# Patient Record
Sex: MALE | Race: WHITE | Employment: OTHER | URBAN - METROPOLITAN AREA
[De-identification: names, ages, dates, MRNs, and addresses within clinical notes are randomized per-mention and may not be internally consistent; named-entity substitution may affect disease eponyms.]

---

## 2019-05-23 ENCOUNTER — HOSPITAL ENCOUNTER (EMERGENCY)
Age: 84
Discharge: HOME | End: 2019-05-23
Payer: MEDICARE

## 2019-05-23 VITALS
TEMPERATURE: 98.4 F | DIASTOLIC BLOOD PRESSURE: 65 MMHG | SYSTOLIC BLOOD PRESSURE: 106 MMHG | WEIGHT: 180 LBS | HEART RATE: 92 BPM | OXYGEN SATURATION: 96 % | RESPIRATION RATE: 18 BRPM

## 2019-05-23 DIAGNOSIS — J01.10 ACUTE NON-RECURRENT FRONTAL SINUSITIS: Primary | ICD-10-CM

## 2019-05-23 DIAGNOSIS — J20.9 ACUTE BRONCHITIS, UNSPECIFIED ORGANISM: ICD-10-CM

## 2019-05-23 PROCEDURE — 99212 OFFICE O/P EST SF 10 MIN: CPT

## 2019-05-23 RX ORDER — DOXYCYCLINE 100 MG/1
100 CAPSULE ORAL 2 TIMES DAILY
Qty: 14 CAPSULE | Refills: 0 | Status: SHIPPED | OUTPATIENT
Start: 2019-05-23 | End: 2019-05-30

## 2019-05-23 RX ORDER — LISINOPRIL 10 MG/1
10 TABLET ORAL DAILY
COMMUNITY
End: 2021-03-23 | Stop reason: SDUPTHER

## 2019-05-23 RX ORDER — FLUTICASONE PROPIONATE 110 UG/1
2 AEROSOL, METERED RESPIRATORY (INHALATION) 2 TIMES DAILY
COMMUNITY

## 2019-05-23 RX ORDER — LEVOTHYROXINE SODIUM 0.03 MG/1
25 TABLET ORAL DAILY
COMMUNITY
End: 2021-01-28 | Stop reason: SDUPTHER

## 2019-05-23 RX ORDER — GUAIFENESIN AND DEXTROMETHORPHAN HYDROBROMIDE 1200; 60 MG/1; MG/1
1 TABLET, EXTENDED RELEASE ORAL EVERY 12 HOURS PRN
Qty: 12 TABLET | Refills: 0 | Status: ON HOLD | OUTPATIENT
Start: 2019-05-23 | End: 2020-06-30

## 2019-05-23 RX ORDER — TRIAMTERENE AND HYDROCHLOROTHIAZIDE 37.5; 25 MG/1; MG/1
TABLET ORAL EVERY OTHER DAY
Status: ON HOLD | COMMUNITY
End: 2020-07-02 | Stop reason: HOSPADM

## 2019-05-23 RX ORDER — FOLIC ACID/MULTIVIT,IRON,MINER .4-18-35
1 TABLET,CHEWABLE ORAL DAILY
COMMUNITY
End: 2022-05-06

## 2019-05-23 RX ORDER — CITALOPRAM 20 MG/1
20 TABLET ORAL DAILY
COMMUNITY
End: 2021-01-28 | Stop reason: SDUPTHER

## 2019-05-23 NOTE — ED PROVIDER NOTES
This is an 77-year-old male presents to urgent care complaining of sinus problems and URI complaints and a cough this past week. He states symptoms have just not really gone away they seem to be lingering on. He denies any abdominal pain nausea vomiting diarrhea or urinary symptoms. Review of Systems   Constitutional:        Pertinent positives and negatives are stated within HPI, all other systems reviewed and are negative. Physical Exam   Constitutional: He is oriented to person, place, and time. He appears well-developed and well-nourished. HENT:   Head: Normocephalic and atraumatic. Right Ear: Hearing, external ear and ear canal normal. No mastoid tenderness. Left Ear: Hearing, external ear and ear canal normal. No mastoid tenderness. Nose: Right sinus exhibits frontal sinus tenderness. Right sinus exhibits no maxillary sinus tenderness. Left sinus exhibits frontal sinus tenderness. Left sinus exhibits no maxillary sinus tenderness. Mouth/Throat: Uvula is midline, oropharynx is clear and moist and mucous membranes are normal. No trismus in the jaw. No uvula swelling. Both TMs are mildly bulging but no perforation or redness. Eyes: Pupils are equal, round, and reactive to light. Conjunctivae, EOM and lids are normal.   Neck: Normal range of motion. Neck supple. Cardiovascular: Normal rate, regular rhythm and normal heart sounds. No murmur heard. Pulmonary/Chest: Effort normal and breath sounds normal.   Occasional moist congested cough   Abdominal: Soft. Bowel sounds are normal. There is no tenderness. There is no rigidity, no rebound, no guarding and no CVA tenderness. Musculoskeletal: He exhibits no edema. Neurological: He is alert and oriented to person, place, and time. He has normal strength. No cranial nerve deficit or sensory deficit. Coordination and gait normal. GCS eye subscore is 4. GCS verbal subscore is 5. GCS motor subscore is 6. Skin: Skin is warm and dry. No abrasion and no rash noted. Nursing note and vitals reviewed. Procedures    [facility]         --------------------------------------------- PAST HISTORY ---------------------------------------------  Past Medical History:  has a past medical history of Arthritis, Asthma, Hyperlipidemia, Hypertension, Macular degeneration, and Thyroid disease. Past Surgical History:  has a past surgical history that includes eye surgery. Social History:  reports that he has never smoked. He has never used smokeless tobacco.    Family History: family history is not on file. The patients home medications have been reviewed. Allergies: Pcn [penicillins]    -------------------------------------------------- RESULTS -------------------------------------------------  No results found for this visit on 05/23/19. No orders to display       ------------------------- NURSING NOTES AND VITALS REVIEWED ---------------------------   The nursing notes within the ED encounter and vital signs as below have been reviewed. /65   Pulse 92   Temp 98.4 °F (36.9 °C) (Oral)   Resp 18   Wt 180 lb (81.6 kg)   SpO2 96%   Oxygen Saturation Interpretation: Normal      ------------------------------------------ PROGRESS NOTES ------------------------------------------   I have spoken with the patient and discussed todays results, in addition to providing specific details for the plan of care and counseling regarding the diagnosis and prognosis. Their questions are answered at this time and they are agreeable with the plan.      --------------------------------- ADDITIONAL PROVIDER NOTES ---------------------------------     This patient is stable for discharge. I have shared the specific conditions for return, as well as the importance of follow-up. * NOTE: This report was transcribed using voice recognition software.  Every effort was made to ensure accuracy; however, inadvertent computerized transcription errors may be present.    --------------------------------- IMPRESSION AND DISPOSITION ---------------------------------    IMPRESSION  1. Acute non-recurrent frontal sinusitis    2.  Acute bronchitis, unspecified organism        DISPOSITION  Disposition: Discharge to home  Patient condition is good         Isela Olguin PA-C  05/23/19 2179

## 2020-02-26 ENCOUNTER — TELEPHONE (OUTPATIENT)
Dept: SURGERY | Age: 85
End: 2020-02-26

## 2020-02-26 ENCOUNTER — OFFICE VISIT (OUTPATIENT)
Dept: SURGERY | Age: 85
End: 2020-02-26
Payer: MEDICARE

## 2020-02-26 VITALS
SYSTOLIC BLOOD PRESSURE: 136 MMHG | TEMPERATURE: 97.6 F | OXYGEN SATURATION: 93 % | DIASTOLIC BLOOD PRESSURE: 93 MMHG | WEIGHT: 180 LBS | HEART RATE: 96 BPM

## 2020-02-26 PROBLEM — K40.90 RIGHT INGUINAL HERNIA: Status: ACTIVE | Noted: 2020-02-26

## 2020-02-26 PROCEDURE — 99204 OFFICE O/P NEW MOD 45 MIN: CPT | Performed by: SURGERY

## 2020-02-26 RX ORDER — ROSUVASTATIN CALCIUM 5 MG/1
5 TABLET, COATED ORAL DAILY
COMMUNITY
End: 2021-01-28

## 2020-02-28 NOTE — TELEPHONE ENCOUNTER
Medical clearance received from Dr. Artem Vera for scheduled right inguinal hernia repair.    Electronically signed by Marko Lutz RN on 2/28/2020 at 11:30 AM

## 2020-03-03 RX ORDER — ASCORBIC ACID 500 MG
500 TABLET ORAL DAILY
COMMUNITY

## 2020-03-04 ENCOUNTER — HOSPITAL ENCOUNTER (OUTPATIENT)
Dept: PREADMISSION TESTING | Age: 85
Discharge: HOME | End: 2020-03-04
Payer: MEDICARE

## 2020-03-04 VITALS
SYSTOLIC BLOOD PRESSURE: 153 MMHG | HEART RATE: 89 BPM | OXYGEN SATURATION: 95 % | DIASTOLIC BLOOD PRESSURE: 86 MMHG | HEIGHT: 69 IN | WEIGHT: 180.56 LBS | RESPIRATION RATE: 18 BRPM | TEMPERATURE: 98 F | BODY MASS INDEX: 26.74 KG/M2

## 2020-03-04 LAB
ANION GAP SERPL CALCULATED.3IONS-SCNC: 9 MMOL/L (ref 7–16)
BUN BLDV-MCNC: 28 MG/DL (ref 8–23)
CALCIUM SERPL-MCNC: 9.7 MG/DL (ref 8.6–10.2)
CHLORIDE BLD-SCNC: 104 MMOL/L (ref 98–107)
CO2: 30 MMOL/L (ref 22–29)
CREAT SERPL-MCNC: 1.2 MG/DL (ref 0.7–1.2)
GFR AFRICAN AMERICAN: >60
GFR NON-AFRICAN AMERICAN: 58 ML/MIN/1.73
GLUCOSE BLD-MCNC: 112 MG/DL (ref 74–99)
HCT VFR BLD CALC: 41.7 % (ref 37–54)
HEMOGLOBIN: 13.5 G/DL (ref 12.5–16.5)
MCH RBC QN AUTO: 32.7 PG (ref 26–35)
MCHC RBC AUTO-ENTMCNC: 32.4 % (ref 32–34.5)
MCV RBC AUTO: 101 FL (ref 80–99.9)
PDW BLD-RTO: 13 FL (ref 11.5–15)
PLATELET # BLD: 206 E9/L (ref 130–450)
PMV BLD AUTO: 10.7 FL (ref 7–12)
POTASSIUM REFLEX MAGNESIUM: 4.4 MMOL/L (ref 3.5–5)
RBC # BLD: 4.13 E12/L (ref 3.8–5.8)
SODIUM BLD-SCNC: 143 MMOL/L (ref 132–146)
WBC # BLD: 7.5 E9/L (ref 4.5–11.5)

## 2020-03-04 PROCEDURE — 36415 COLL VENOUS BLD VENIPUNCTURE: CPT

## 2020-03-04 PROCEDURE — 80048 BASIC METABOLIC PNL TOTAL CA: CPT

## 2020-03-04 PROCEDURE — 85027 COMPLETE CBC AUTOMATED: CPT

## 2020-03-04 NOTE — PROGRESS NOTES
[address]                                                                                                                    PRE OP INSTRUCTIONS FOR  Maria Fernanda Romero        Date: 3/4/2020    Date of surgery: 3/9/2020   Arrival Time: Hospital will call you between 5pm and 7pm with your final arrival time for surgery    1. Do not eat or drink anything after midnight prior to surgery. This includes no water, chewing gum, mints or ice chips. 2. Take the following medications with a small sip of water on the morning of Surgery: levothyroxine, citalopram, use flovent     3. Diabetics may take evening dose of insulin but none after midnight. If you feel symptomatic or low blood sugar morning of surgery drink 1-2 ounces of apple juice only. 4. Aspirin, Ibuprofen, Advil, Naproxen, Vitamin E and other Anti-inflammatory products should be stopped  before surgery  as directed by your physician. Take Tylenol only unless instructed otherwise by your surgeon. 5. Check with your Doctor regarding stopping Plavix, Coumadin, Lovenox, Eliquis, Effient, or other blood thinners. 6. Do not smoke,use illicit drugs and do not drink any alcoholic beverages 24 hours prior to surgery. 7. You may brush your teeth the morning of surgery. DO NOT SWALLOW WATER    8. You MUST make arrangements for a responsible adult to take you home after your surgery. You will not be allowed to leave alone or drive yourself home. It is strongly suggested someone stay with you the first 24 hrs. Your surgery will be cancelled if you do not have a ride home. 9. PEDIATRIC PATIENTS ONLY:  A parent/legal guardian must accompany a child scheduled for surgery and plan to stay at the hospital until the child is discharged. Please do not bring other children with you.     10. Please wear simple, loose fitting clothing to the hospital.  Do not bring valuables (money, credit cards, checkbooks, etc.) Do not wear any makeup

## 2020-03-09 ENCOUNTER — HOSPITAL ENCOUNTER (OUTPATIENT)
Age: 85
Setting detail: OUTPATIENT SURGERY
Discharge: HOME | End: 2020-03-09
Attending: SURGERY | Admitting: SURGERY
Payer: MEDICARE

## 2020-03-09 ENCOUNTER — ANESTHESIA (OUTPATIENT)
Dept: OPERATING ROOM | Age: 85
End: 2020-03-09
Payer: MEDICARE

## 2020-03-09 ENCOUNTER — ANESTHESIA EVENT (OUTPATIENT)
Dept: OPERATING ROOM | Age: 85
End: 2020-03-09
Payer: MEDICARE

## 2020-03-09 VITALS
OXYGEN SATURATION: 94 % | HEART RATE: 91 BPM | BODY MASS INDEX: 26.22 KG/M2 | RESPIRATION RATE: 16 BRPM | SYSTOLIC BLOOD PRESSURE: 131 MMHG | TEMPERATURE: 98.6 F | DIASTOLIC BLOOD PRESSURE: 70 MMHG | HEIGHT: 69 IN | WEIGHT: 177 LBS

## 2020-03-09 VITALS
OXYGEN SATURATION: 100 % | SYSTOLIC BLOOD PRESSURE: 124 MMHG | DIASTOLIC BLOOD PRESSURE: 77 MMHG | RESPIRATION RATE: 1 BRPM

## 2020-03-09 PROCEDURE — 3700000001 HC ADD 15 MINUTES (ANESTHESIA): Performed by: SURGERY

## 2020-03-09 PROCEDURE — 2500000003 HC RX 250 WO HCPCS: Performed by: NURSE ANESTHETIST, CERTIFIED REGISTERED

## 2020-03-09 PROCEDURE — 2500000003 HC RX 250 WO HCPCS: Performed by: SURGERY

## 2020-03-09 PROCEDURE — 7100000010 HC PHASE II RECOVERY - FIRST 15 MIN: Performed by: SURGERY

## 2020-03-09 PROCEDURE — 6360000002 HC RX W HCPCS: Performed by: ANESTHESIOLOGY

## 2020-03-09 PROCEDURE — 7100000001 HC PACU RECOVERY - ADDTL 15 MIN: Performed by: SURGERY

## 2020-03-09 PROCEDURE — S2900 ROBOTIC SURGICAL SYSTEM: HCPCS | Performed by: SURGERY

## 2020-03-09 PROCEDURE — 2709999900 HC NON-CHARGEABLE SUPPLY: Performed by: SURGERY

## 2020-03-09 PROCEDURE — 49650 LAP ING HERNIA REPAIR INIT: CPT | Performed by: SURGERY

## 2020-03-09 PROCEDURE — 7100000011 HC PHASE II RECOVERY - ADDTL 15 MIN: Performed by: SURGERY

## 2020-03-09 PROCEDURE — 3600000009 HC SURGERY ROBOT BASE: Performed by: SURGERY

## 2020-03-09 PROCEDURE — 2580000003 HC RX 258: Performed by: SURGERY

## 2020-03-09 PROCEDURE — 3600000019 HC SURGERY ROBOT ADDTL 15MIN: Performed by: SURGERY

## 2020-03-09 PROCEDURE — 6370000000 HC RX 637 (ALT 250 FOR IP): Performed by: SURGERY

## 2020-03-09 PROCEDURE — 2780000010 HC IMPLANT OTHER: Performed by: SURGERY

## 2020-03-09 PROCEDURE — 6360000002 HC RX W HCPCS: Performed by: NURSE ANESTHETIST, CERTIFIED REGISTERED

## 2020-03-09 PROCEDURE — 3700000000 HC ANESTHESIA ATTENDED CARE: Performed by: SURGERY

## 2020-03-09 PROCEDURE — 6370000000 HC RX 637 (ALT 250 FOR IP): Performed by: ANESTHESIOLOGY

## 2020-03-09 PROCEDURE — C1781 MESH (IMPLANTABLE): HCPCS | Performed by: SURGERY

## 2020-03-09 PROCEDURE — 7100000000 HC PACU RECOVERY - FIRST 15 MIN: Performed by: SURGERY

## 2020-03-09 DEVICE — MESH HERN W16XL12CM LAP MFIL POLY TEREPHTHALATE MFIL: Type: IMPLANTABLE DEVICE | Site: INGUINAL | Status: FUNCTIONAL

## 2020-03-09 RX ORDER — ROCURONIUM BROMIDE 10 MG/ML
INJECTION, SOLUTION INTRAVENOUS PRN
Status: DISCONTINUED | OUTPATIENT
Start: 2020-03-09 | End: 2020-03-09 | Stop reason: SDUPTHER

## 2020-03-09 RX ORDER — EPHEDRINE SULFATE/0.9% NACL/PF 50 MG/5 ML
SYRINGE (ML) INTRAVENOUS PRN
Status: DISCONTINUED | OUTPATIENT
Start: 2020-03-09 | End: 2020-03-09 | Stop reason: SDUPTHER

## 2020-03-09 RX ORDER — PROPOFOL 10 MG/ML
INJECTION, EMULSION INTRAVENOUS PRN
Status: DISCONTINUED | OUTPATIENT
Start: 2020-03-09 | End: 2020-03-09 | Stop reason: SDUPTHER

## 2020-03-09 RX ORDER — FENTANYL CITRATE 50 UG/ML
INJECTION, SOLUTION INTRAMUSCULAR; INTRAVENOUS PRN
Status: DISCONTINUED | OUTPATIENT
Start: 2020-03-09 | End: 2020-03-09 | Stop reason: SDUPTHER

## 2020-03-09 RX ORDER — ONDANSETRON 2 MG/ML
INJECTION INTRAMUSCULAR; INTRAVENOUS PRN
Status: DISCONTINUED | OUTPATIENT
Start: 2020-03-09 | End: 2020-03-09 | Stop reason: SDUPTHER

## 2020-03-09 RX ORDER — BUPIVACAINE HYDROCHLORIDE AND EPINEPHRINE 2.5; 5 MG/ML; UG/ML
INJECTION, SOLUTION EPIDURAL; INFILTRATION; INTRACAUDAL; PERINEURAL PRN
Status: DISCONTINUED | OUTPATIENT
Start: 2020-03-09 | End: 2020-03-09 | Stop reason: ALTCHOICE

## 2020-03-09 RX ORDER — TAMSULOSIN HYDROCHLORIDE 0.4 MG/1
0.4 CAPSULE ORAL DAILY
Qty: 5 CAPSULE | Refills: 0 | Status: SHIPPED | OUTPATIENT
Start: 2020-03-09 | End: 2022-07-06

## 2020-03-09 RX ORDER — GLYCOPYRROLATE 1 MG/5 ML
SYRINGE (ML) INTRAVENOUS PRN
Status: DISCONTINUED | OUTPATIENT
Start: 2020-03-09 | End: 2020-03-09 | Stop reason: SDUPTHER

## 2020-03-09 RX ORDER — MEPERIDINE HYDROCHLORIDE 25 MG/ML
12.5 INJECTION INTRAMUSCULAR; INTRAVENOUS; SUBCUTANEOUS EVERY 5 MIN PRN
Status: DISCONTINUED | OUTPATIENT
Start: 2020-03-09 | End: 2020-03-09 | Stop reason: HOSPADM

## 2020-03-09 RX ORDER — SODIUM CHLORIDE 0.9 % (FLUSH) 0.9 %
10 SYRINGE (ML) INJECTION PRN
Status: DISCONTINUED | OUTPATIENT
Start: 2020-03-09 | End: 2020-03-09 | Stop reason: HOSPADM

## 2020-03-09 RX ORDER — DOCUSATE SODIUM 100 MG/1
100 CAPSULE, LIQUID FILLED ORAL DAILY PRN
Qty: 30 CAPSULE | Refills: 0 | Status: SHIPPED | OUTPATIENT
Start: 2020-03-09 | End: 2021-01-28

## 2020-03-09 RX ORDER — TAMSULOSIN HYDROCHLORIDE 0.4 MG/1
0.4 CAPSULE ORAL ONCE
Status: COMPLETED | OUTPATIENT
Start: 2020-03-09 | End: 2020-03-09

## 2020-03-09 RX ORDER — CLINDAMYCIN PHOSPHATE 900 MG/50ML
900 INJECTION INTRAVENOUS
Status: COMPLETED | OUTPATIENT
Start: 2020-03-09 | End: 2020-03-09

## 2020-03-09 RX ORDER — SODIUM CHLORIDE 9 MG/ML
INJECTION, SOLUTION INTRAVENOUS CONTINUOUS
Status: DISCONTINUED | OUTPATIENT
Start: 2020-03-09 | End: 2020-03-09 | Stop reason: HOSPADM

## 2020-03-09 RX ORDER — NEOSTIGMINE METHYLSULFATE 1 MG/ML
INJECTION, SOLUTION INTRAVENOUS PRN
Status: DISCONTINUED | OUTPATIENT
Start: 2020-03-09 | End: 2020-03-09 | Stop reason: SDUPTHER

## 2020-03-09 RX ORDER — MEPERIDINE HYDROCHLORIDE 25 MG/ML
INJECTION INTRAMUSCULAR; INTRAVENOUS; SUBCUTANEOUS
Status: DISCONTINUED
Start: 2020-03-09 | End: 2020-03-09 | Stop reason: HOSPADM

## 2020-03-09 RX ORDER — TRAMADOL HYDROCHLORIDE 50 MG/1
50 TABLET ORAL ONCE
Status: COMPLETED | OUTPATIENT
Start: 2020-03-09 | End: 2020-03-09

## 2020-03-09 RX ORDER — LIDOCAINE HYDROCHLORIDE 20 MG/ML
INJECTION, SOLUTION INTRAVENOUS PRN
Status: DISCONTINUED | OUTPATIENT
Start: 2020-03-09 | End: 2020-03-09 | Stop reason: SDUPTHER

## 2020-03-09 RX ORDER — HYDROMORPHONE HYDROCHLORIDE 1 MG/ML
0.25 INJECTION, SOLUTION INTRAMUSCULAR; INTRAVENOUS; SUBCUTANEOUS EVERY 5 MIN PRN
Status: DISCONTINUED | OUTPATIENT
Start: 2020-03-09 | End: 2020-03-09 | Stop reason: HOSPADM

## 2020-03-09 RX ORDER — ONDANSETRON 2 MG/ML
4 INJECTION INTRAMUSCULAR; INTRAVENOUS
Status: DISCONTINUED | OUTPATIENT
Start: 2020-03-09 | End: 2020-03-09 | Stop reason: HOSPADM

## 2020-03-09 RX ORDER — HYDROMORPHONE HYDROCHLORIDE 1 MG/ML
0.5 INJECTION, SOLUTION INTRAMUSCULAR; INTRAVENOUS; SUBCUTANEOUS EVERY 5 MIN PRN
Status: DISCONTINUED | OUTPATIENT
Start: 2020-03-09 | End: 2020-03-09 | Stop reason: HOSPADM

## 2020-03-09 RX ORDER — TRAMADOL HYDROCHLORIDE 50 MG/1
50 TABLET ORAL EVERY 4 HOURS PRN
Qty: 18 TABLET | Refills: 0 | Status: SHIPPED | OUTPATIENT
Start: 2020-03-09 | End: 2020-03-12

## 2020-03-09 RX ORDER — SODIUM CHLORIDE 0.9 % (FLUSH) 0.9 %
10 SYRINGE (ML) INJECTION EVERY 12 HOURS SCHEDULED
Status: DISCONTINUED | OUTPATIENT
Start: 2020-03-09 | End: 2020-03-09 | Stop reason: HOSPADM

## 2020-03-09 RX ADMIN — FENTANYL CITRATE 100 MCG: 50 INJECTION, SOLUTION INTRAMUSCULAR; INTRAVENOUS at 12:42

## 2020-03-09 RX ADMIN — MEPERIDINE HYDROCHLORIDE 12.5 MG: 25 INJECTION INTRAMUSCULAR; INTRAVENOUS; SUBCUTANEOUS at 14:20

## 2020-03-09 RX ADMIN — MEPERIDINE HYDROCHLORIDE 12.5 MG: 25 INJECTION INTRAMUSCULAR; INTRAVENOUS; SUBCUTANEOUS at 14:48

## 2020-03-09 RX ADMIN — HYDROMORPHONE HYDROCHLORIDE 0.5 MG: 1 INJECTION, SOLUTION INTRAMUSCULAR; INTRAVENOUS; SUBCUTANEOUS at 13:51

## 2020-03-09 RX ADMIN — CLINDAMYCIN PHOSPHATE 900 MG: 900 INJECTION, SOLUTION INTRAVENOUS at 12:38

## 2020-03-09 RX ADMIN — SODIUM CHLORIDE: 9 INJECTION, SOLUTION INTRAVENOUS at 12:38

## 2020-03-09 RX ADMIN — LIDOCAINE HYDROCHLORIDE 50 MG: 20 INJECTION, SOLUTION INTRAVENOUS at 12:42

## 2020-03-09 RX ADMIN — SODIUM CHLORIDE: 9 INJECTION, SOLUTION INTRAVENOUS at 11:46

## 2020-03-09 RX ADMIN — HYDROMORPHONE HYDROCHLORIDE 0.5 MG: 1 INJECTION, SOLUTION INTRAMUSCULAR; INTRAVENOUS; SUBCUTANEOUS at 14:00

## 2020-03-09 RX ADMIN — SODIUM CHLORIDE: 9 INJECTION, SOLUTION INTRAVENOUS at 13:19

## 2020-03-09 RX ADMIN — Medication 10 MG: at 12:55

## 2020-03-09 RX ADMIN — Medication 3 MG: at 13:14

## 2020-03-09 RX ADMIN — TRAMADOL HYDROCHLORIDE 50 MG: 50 TABLET, FILM COATED ORAL at 16:11

## 2020-03-09 RX ADMIN — ROCURONIUM BROMIDE 35 MG: 10 SOLUTION INTRAVENOUS at 12:42

## 2020-03-09 RX ADMIN — Medication 0.6 MG: at 13:14

## 2020-03-09 RX ADMIN — PROPOFOL 200 MG: 10 INJECTION, EMULSION INTRAVENOUS at 12:42

## 2020-03-09 RX ADMIN — MEPERIDINE HYDROCHLORIDE 12.5 MG: 25 INJECTION INTRAMUSCULAR; INTRAVENOUS; SUBCUTANEOUS at 14:14

## 2020-03-09 RX ADMIN — ONDANSETRON HYDROCHLORIDE 4 MG: 2 INJECTION, SOLUTION INTRAMUSCULAR; INTRAVENOUS at 13:11

## 2020-03-09 RX ADMIN — TAMSULOSIN HYDROCHLORIDE 0.4 MG: 0.4 CAPSULE ORAL at 16:11

## 2020-03-09 ASSESSMENT — PAIN DESCRIPTION - PAIN TYPE
TYPE: SURGICAL PAIN
TYPE: ACUTE PAIN;SURGICAL PAIN

## 2020-03-09 ASSESSMENT — PAIN DESCRIPTION - FREQUENCY
FREQUENCY: CONTINUOUS
FREQUENCY: INTERMITTENT
FREQUENCY: CONTINUOUS
FREQUENCY: CONTINUOUS

## 2020-03-09 ASSESSMENT — PAIN SCALES - GENERAL
PAINLEVEL_OUTOF10: 5
PAINLEVEL_OUTOF10: 8
PAINLEVEL_OUTOF10: 2
PAINLEVEL_OUTOF10: 6
PAINLEVEL_OUTOF10: 8
PAINLEVEL_OUTOF10: 6

## 2020-03-09 ASSESSMENT — PULMONARY FUNCTION TESTS
PIF_VALUE: 30
PIF_VALUE: 4
PIF_VALUE: 15
PIF_VALUE: 18
PIF_VALUE: 30
PIF_VALUE: 4
PIF_VALUE: 4
PIF_VALUE: 18
PIF_VALUE: 4
PIF_VALUE: 30
PIF_VALUE: 18
PIF_VALUE: 0
PIF_VALUE: 0
PIF_VALUE: 31
PIF_VALUE: 30
PIF_VALUE: 15
PIF_VALUE: 0
PIF_VALUE: 18
PIF_VALUE: 4
PIF_VALUE: 31
PIF_VALUE: 17
PIF_VALUE: 3
PIF_VALUE: 30
PIF_VALUE: 17
PIF_VALUE: 30
PIF_VALUE: 0
PIF_VALUE: 30
PIF_VALUE: 30
PIF_VALUE: 18
PIF_VALUE: 0
PIF_VALUE: 15
PIF_VALUE: 21
PIF_VALUE: 30
PIF_VALUE: 18
PIF_VALUE: 0
PIF_VALUE: 25
PIF_VALUE: 3
PIF_VALUE: 30
PIF_VALUE: 30
PIF_VALUE: 18
PIF_VALUE: 30
PIF_VALUE: 27
PIF_VALUE: 3
PIF_VALUE: 30
PIF_VALUE: 17
PIF_VALUE: 30

## 2020-03-09 ASSESSMENT — PAIN DESCRIPTION - PROGRESSION
CLINICAL_PROGRESSION: NOT CHANGED
CLINICAL_PROGRESSION: GRADUALLY WORSENING
CLINICAL_PROGRESSION: GRADUALLY IMPROVING
CLINICAL_PROGRESSION: GRADUALLY IMPROVING
CLINICAL_PROGRESSION: NOT CHANGED

## 2020-03-09 ASSESSMENT — PAIN DESCRIPTION - ORIENTATION
ORIENTATION: RIGHT
ORIENTATION: LEFT;ANTERIOR;MID
ORIENTATION: LEFT;ANTERIOR;MID;UPPER
ORIENTATION: LEFT;ANTERIOR;MID

## 2020-03-09 ASSESSMENT — PAIN - FUNCTIONAL ASSESSMENT
PAIN_FUNCTIONAL_ASSESSMENT: PREVENTS OR INTERFERES SOME ACTIVE ACTIVITIES AND ADLS
PAIN_FUNCTIONAL_ASSESSMENT: ACTIVITIES ARE NOT PREVENTED
PAIN_FUNCTIONAL_ASSESSMENT: 0-10

## 2020-03-09 ASSESSMENT — PAIN DESCRIPTION - DESCRIPTORS
DESCRIPTORS: CONSTANT;DISCOMFORT;SORE
DESCRIPTORS: CONSTANT;DISCOMFORT;DULL
DESCRIPTORS: CONSTANT;DISCOMFORT;SORE
DESCRIPTORS: SORE

## 2020-03-09 ASSESSMENT — PAIN DESCRIPTION - LOCATION
LOCATION: ABDOMEN

## 2020-03-09 ASSESSMENT — PAIN DESCRIPTION - ONSET
ONSET: SUDDEN
ONSET: ON-GOING
ONSET: GRADUAL
ONSET: ON-GOING

## 2020-03-09 NOTE — ANESTHESIA POSTPROCEDURE EVALUATION
Department of Anesthesiology  Postprocedure Note    Patient: Adarsh Harmon  MRN: [id number]  YOB: 1935  Date of evaluation: 3/9/2020  Time:  4:57 PM     Procedure Summary     Date:  03/09/20 Room / Location:  [address] [place] [address] / [address] [place] [address]    Anesthesia Start:  4097 Anesthesia Stop:  1329    Procedure:  LAPAROSCOPIC HERNIA RIGHT  INGUINAL REPAIR WITH MESH ROBOTIC XI (Right ) Diagnosis:  (INGUINAL HERNIA)    Surgeon:  Ha Richardson MD Responsible Provider:  Yuni Calderon MD    Anesthesia Type:  general ASA Status:  2          Anesthesia Type: general    Hossein Phase I: Hossein Score: 9    Hossein Phase II: Hossein Score: 10    Last vitals: Reviewed and per EMR flowsheets.        Anesthesia Post Evaluation    Patient location during evaluation: PACU  Patient participation: complete - patient participated  Level of consciousness: awake and alert  Airway patency: patent  Nausea & Vomiting: no vomiting and no nausea  Complications: no  Cardiovascular status: hemodynamically stable  Respiratory status: acceptable  Hydration status: stable

## 2020-03-09 NOTE — OP NOTE
flaps. We started on the right side and dissected out the cord structures and completely dissected out the hernia sac reducing it from the inguinal canal. There was a large right hernia sac with small cord lipoma, which was completely reduced from the inguinal canal. There was omentum incarcerated in it as well which was reduced. We completely exposed the pubic tubercle and Scott's ligament. We completely reduced the hernia sac off of the cord structures. We surrounded the cord structures and skeletonized them. 3grams of Arash descicant was placed in the inguinal canal to minimize seroma formation. We then inserted a 16 cm x 12 cm piece of ProGrip mesh, placed in the inguinal canal and unrolled it. It self adhered to the inguinal canal overlapping Scott's ligament with at least 2cm overlap. It was completely unfolded and it secured itself in place. We then closed the preperitoneal space with running 6 inch V-Loc suture. There was no hernia on the left side that was appreciated laparoscopically nor did the patient complain of it. We then removed both of the needles from the V-Loc sutures, decompressed the abdomen and removed each one of our trocars. We performed inguinal block with 0.25% Marcaine 10 mL. We then reapproximated each one of the skin incisions using 4-0 Monocryl suture. SurgiGlue was placed over the top. The patient was awoken, extubated and transferred to the postoperative care unit in stable condition. All instrument counts, lap counts, and needle counts were correct at the completion of the procedure.     Ana Islas MD, MS  Minimally Invasive and Bariatric Surgery  [phone] (p)  3/9/2020  1:16 PM

## 2020-03-09 NOTE — H&P
General Surgery History and Physical  [facility]    Patient's Name/Date of Birth: Adarsh Harmon / 1935    Date: March 9, 2020     Surgeon: Ha Richardson M.D.    PCP: Taz Castro MD     Chief Complaint: right Inguinal bulge    HPI:   Adarsh Harmon is a 80 y.o. male who presents for evaluation of right inguinal hernia. Timing is constant, radiation to right groin, alleviated by none and started many years ago but worse last 3 months and severity is 7/10. States pain has been worsening, no symptoms of bowel obstruction, nausea, vomiting, fever, chills, abdominal pain. States it protrudes with activity, it is reducible. Thinks he had repair done in his 25s but cannot recall. Patient Active Problem List   Diagnosis    Right inguinal hernia       Past Medical History:   Diagnosis Date    Arthritis     Asthma     Hyperlipidemia     Hypertension     Macular degeneration     PONV (postoperative nausea and vomiting)     Thyroid disease        Past Surgical History:   Procedure Laterality Date    BACK SURGERY      1989    COLONOSCOPY      ENDOSCOPY, COLON, DIAGNOSTIC      EYE SURGERY      viviane cataracts    HERNIA REPAIR      x2  1955, 1973    SINUS SURGERY         Allergies   Allergen Reactions    Pcn [Penicillins] Swelling    Prednisone Swelling     Swelling of ankles       The patient has a family history that is negative for severe cardiovascular or respiratory issues, negative for reaction to anesthesia. Time spent reviewing past medical, surgical, social and family history, vitals, nursing assessment and images. No changes from above documented history.     Social History     Socioeconomic History    Marital status:      Spouse name: Not on file    Number of children: Not on file    Years of education: Not on file    Highest education level: Not on file   Occupational History    Not on file   Social Needs    Financial resource strain: Not on file   Emerson-Chele insecurity     Worry: Not on file     Inability: Not on file    Transportation needs     Medical: Not on file     Non-medical: Not on file   Tobacco Use    Smoking status: Never Smoker    Smokeless tobacco: Never Used   Substance and Sexual Activity    Alcohol use: Yes     Comment: on occasion    Drug use: Never    Sexual activity: Not on file   Lifestyle    Physical activity     Days per week: Not on file     Minutes per session: Not on file    Stress: Not on file   Relationships    Social connections     Talks on phone: Not on file     Gets together: Not on file     Attends Druze service: Not on file     Active member of club or organization: Not on file     Attends meetings of clubs or organizations: Not on file     Relationship status: Not on file    Intimate partner violence     Fear of current or ex partner: Not on file     Emotionally abused: Not on file     Physically abused: Not on file     Forced sexual activity: Not on file   Other Topics Concern    Not on file   Social History Narrative    Not on file         A complete 10 system review was performed and are otherwise negative unless mentioned in the above HPI. Specific negatives are listed below but may not include all those reviewed.     General ROS: negative obtundation, AMS  ENT ROS: negative rhinorrhea, epistaxis  Allergy and Immunology ROS: negative itchy/watery eyes or nasal congestion  Hematological and Lymphatic ROS: negative spontaneous bleeding or bruising  Endocrine ROS: negative  lethargy, mood swings, palpitations or polydipsia/polyuria  Respiratory ROS: negative sputum changes, stridor, tachypnea or wheezing  Cardiovascular ROS: negative for - loss of consciousness, murmur or orthopnea  Gastrointestinal ROS: negative for - hematochezia or hematemesis  Genito-Urinary ROS: negative for -  genital discharge or hematuria  Musculoskeletal ROS: negative for - focal weakness, gangrene  Psych/Neuro ROS: negative for - visual or

## 2020-03-09 NOTE — ANESTHESIA PRE PROCEDURE
Department of Anesthesiology  Preprocedure Note       Name:  Cale Welsh   Age:  80 y.o.  :  1935                                          MRN:  [id number]         Date:  3/9/2020      Surgeon: Urbano Mena):  Elsie Franklin MD    Procedure: LAPAROSCOPIC HERNIA RIGHT  INGUINAL REPAIR WITH MESH POSS BILATERAL  ROBOTIC XI (Right )    Medications prior to admission:   Prior to Admission medications    Medication Sig Start Date End Date Taking?  Authorizing Provider   fluticasone (FLOVENT HFA) 110 MCG/ACT inhaler Inhale 1 puff into the lungs 2 times daily   Yes Historical Provider, MD   levothyroxine (SYNTHROID) 25 MCG tablet Take 25 mcg by mouth Daily   Yes Historical Provider, MD   lisinopril (PRINIVIL;ZESTRIL) 5 MG tablet Take 5 mg by mouth daily   Yes Historical Provider, MD   citalopram (CELEXA) 40 MG tablet Take 20 mg by mouth daily    Yes Historical Provider, MD   vitamin C (ASCORBIC ACID) 500 MG tablet Take 500 mg by mouth daily    Historical Provider, MD   Multiple Vitamins-Minerals (PRESERVISION AREDS 2+MULTI VIT PO) Take by mouth    Historical Provider, MD   rosuvastatin (CRESTOR) 5 MG tablet Take 5 mg by mouth daily Indications: 1 tab QOD     Historical Provider, MD   triamterene-hydrochlorothiazide (MAXZIDE-25) 37.5-25 MG per tablet Take by mouth every other day Indications: 1/2 tab qod     Historical Provider, MD   multivitamin-iron-minerals-folic acid (CENTRUM) chewable tablet Take 1 tablet by mouth daily    Historical Provider, MD   Dextromethorphan-guaiFENesin (MUCINEX DM MAXIMUM STRENGTH)  MG TB12 Take 1 tablet by mouth every 12 hours as needed (cough)  Patient not taking: Reported on 2020   Mellisa Cho PA-C       Current medications:    Current Facility-Administered Medications   Medication Dose Route Frequency Provider Last Rate Last Dose    0.9 % sodium chloride infusion   Intravenous Continuous Elsie Franklin MD        sodium chloride flush 0.9 % injection 10 mL administered. Anesthetic plan and risks discussed with patient. Plan discussed with CRNA.                   Lynn Forrester MD   3/9/2020

## 2020-04-06 ENCOUNTER — VIRTUAL VISIT (OUTPATIENT)
Dept: SURGERY | Age: 85
End: 2020-04-06

## 2020-05-21 ENCOUNTER — HOSPITAL ENCOUNTER (OUTPATIENT)
Age: 85
Discharge: HOME | End: 2020-05-21
Payer: MEDICARE

## 2020-05-21 LAB
ALBUMIN SERPL-MCNC: 4.3 G/DL (ref 3.5–5.2)
ALP BLD-CCNC: 73 U/L (ref 40–129)
ALT SERPL-CCNC: 16 U/L (ref 0–40)
ANION GAP SERPL CALCULATED.3IONS-SCNC: 12 MMOL/L (ref 7–16)
AST SERPL-CCNC: 22 U/L (ref 0–39)
BASOPHILS ABSOLUTE: 0.03 E9/L (ref 0–0.2)
BASOPHILS RELATIVE PERCENT: 0.5 % (ref 0–2)
BILIRUB SERPL-MCNC: 0.5 MG/DL (ref 0–1.2)
BUN BLDV-MCNC: 23 MG/DL (ref 8–23)
CALCIUM SERPL-MCNC: 9.4 MG/DL (ref 8.6–10.2)
CHLORIDE BLD-SCNC: 103 MMOL/L (ref 98–107)
CHOLESTEROL, FASTING: 220 MG/DL (ref 0–199)
CO2: 26 MMOL/L (ref 22–29)
CREAT SERPL-MCNC: 1.1 MG/DL (ref 0.7–1.2)
EOSINOPHILS ABSOLUTE: 0.17 E9/L (ref 0.05–0.5)
EOSINOPHILS RELATIVE PERCENT: 2.8 % (ref 0–6)
GFR AFRICAN AMERICAN: >60
GFR NON-AFRICAN AMERICAN: >60 ML/MIN/1.73
GLUCOSE FASTING: 106 MG/DL (ref 74–99)
HBA1C MFR BLD: 6.1 % (ref 4–5.6)
HCT VFR BLD CALC: 41.1 % (ref 37–54)
HDLC SERPL-MCNC: 51 MG/DL
HEMOGLOBIN: 13.4 G/DL (ref 12.5–16.5)
IMMATURE GRANULOCYTES #: 0.02 E9/L
IMMATURE GRANULOCYTES %: 0.3 % (ref 0–5)
LDL CHOLESTEROL CALCULATED: 147 MG/DL (ref 0–99)
LYMPHOCYTES ABSOLUTE: 1.57 E9/L (ref 1.5–4)
LYMPHOCYTES RELATIVE PERCENT: 26.1 % (ref 20–42)
MCH RBC QN AUTO: 32.3 PG (ref 26–35)
MCHC RBC AUTO-ENTMCNC: 32.6 % (ref 32–34.5)
MCV RBC AUTO: 99 FL (ref 80–99.9)
MONOCYTES ABSOLUTE: 0.68 E9/L (ref 0.1–0.95)
MONOCYTES RELATIVE PERCENT: 11.3 % (ref 2–12)
NEUTROPHILS ABSOLUTE: 3.54 E9/L (ref 1.8–7.3)
NEUTROPHILS RELATIVE PERCENT: 59 % (ref 43–80)
PDW BLD-RTO: 13.5 FL (ref 11.5–15)
PLATELET # BLD: 199 E9/L (ref 130–450)
PMV BLD AUTO: 11 FL (ref 7–12)
POTASSIUM SERPL-SCNC: 4.4 MMOL/L (ref 3.5–5)
RBC # BLD: 4.15 E12/L (ref 3.8–5.8)
SODIUM BLD-SCNC: 141 MMOL/L (ref 132–146)
T4 FREE: 1.32 NG/DL (ref 0.93–1.7)
TOTAL PROTEIN: 7 G/DL (ref 6.4–8.3)
TRIGLYCERIDE, FASTING: 108 MG/DL (ref 0–149)
TSH SERPL DL<=0.05 MIU/L-ACNC: 5.71 UIU/ML (ref 0.27–4.2)
VLDLC SERPL CALC-MCNC: 22 MG/DL
WBC # BLD: 6 E9/L (ref 4.5–11.5)

## 2020-05-21 PROCEDURE — 36415 COLL VENOUS BLD VENIPUNCTURE: CPT

## 2020-05-21 PROCEDURE — 80053 COMPREHEN METABOLIC PANEL: CPT

## 2020-05-21 PROCEDURE — 83036 HEMOGLOBIN GLYCOSYLATED A1C: CPT

## 2020-05-21 PROCEDURE — 85025 COMPLETE CBC W/AUTO DIFF WBC: CPT

## 2020-05-21 PROCEDURE — 80061 LIPID PANEL: CPT

## 2020-05-21 PROCEDURE — 84443 ASSAY THYROID STIM HORMONE: CPT

## 2020-05-21 PROCEDURE — 84439 ASSAY OF FREE THYROXINE: CPT

## 2020-06-30 ENCOUNTER — APPOINTMENT (OUTPATIENT)
Dept: CT IMAGING | Age: 85
End: 2020-06-30
Payer: MEDICARE

## 2020-06-30 ENCOUNTER — HOSPITAL ENCOUNTER (OUTPATIENT)
Age: 85
Setting detail: OBSERVATION
Discharge: HOME | End: 2020-07-02
Attending: EMERGENCY MEDICINE | Admitting: INTERNAL MEDICINE
Payer: MEDICARE

## 2020-06-30 ENCOUNTER — APPOINTMENT (OUTPATIENT)
Dept: ULTRASOUND IMAGING | Age: 85
End: 2020-06-30
Payer: MEDICARE

## 2020-06-30 PROBLEM — R42 DIZZINESS: Status: ACTIVE | Noted: 2020-06-30

## 2020-06-30 LAB
ALBUMIN SERPL-MCNC: 4 G/DL (ref 3.5–5.2)
ALP BLD-CCNC: 65 U/L (ref 40–129)
ALT SERPL-CCNC: 22 U/L (ref 0–40)
ANION GAP SERPL CALCULATED.3IONS-SCNC: 12 MMOL/L (ref 7–16)
AST SERPL-CCNC: 25 U/L (ref 0–39)
BASOPHILS ABSOLUTE: 0.02 E9/L (ref 0–0.2)
BASOPHILS RELATIVE PERCENT: 0.3 % (ref 0–2)
BILIRUB SERPL-MCNC: 0.5 MG/DL (ref 0–1.2)
BILIRUBIN URINE: NEGATIVE
BLOOD, URINE: NEGATIVE
BUN BLDV-MCNC: 26 MG/DL (ref 8–23)
CALCIUM SERPL-MCNC: 9.6 MG/DL (ref 8.6–10.2)
CHLORIDE BLD-SCNC: 100 MMOL/L (ref 98–107)
CK MB: 4.8 NG/ML (ref 0–7.7)
CLARITY: CLEAR
CO2: 28 MMOL/L (ref 22–29)
COLOR: YELLOW
CREAT SERPL-MCNC: 1.1 MG/DL (ref 0.7–1.2)
EOSINOPHILS ABSOLUTE: 0.04 E9/L (ref 0.05–0.5)
EOSINOPHILS RELATIVE PERCENT: 0.6 % (ref 0–6)
GFR AFRICAN AMERICAN: >60
GFR NON-AFRICAN AMERICAN: >60 ML/MIN/1.73
GLUCOSE BLD-MCNC: 123 MG/DL (ref 74–99)
GLUCOSE URINE: NEGATIVE MG/DL
HCT VFR BLD CALC: 43.4 % (ref 37–54)
HEMOGLOBIN: 14 G/DL (ref 12.5–16.5)
IMMATURE GRANULOCYTES #: 0.05 E9/L
IMMATURE GRANULOCYTES %: 0.7 % (ref 0–5)
KETONES, URINE: NEGATIVE MG/DL
LEUKOCYTE ESTERASE, URINE: NEGATIVE
LYMPHOCYTES ABSOLUTE: 1.31 E9/L (ref 1.5–4)
LYMPHOCYTES RELATIVE PERCENT: 19.4 % (ref 20–42)
MCH RBC QN AUTO: 32.9 PG (ref 26–35)
MCHC RBC AUTO-ENTMCNC: 32.3 % (ref 32–34.5)
MCV RBC AUTO: 101.9 FL (ref 80–99.9)
MONOCYTES ABSOLUTE: 0.55 E9/L (ref 0.1–0.95)
MONOCYTES RELATIVE PERCENT: 8.1 % (ref 2–12)
NEUTROPHILS ABSOLUTE: 4.8 E9/L (ref 1.8–7.3)
NEUTROPHILS RELATIVE PERCENT: 70.9 % (ref 43–80)
NITRITE, URINE: NEGATIVE
PDW BLD-RTO: 13.8 FL (ref 11.5–15)
PH UA: 6 (ref 5–9)
PLATELET # BLD: 207 E9/L (ref 130–450)
PMV BLD AUTO: 9.8 FL (ref 7–12)
POTASSIUM SERPL-SCNC: 4.4 MMOL/L (ref 3.5–5)
PRO-BNP: 273 PG/ML (ref 0–450)
PROTEIN UA: NEGATIVE MG/DL
RBC # BLD: 4.26 E12/L (ref 3.8–5.8)
SODIUM BLD-SCNC: 140 MMOL/L (ref 132–146)
SPECIFIC GRAVITY UA: 1.02 (ref 1–1.03)
T4 FREE: 1.28 NG/DL (ref 0.93–1.7)
TOTAL CK: 65 U/L (ref 20–200)
TOTAL PROTEIN: 6.8 G/DL (ref 6.4–8.3)
TROPONIN: 0.02 NG/ML (ref 0–0.03)
TROPONIN: 0.02 NG/ML (ref 0–0.03)
TSH SERPL DL<=0.05 MIU/L-ACNC: 3.98 UIU/ML (ref 0.27–4.2)
UROBILINOGEN, URINE: 0.2 E.U./DL
WBC # BLD: 6.8 E9/L (ref 4.5–11.5)

## 2020-06-30 PROCEDURE — 82550 ASSAY OF CK (CPK): CPT

## 2020-06-30 PROCEDURE — G0378 HOSPITAL OBSERVATION PER HR: HCPCS

## 2020-06-30 PROCEDURE — 70450 CT HEAD/BRAIN W/O DYE: CPT

## 2020-06-30 PROCEDURE — 84443 ASSAY THYROID STIM HORMONE: CPT

## 2020-06-30 PROCEDURE — 93880 EXTRACRANIAL BILAT STUDY: CPT

## 2020-06-30 PROCEDURE — 85025 COMPLETE CBC W/AUTO DIFF WBC: CPT

## 2020-06-30 PROCEDURE — 84484 ASSAY OF TROPONIN QUANT: CPT

## 2020-06-30 PROCEDURE — 80053 COMPREHEN METABOLIC PANEL: CPT

## 2020-06-30 PROCEDURE — 83880 ASSAY OF NATRIURETIC PEPTIDE: CPT

## 2020-06-30 PROCEDURE — 36415 COLL VENOUS BLD VENIPUNCTURE: CPT

## 2020-06-30 PROCEDURE — 93005 ELECTROCARDIOGRAM TRACING: CPT | Performed by: EMERGENCY MEDICINE

## 2020-06-30 PROCEDURE — 82553 CREATINE MB FRACTION: CPT

## 2020-06-30 PROCEDURE — 87186 SC STD MICRODIL/AGAR DIL: CPT

## 2020-06-30 PROCEDURE — 84439 ASSAY OF FREE THYROXINE: CPT

## 2020-06-30 PROCEDURE — 81003 URINALYSIS AUTO W/O SCOPE: CPT

## 2020-06-30 PROCEDURE — 99285 EMERGENCY DEPT VISIT HI MDM: CPT

## 2020-06-30 PROCEDURE — 87088 URINE BACTERIA CULTURE: CPT

## 2020-06-30 PROCEDURE — 2580000003 HC RX 258: Performed by: INTERNAL MEDICINE

## 2020-06-30 RX ORDER — LEVOTHYROXINE SODIUM 0.03 MG/1
25 TABLET ORAL DAILY
Status: DISCONTINUED | OUTPATIENT
Start: 2020-07-01 | End: 2020-07-02 | Stop reason: HOSPADM

## 2020-06-30 RX ORDER — PANTOPRAZOLE SODIUM 40 MG/1
40 TABLET, DELAYED RELEASE ORAL DAILY
Status: DISCONTINUED | OUTPATIENT
Start: 2020-07-01 | End: 2020-07-02 | Stop reason: HOSPADM

## 2020-06-30 RX ORDER — ACETAMINOPHEN 325 MG/1
650 TABLET ORAL EVERY 4 HOURS PRN
Status: DISCONTINUED | OUTPATIENT
Start: 2020-06-30 | End: 2020-07-02 | Stop reason: HOSPADM

## 2020-06-30 RX ORDER — DOCUSATE SODIUM 100 MG/1
100 CAPSULE, LIQUID FILLED ORAL DAILY PRN
Status: DISCONTINUED | OUTPATIENT
Start: 2020-06-30 | End: 2020-07-02 | Stop reason: HOSPADM

## 2020-06-30 RX ORDER — ROSUVASTATIN CALCIUM 5 MG/1
5 TABLET, COATED ORAL DAILY
Status: DISCONTINUED | OUTPATIENT
Start: 2020-07-01 | End: 2020-07-02 | Stop reason: HOSPADM

## 2020-06-30 RX ORDER — BUDESONIDE 0.5 MG/2ML
0.5 INHALANT ORAL 2 TIMES DAILY
Status: DISCONTINUED | OUTPATIENT
Start: 2020-06-30 | End: 2020-07-02 | Stop reason: HOSPADM

## 2020-06-30 RX ORDER — M-VIT,TX,IRON,MINS/CALC/FOLIC 27MG-0.4MG
1 TABLET ORAL DAILY
Status: DISCONTINUED | OUTPATIENT
Start: 2020-07-01 | End: 2020-07-02 | Stop reason: HOSPADM

## 2020-06-30 RX ORDER — TRIAMTERENE AND HYDROCHLOROTHIAZIDE 37.5; 25 MG/1; MG/1
1 TABLET ORAL EVERY OTHER DAY
Status: DISCONTINUED | OUTPATIENT
Start: 2020-07-02 | End: 2020-07-02 | Stop reason: HOSPADM

## 2020-06-30 RX ORDER — CITALOPRAM 20 MG/1
20 TABLET ORAL DAILY
Status: DISCONTINUED | OUTPATIENT
Start: 2020-07-01 | End: 2020-07-02 | Stop reason: HOSPADM

## 2020-06-30 RX ORDER — SODIUM CHLORIDE 0.9 % (FLUSH) 0.9 %
10 SYRINGE (ML) INJECTION EVERY 12 HOURS SCHEDULED
Status: DISCONTINUED | OUTPATIENT
Start: 2020-06-30 | End: 2020-07-02 | Stop reason: HOSPADM

## 2020-06-30 RX ORDER — SODIUM CHLORIDE 0.9 % (FLUSH) 0.9 %
10 SYRINGE (ML) INJECTION PRN
Status: DISCONTINUED | OUTPATIENT
Start: 2020-06-30 | End: 2020-07-02 | Stop reason: HOSPADM

## 2020-06-30 RX ORDER — FLUTICASONE PROPIONATE 110 UG/1
2 AEROSOL, METERED RESPIRATORY (INHALATION) 2 TIMES DAILY
Status: DISCONTINUED | OUTPATIENT
Start: 2020-06-30 | End: 2020-06-30 | Stop reason: CLARIF

## 2020-06-30 RX ORDER — LISINOPRIL 5 MG/1
5 TABLET ORAL DAILY
Status: DISCONTINUED | OUTPATIENT
Start: 2020-07-01 | End: 2020-07-02 | Stop reason: HOSPADM

## 2020-06-30 RX ORDER — MV-MIN/FA/VIT K/LUTEIN/ZEAXANT 200MCG-5MG
1 CAPSULE ORAL 2 TIMES DAILY
Status: DISCONTINUED | OUTPATIENT
Start: 2020-06-30 | End: 2020-06-30 | Stop reason: CLARIF

## 2020-06-30 RX ORDER — PANTOPRAZOLE SODIUM 40 MG/1
40 TABLET, DELAYED RELEASE ORAL DAILY
COMMUNITY
End: 2020-11-12 | Stop reason: SDUPTHER

## 2020-06-30 RX ADMIN — SODIUM CHLORIDE, PRESERVATIVE FREE 10 ML: 5 INJECTION INTRAVENOUS at 21:47

## 2020-06-30 ASSESSMENT — PAIN SCALES - GENERAL: PAINLEVEL_OUTOF10: 0

## 2020-07-01 ENCOUNTER — APPOINTMENT (OUTPATIENT)
Dept: MRI IMAGING | Age: 85
End: 2020-07-01
Payer: MEDICARE

## 2020-07-01 LAB
ALBUMIN SERPL-MCNC: 3.3 G/DL (ref 3.5–5.2)
ALP BLD-CCNC: 53 U/L (ref 40–129)
ALT SERPL-CCNC: 18 U/L (ref 0–40)
ANION GAP SERPL CALCULATED.3IONS-SCNC: 10 MMOL/L (ref 7–16)
AST SERPL-CCNC: 19 U/L (ref 0–39)
BILIRUB SERPL-MCNC: 0.5 MG/DL (ref 0–1.2)
BUN BLDV-MCNC: 24 MG/DL (ref 8–23)
CALCIUM SERPL-MCNC: 8.8 MG/DL (ref 8.6–10.2)
CHLORIDE BLD-SCNC: 104 MMOL/L (ref 98–107)
CHOLESTEROL, TOTAL: 187 MG/DL (ref 0–199)
CK MB: 4.1 NG/ML (ref 0–7.7)
CK MB: 4.6 NG/ML (ref 0–7.7)
CO2: 25 MMOL/L (ref 22–29)
CREAT SERPL-MCNC: 1 MG/DL (ref 0.7–1.2)
GFR AFRICAN AMERICAN: >60
GFR NON-AFRICAN AMERICAN: >60 ML/MIN/1.73
GLUCOSE BLD-MCNC: 107 MG/DL (ref 74–99)
HDLC SERPL-MCNC: 47 MG/DL
LDL CHOLESTEROL CALCULATED: 123 MG/DL (ref 0–99)
POTASSIUM SERPL-SCNC: 4.2 MMOL/L (ref 3.5–5)
SODIUM BLD-SCNC: 139 MMOL/L (ref 132–146)
TOTAL CK: 65 U/L (ref 20–200)
TOTAL CK: 71 U/L (ref 20–200)
TOTAL PROTEIN: 5.5 G/DL (ref 6.4–8.3)
TRIGL SERPL-MCNC: 87 MG/DL (ref 0–149)
TROPONIN: 0.01 NG/ML (ref 0–0.03)
TROPONIN: 0.02 NG/ML (ref 0–0.03)
VLDLC SERPL CALC-MCNC: 17 MG/DL

## 2020-07-01 PROCEDURE — 6370000000 HC RX 637 (ALT 250 FOR IP): Performed by: INTERNAL MEDICINE

## 2020-07-01 PROCEDURE — 82550 ASSAY OF CK (CPK): CPT

## 2020-07-01 PROCEDURE — 82553 CREATINE MB FRACTION: CPT

## 2020-07-01 PROCEDURE — 97161 PT EVAL LOW COMPLEX 20 MIN: CPT | Performed by: PHYSICAL THERAPIST

## 2020-07-01 PROCEDURE — 6360000002 HC RX W HCPCS: Performed by: INTERNAL MEDICINE

## 2020-07-01 PROCEDURE — 70553 MRI BRAIN STEM W/O & W/DYE: CPT

## 2020-07-01 PROCEDURE — 80061 LIPID PANEL: CPT

## 2020-07-01 PROCEDURE — G0378 HOSPITAL OBSERVATION PER HR: HCPCS

## 2020-07-01 PROCEDURE — 36415 COLL VENOUS BLD VENIPUNCTURE: CPT

## 2020-07-01 PROCEDURE — 6360000004 HC RX CONTRAST MEDICATION: Performed by: RADIOLOGY

## 2020-07-01 PROCEDURE — 2580000003 HC RX 258: Performed by: INTERNAL MEDICINE

## 2020-07-01 PROCEDURE — 84484 ASSAY OF TROPONIN QUANT: CPT

## 2020-07-01 PROCEDURE — 80053 COMPREHEN METABOLIC PANEL: CPT

## 2020-07-01 PROCEDURE — A9579 GAD-BASE MR CONTRAST NOS,1ML: HCPCS | Performed by: RADIOLOGY

## 2020-07-01 PROCEDURE — 94640 AIRWAY INHALATION TREATMENT: CPT

## 2020-07-01 RX ADMIN — BUDESONIDE 500 MCG: 0.5 INHALANT RESPIRATORY (INHALATION) at 05:12

## 2020-07-01 RX ADMIN — PANTOPRAZOLE SODIUM 40 MG: 40 TABLET, DELAYED RELEASE ORAL at 05:25

## 2020-07-01 RX ADMIN — CITALOPRAM HYDROBROMIDE 20 MG: 20 TABLET ORAL at 08:49

## 2020-07-01 RX ADMIN — ROSUVASTATIN CALCIUM 5 MG: 5 TABLET, FILM COATED ORAL at 08:49

## 2020-07-01 RX ADMIN — MULTIPLE VITAMINS W/ MINERALS TAB 1 TABLET: TAB at 08:49

## 2020-07-01 RX ADMIN — SODIUM CHLORIDE, PRESERVATIVE FREE 10 ML: 5 INJECTION INTRAVENOUS at 08:50

## 2020-07-01 RX ADMIN — LEVOTHYROXINE SODIUM 25 MCG: 25 TABLET ORAL at 05:25

## 2020-07-01 RX ADMIN — SODIUM CHLORIDE, PRESERVATIVE FREE 10 ML: 5 INJECTION INTRAVENOUS at 21:30

## 2020-07-01 RX ADMIN — GADOTERIDOL 16 ML: 279.3 INJECTION, SOLUTION INTRAVENOUS at 12:10

## 2020-07-01 RX ADMIN — LISINOPRIL 5 MG: 5 TABLET ORAL at 08:49

## 2020-07-01 ASSESSMENT — PAIN SCALES - GENERAL
PAINLEVEL_OUTOF10: 0
PAINLEVEL_OUTOF10: 0

## 2020-07-01 NOTE — CONSULTS
[address]                                  CONSULTATION    PATIENT NAME: Basil Galvan                      :        1935  MED REC NO:   [id number]                            ROOM:       4550  ACCOUNT NO:   [de-identified]                           ADMIT DATE: 2020  PROVIDER:     Jeny Gao MD    CONSULT DATE:  2020    HISTORY OF PRESENT ILLNESS:  The patient is an 25-year-old gentleman. According to him, I saw him about 10 years ago in my office for cardiac  evaluation that was unremarkable. I did not see him since then. The patient presented to the hospital with complaints of dizziness. He  was felt room was spinning around. No complaints of chest pain. The patient had EKG in ER. The ER physician was concerned about some ST  depression in the lateral leads. The patient was admitted. Cardiac consult was requested. The patient was lying semiupright in bed when I came to see him. He did  not appear in acute distress. He was chest pain free. PAST MEDICAL HISTORY:  As above plus history of hypothyroidism. Hypertension. Depression. Prostate enlargement. Acid reflux. MEDICATIONS PRIOR TO ADMISSION:  1. Crestor. 2.  Flovent. 3.  Synthroid. 4.  Maxzide. 5.  Lisinopril. 6.  Celexa. 7.  Multivitamin. 8.  Protonix. 9.  Flomax. REVIEW OF SYSTEM:  CONSTITUTIONAL:  No fever or chills. HEENT:  No  nosebleed. No hearing problem. No double vision. No stridor. No  hoarseness of the voice. CARDIAC:  No chest pain. No palpitation. PULMONARY:  No shortness of breath. No cough. GASTROENTEROLOGY:  No  abdominal pain. No vomiting. No diarrhea. GENITOURINARY:  Prostate  enlargement. No dysuria or frequency. NEURO:  No clear history of  seizure or stroke. No syncope. Dizziness. HEMATOLOGY:  No bleeding  disorder. No adenopathy.   ENDOCRINOLOGY:  No polyuria or polydipsia. SKIN:  No skin disorder. MUSCULOSKELETAL:  Unremarkable. PSYCH:  The  patient is on Celexa for history of depression. PHYSICAL EXAMINATION:  GENERAL:  The patient was sitting up, in no acute distress. VITAL SIGNS:  Blood pressure 131/80, pulse 90. The patient is afebrile. NECK:  No JVD. HEART:  Regular S1 and S2. No S3.  1/6 systolic murmur heard best at  the left sternal border. LUNGS:  No rales, no wheezing. ABDOMEN:  Soft and tender  EXTREMITIES:  Almost no edema. Palpable pulses in feet. NEURO:  Alert and awake with no focal deficits. 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 No acute ST-segment changes. LABS:  WBC 6.8, hemoglobin 14, platelet count 487. Sodium 140,  potassium 4.4, BUN 26, creatinine 1.1. ProBNP is normal 273. Head CT scan was unremarkable. Troponins are all negative. IMPRESSION:  The patient was admitted with feelings of dizziness. He gets dizziness when he moves his head and it sounds more from  vertigo. He states that he had history of vertigo in the past.    From the cardiac standpoint, the patient had bifascicular block on the  EKG with right bundle-branch block and left anterior fascicular block. I did not see however any ST-segment depression in the lateral leads as  mentioned in his ER note. The patient with right bundle-branch block  and get secondary ST-T wave changes. The patient has no complaints of chest pain and his troponin is negative  and with that, I do not see need for further coronary evaluation.         Rc Perkins MD    D: 07/01/2020 11:26:06       T: 07/01/2020 11:38:36     [email or web address]  Job#: [id number]     Doc#: [id number]    CC:

## 2020-07-01 NOTE — PROGRESS NOTES
Patient seen and examined history and physical dictated dictation #[id number] impression #1  Dizziness  EKG changes  Hypertension  Hyperlipidemia  Hypothyroidism  Asthma  Depression controlled      Blood pressure  Orthostatic vital sign  Carotid ultrasound 1 4 February  Monitor r/o arrythmia  MRI brain  Cardiology and neurology consulted  Physical therapy

## 2020-07-01 NOTE — PROGRESS NOTES
Physical Therapy Initial Evaluation    Room #:  8737/3602-91  Patient Name: Kendra Lizarraga  YOB: 1935  MRN: [id number]    Referring Provider: Santos Amado MD    Date of Service: 7/1/2020    Evaluating Physical Therapist:  Lane Dowd, PT #[id number]      Diagnosis:   Dizziness [R42]        Patient Active Problem List   Diagnosis    Right inguinal hernia    Dizziness        Tentative placement recommendation: Outpatient Rehab if needed for vestibular  Equipment recommendation:  To be determined      Prior Level of Function: Patient ambulated independently    Rehab Potential: good  for baseline    Past medical history:   Past Medical History:   Diagnosis Date    Arthritis     Asthma     Hyperlipidemia     Hypertension     Macular degeneration     PONV (postoperative nausea and vomiting)     Thyroid disease      Past Surgical History:   Procedure Laterality Date    BACK SURGERY      1989    COLONOSCOPY      ENDOSCOPY, COLON, DIAGNOSTIC      EYE SURGERY      viviane cataracts    HERNIA REPAIR      x2  1955, [place] Right 3/9/2020    LAPAROSCOPIC HERNIA RIGHT  INGUINAL REPAIR WITH MESH ROBOTIC XI performed by Hesham Ashton MD at [facility]         Precautions: falls and alarm ,      SUBJECTIVE:    Social history: Patient lives alone  in a ranch home  with 4 steps  to enter bilateral Rail  Walk in shower grab bars also has tub shower    Equipment owned: [employer], [address] and [address] chair,  Unused had been spouses     [address] [facility] [facility]   How much difficulty turning over in bed?: A Little  How much difficulty sitting down on / standing up from a chair with arms?: A Little  How much difficulty moving from lying on back to sitting on side of bed?: A Little  How much help from another person moving to and from a bed to a chair?: A Little  How much help from another person needed to walk in hospital room?: A Little  How much

## 2020-07-02 VITALS
SYSTOLIC BLOOD PRESSURE: 144 MMHG | RESPIRATION RATE: 18 BRPM | DIASTOLIC BLOOD PRESSURE: 78 MMHG | OXYGEN SATURATION: 94 % | BODY MASS INDEX: 26.13 KG/M2 | HEART RATE: 88 BPM | WEIGHT: 176.4 LBS | TEMPERATURE: 98 F | HEIGHT: 69 IN

## 2020-07-02 LAB
EKG ATRIAL RATE: 100 BPM
EKG P AXIS: 36 DEGREES
EKG P-R INTERVAL: 186 MS
EKG Q-T INTERVAL: 362 MS
EKG QRS DURATION: 134 MS
EKG QTC CALCULATION (BAZETT): 466 MS
EKG R AXIS: -36 DEGREES
EKG T AXIS: -22 DEGREES
EKG VENTRICULAR RATE: 100 BPM

## 2020-07-02 PROCEDURE — 93010 ELECTROCARDIOGRAM REPORT: CPT | Performed by: INTERNAL MEDICINE

## 2020-07-02 PROCEDURE — 6370000000 HC RX 637 (ALT 250 FOR IP): Performed by: INTERNAL MEDICINE

## 2020-07-02 PROCEDURE — G0378 HOSPITAL OBSERVATION PER HR: HCPCS

## 2020-07-02 PROCEDURE — 6360000002 HC RX W HCPCS: Performed by: INTERNAL MEDICINE

## 2020-07-02 PROCEDURE — 94640 AIRWAY INHALATION TREATMENT: CPT

## 2020-07-02 RX ADMIN — PANTOPRAZOLE SODIUM 40 MG: 40 TABLET, DELAYED RELEASE ORAL at 05:10

## 2020-07-02 RX ADMIN — LISINOPRIL 5 MG: 5 TABLET ORAL at 09:56

## 2020-07-02 RX ADMIN — CITALOPRAM HYDROBROMIDE 20 MG: 20 TABLET ORAL at 09:56

## 2020-07-02 RX ADMIN — MULTIPLE VITAMINS W/ MINERALS TAB 1 TABLET: TAB at 09:56

## 2020-07-02 RX ADMIN — BUDESONIDE 500 MCG: 0.5 INHALANT RESPIRATORY (INHALATION) at 07:48

## 2020-07-02 RX ADMIN — LEVOTHYROXINE SODIUM 25 MCG: 25 TABLET ORAL at 05:10

## 2020-07-02 RX ADMIN — ROSUVASTATIN CALCIUM 5 MG: 5 TABLET, FILM COATED ORAL at 09:56

## 2020-07-02 ASSESSMENT — PAIN SCALES - GENERAL: PAINLEVEL_OUTOF10: 0

## 2020-07-02 NOTE — PROGRESS NOTES
Occupational Therapy  OCCUPATIONAL THERAPY    Date:2020  Patient Name: Zi Becker  MRN: [id number]  : 1935  Room: [address] [place] [address]      Occupational Therapy order received, chart reviewed and screen performed. Pt has no skilled occupational therapy needs at this time, please re-order occupational therapy if status changes.            Bull Crespo, OTR/L #[id number]

## 2020-07-02 NOTE — PROGRESS NOTES
Occupational Therapy  OT SESSION ATTEMPT     Date:2020  Patient Name: Shari Elizalde  MRN: [id number]  : 1935  Room: [address] [place] [address]     Attempted OT session this date:    [] unavailable due to other medical staff currently with pt   [] on hold per nursing staff   [] on hold per nursing staff secondary to lab / radiology results    [] declined treatment  this date due to ____. Benefits of participation in therapy reviewed with pt.    [] off unit   [x] Other: First attempt, Pt Zooming with family member. Second attempt, spiritual care with pt. Will reattempt OT evaluation and/or treatment at a later time.     Que Michelle, OTR/L #[id number]

## 2020-07-02 NOTE — H&P
[address]                              HISTORY AND PHYSICAL    PATIENT NAME: Ten Nielsen                      :        1935  MED REC NO:   [id number]                            ROOM:       4950  ACCOUNT NO:   [de-identified]                           ADMIT DATE: 2020  PROVIDER:     Sahra Islas MD    CHIEF COMPLAINT:  Dizziness, lightheadedness, EKG changes in the ER. HISTORY OF PRESENT ILLNESS:  This is an 26-year-old male with known  history of hypertension, depression, anxiety, and asthma, who has been  feeling lightheaded and dizzy for the past one week to 10 days. Other  day, he was mowing the lawn, half way through he felt that he may have  fallen down. Everything got black and white. He still continued working  and finished the job. Then, it happened again yesterday, so he came to  the ER. EKG shows right bundle-branch block and left anterior  fascicular block. There were some nonspecific ST-T changes. He was  admitted to rule out cardiac etiology or arrhythmia as the above reason. He denies any headache. PAST MEDICAL HISTORY:  Hypertension, asthma, depression, hiatus hernia. Stress test in 2012 was negative. Nasal polyp. Brain MRI 2012  negative. COPD, lung nodule in the past, seen by pulmonologist in  [facility]. PPH, history of herpes zoster. Benign positional vertigo. He has seen ENT doctor. Peptic ulcer, ultrasound of the right upper  quadrant and abdominal aorta in 2018 negative. Lumbar radiculopathy,  osteoarthritis of the knees. Has also seen pulmonologist and his nurse. He had a repeat ultrasound in 2017. He was given prescription for  Flovent inhaler. Hyperlipidemia, hypoglycemia. Has also seen Dr. Angela Wilde in the past.  There was no history of DVT or PE. PAST SURGICAL HISTORY:  Lumbar disc discectomy in , inguinal hernia  repair. Cardiac catheterization in 2012, negative ______;  colonoscopy, endoscopy, 2018 by Dr. Santy Wright. SOCIAL HISTORY:  No tobacco, alcohol, or drug. , lives alone. FAMILY HISTORY:  Father  at age 79 of lung cancer, heavy smoker. Mother at the age 80 and had bladder cancer. Never smoked. Brother  with diabetes. Sister healthy. ALLERGIES:   PENICILLIN, rash; LIPITOR, myalgia; and PRAVASTATIN, muscle  cramps. MEDICATIONS:  Maxzide 25 half tablet, Celexa 20, aspirin 81, ProAir,  Flovent inhaler, Synthroid 25, lisinopril 5, Protonix 40, Crestor 5. REVIEW OF SYSTEMS:  CARDIAC:  Cardiac, denies any chest pain or palpitation. No family  history of CHF. Has seen Dr. Radha Hung in the past.  He had right  bundle-branch block before. He had a cardiac catheterization in 2012  with Dr. Radha Hung, which was negative. RESPIRATORY:  Denies any cough or expectoration. No history of DVT or  PE.  NEURO:  Complains of dizziness. No headache. No blurred vision. No  weakness in upper or lower extremity. No change in vision. No problem  hearing. He says in the past he had some therapy done for the vertigo,  which has helped him. GI:  Denies any nausea, vomiting, or diarrhea. No abdominal pain. No  change in bowel habits. He has a good appetite. :  No dysuria or frequency. No hematuria. No urine difficulties. His cholesterol has been high, coming down. Other review of systems are  negative. PHYSICAL EXAMINATION:  GENERAL:  Appears comfortable. Daughter sitting by the bedside. VITAL SIGNS:  Temperature 98.2, pulse 91, respirations 20, blood  pressure 154/77, and O2 sat 96% on room air. HEENT:  Sclerae anicteric. Pupils equal bilaterally. Extraocular  muscles intact. NECK:  No JVD or adenopathy. No bruit. CHEST:  No localized tenderness or axillary adenopathy. LUNGS:  Clear. No rales or rhonchi. No crackles. CORONARY:  Regular. S1, S2 normal.  No S3.   No gallop or rub.  ABDOMEN:  Soft, nontender. No masses. EXTREMITIES:  No edema. No calf tenderness. Homans test negative. NEURO:  Alert, awake, and oriented. No focal deficit. No cerebellar  signs. LABORATORY DATA:  EKG shows sinus rhythm, right bundle-branch block,  left anterior fascicular block. No acute changes. Sodium 139,  potassium 4.2, BUN 24, creatinine 1, glucose 107. Cardiac enzymes three  sets are negative. Liver enzymes are normal.  WBC is 6.8, hemoglobin  14, and platelet count of 455. Urine test was negative. CT head, no  acute pathology. IMPRESSION:  1. Dizziness, possibly vertigo. 2.  Abnormal EKG. 3.  Asthma. 4.  Hypertension. 5.  Hyperlipidemia. 6.  Hypothyroidism. 7.  Depression. 8.  History of hemorrhoid. 9.  History of lung nodule, stable, seen a doctor in [place],  pulmonologist.    PLAN:  Admit to monitor floor. Watch for any arrhythmias on the  monitor. Cardiology, Dr. Anthony Bran, has been consulted. The patient is  known to him. Carotid ultrasound. MRI of the brain. He could not  tolerate high dose of statin. Lipitor gave him severe muscle cramps. His last cholesterol was 220 with LDL of 147 in the office last month,  which was coming down by at least 40 points. We will continue that. Continue baby aspirin and subcutaneous Lovenox for DVT prophylaxis. Same blood pressure medication. Check blood pressure lying and standing  every shift. Await Cardiology input.     Dylon Cobos MD    D: 07/01/2020 17:13:33       T: 07/01/2020 18:52:25     [email or web address]  Job#: [id number]     Doc#: [id number]

## 2020-07-02 NOTE — CONSULTS
History Of Present Illness: CHIEF COMPLAINT:  Dizziness, lightheadedness, EKG changes in the ER.     HISTORY OF PRESENT ILLNESS:  This is an 70-year-old male with known  history of hypertension, depression, anxiety, and asthma, who has been  feeling lightheaded and dizzy for the past one week to 10 days. Other  day, he was mowing the lawn, half way through he felt that he may have  fallen down. Everything got black and white. He still continued working  and finished the job. Then, it happened again yesterday, so he came to  the ER. EKG shows right bundle-branch block and left anterior  fascicular block. There were some nonspecific ST-T changes. He was  admitted to rule out cardiac etiology or arrhythmia as the above reason. He denies any headache. As above per Dr. Barber Merritt    The patient is a 80 y.o. male with significant past medical history of see below who presents with above. The patient has the following symptoms:    Change in level of consciousness: alert    New Weakness: no    Numbness or Tingling: no    Difficulty Swallowing: no    Current Medications:   Scheduled Meds:   sodium chloride flush  10 mL Intravenous 2 times per day    citalopram  20 mg Oral Daily    levothyroxine  25 mcg Oral Daily    lisinopril  5 mg Oral Daily    pantoprazole  40 mg Oral Daily    rosuvastatin  5 mg Oral Daily    triamterene-hydroCHLOROthiazide  1 tablet Oral Every Other Day    budesonide  0.5 mg Nebulization BID    therapeutic multivitamin-minerals  1 tablet Oral Daily     Continuous Infusions:  PRN Meds:sodium chloride flush, acetaminophen, docusate sodium    Allergies:  Pcn [penicillins] and Prednisone    Social History:   TOBACCO:   reports that he has never smoked. He has never used smokeless tobacco.  ETOH:   reports current alcohol use.     Past Medical History:        Diagnosis Date    Arthritis     Asthma     Hyperlipidemia     Hypertension     Macular degeneration     PONV (postoperative nausea and vomiting)     Thyroid disease        Past Surgical History:        Procedure Laterality Date    BACK SURGERY      1989    COLONOSCOPY      ENDOSCOPY, COLON, DIAGNOSTIC      EYE SURGERY      viviane cataracts    HERNIA REPAIR      x2  1955, 1973    HERNIA REPAIR Right 3/9/2020    LAPAROSCOPIC HERNIA RIGHT  INGUINAL REPAIR WITH MESH ROBOTIC XI performed by Modesta Peralta MD at [place] [address]           Outside reports reviewed: ER records, historical medical records, lab reports and radiology reports. Patient's medications, allergies, past medical, surgical, social and family histories were reviewed and updated as appropriate. Review of Systems  A comprehensive review of systems was negative except for:       Objective:     Neuro exam 138/78, pulse 80, he is afebrile  General: normal orientation and alertness. Cranial nerve testing was normal.  Funduscopic eye exam revealed not testable. Motor exam: 5-/5. Deep tendon reflexes were 1+ bilaterally. Plantar responses were flexor bilaterally. Cerebellar exam noted finger to nose without dysmetria. Gait is moderately wide-based but he walks without assist.  Sensation was normal to joint position sense, light touch and a pin prick . Coty Staggers Assessment:   Dizziness likely on the basis of anxiety and possible panic attacks. Patient has longstanding history of anxiety and panic attacks. MRI of brain normal.        Plan:   Physical therapy for gait balance and conditioning. Consider substituting Celexa with Zoloft. Cardiac evaluation. Happy to see him in office in follow-up at discretion of primary care.   Thank you for consultation

## 2020-07-02 NOTE — ACP (ADVANCE CARE PLANNING)
Met with pt. For discussion about Advanced Care Planning wishes. Pt. Is unsure if he has a Living Will, but confirms that children listed as emergency contacts are correct. Pt. Will discuss with his adult children.

## 2020-07-03 LAB
ORGANISM: ABNORMAL
URINE CULTURE, ROUTINE: ABNORMAL

## 2020-07-04 NOTE — DISCHARGE SUMMARY
see the ENT doctor because  he has a history of vertigo also. The patient was stable at the time of  discharge. LABORATORY DATA:  Sodium 139, potassium 4.2, BUN 24, creatinine 1. Maxzide was discontinued. Cholesterol 187, . WBC 6.8,  hemoglobin 14, platelet count of 210. MEDICATIONS:  Ascorbic acid, multivitamin, Crestor, Flovent inhaler,  Synthroid 25, Zestril 5, Celexa 40, Protonix 40, _____ Flomax 0.4. Discontinue Maxzide. Follow up in the office in one week.           Bobo Paula MD    D: 07/03/2020 17:40:17       T: 07/04/2020 0:16:44     [email or web address]  Job#: [id number]     Doc#: [id number]    CC:

## 2020-07-16 NOTE — ED PROVIDER NOTES
80-year-old male presenting with dizziness. Is been ongoing for about a month with intermittent lightheadedness and shortness of breath. Has seen his family doctor, no syncope, no overt chest pain or lightheadedness currently. He has not been falling over passing out now. Family History   Problem Relation Age of Onset    Cancer Mother     Cancer Father     High Blood Pressure Sister     High Blood Pressure Brother      Past Surgical History:   Procedure Laterality Date    BACK SURGERY      1989    COLONOSCOPY      ENDOSCOPY, COLON, DIAGNOSTIC      EYE SURGERY      viviane cataracts    HERNIA REPAIR      x2  1955, 1973    HERNIA REPAIR Right 3/9/2020    LAPAROSCOPIC HERNIA RIGHT  INGUINAL REPAIR WITH MESH ROBOTIC XI performed by Hesham Ashton MD at [place] [address]         Review of Systems   Constitutional: Negative for chills and fever. Neurological: Positive for dizziness and light-headedness. Negative for syncope. All other systems reviewed and are negative. Physical Exam  Constitutional:       General: He is not in acute distress. Appearance: He is well-developed. HENT:      Head: Normocephalic and atraumatic. Eyes:      Pupils: Pupils are equal, round, and reactive to light. Neck:      Musculoskeletal: Normal range of motion and neck supple. Thyroid: No thyromegaly. Cardiovascular:      Rate and Rhythm: Normal rate and regular rhythm. Pulmonary:      Effort: Pulmonary effort is normal. No respiratory distress. Breath sounds: Normal breath sounds. No wheezing. Abdominal:      General: There is no distension. Palpations: Abdomen is soft. There is no mass. Tenderness: There is no abdominal tenderness. There is no guarding or rebound. Musculoskeletal: Normal range of motion. General: No tenderness. Skin:     General: Skin is warm and dry. Findings: No erythema.    Neurological:      Mental Status: He is alert and feels fine at this time. States that occasionally he will have dizzy spells when he lays his head back. The patient does have an abnormal EKG, but I have no comparison. He does see  for cardiology. I will place a phone call to his PCP to discuss the findings and future follow-up. [MS]   [phone] with . Discussed case. The EKG changes are new per PCP. The ST depressions in 1 and aVL are new. He recommends admission. Orders were placed. [MS]      ED Course User Index  [MS] Demetrius Reno, DO       --------------------------------------------- PAST HISTORY ---------------------------------------------  Past Medical History:  has a past medical history of Arthritis, Asthma, Hyperlipidemia, Hypertension, Macular degeneration, PONV (postoperative nausea and vomiting), and Thyroid disease. Past Surgical History:  has a past surgical history that includes eye surgery; sinus surgery; back surgery; hernia repair; Colonoscopy; Endoscopy, colon, diagnostic; and hernia repair (Right, 3/9/2020). Social History:  reports that he has never smoked. He has never used smokeless tobacco. He reports current alcohol use. He reports that he does not use drugs. Family History: family history includes Cancer in his father and mother; High Blood Pressure in his brother and sister. The patients home medications have been reviewed.     Allergies: Pcn [penicillins] and Prednisone    -------------------------------------------------- RESULTS -------------------------------------------------    LABS:  Results for orders placed or performed during the hospital encounter of 06/30/20   Culture, Urine    Specimen: Urine, clean catch   Result Value Ref Range    Organism Escherichia coli (A)     Urine Culture, Routine <25,000 CFU/ml        Susceptibility    Escherichia coli - BACTERIAL SUSCEPTIBILITY PANEL BY KENNEDY     ampicillin <=^2 Sensitive mcg/mL     ceFAZolin <=^4 Sensitive mcg/mL     cefepime <=^0.12 Sensitive mcg/mL     cefTRIAXone <=^0.25 Sensitive mcg/mL     Confirmatory Extended Spectrum Beta-Lactamase Neg  mcg/mL     ertapenem <=^0.12 Sensitive mcg/mL     gentamicin <=^1 Sensitive mcg/mL     levofloxacin <=^0.12 Sensitive mcg/mL     nitrofurantoin <=^16 Sensitive mcg/mL     piperacillin-tazobactam <=^4 Sensitive mcg/mL     trimethoprim-sulfamethoxazole <=^20 Sensitive mcg/mL   CBC auto differential   Result Value Ref Range    WBC 6.8 4.5 - 11.5 E9/L    RBC 4.26 3.80 - 5.80 E12/L    Hemoglobin 14.0 12.5 - 16.5 g/dL    Hematocrit 43.4 37.0 - 54.0 %    .9 (H) 80.0 - 99.9 fL    MCH 32.9 26.0 - 35.0 pg    MCHC 32.3 32.0 - 34.5 %    RDW 13.8 11.5 - 15.0 fL    Platelets 304 043 - 805 E9/L    MPV 9.8 7.0 - 12.0 fL    Neutrophils % 70.9 43.0 - 80.0 %    Immature Granulocytes % 0.7 0.0 - 5.0 %    Lymphocytes % 19.4 (L) 20.0 - 42.0 %    Monocytes % 8.1 2.0 - 12.0 %    Eosinophils % 0.6 0.0 - 6.0 %    Basophils % 0.3 0.0 - 2.0 %    Neutrophils Absolute 4.80 1.80 - 7.30 E9/L    Immature Granulocytes # 0.05 E9/L    Lymphocytes Absolute 1.31 (L) 1.50 - 4.00 E9/L    Monocytes Absolute 0.55 0.10 - 0.95 E9/L    Eosinophils Absolute 0.04 (L) 0.05 - 0.50 E9/L    Basophils Absolute 0.02 0.00 - 0.20 E9/L   Comprehensive Metabolic Panel   Result Value Ref Range    Sodium 140 132 - 146 mmol/L    Potassium 4.4 3.5 - 5.0 mmol/L    Chloride 100 98 - 107 mmol/L    CO2 28 22 - 29 mmol/L    Anion Gap 12 7 - 16 mmol/L    Glucose 123 (H) 74 - 99 mg/dL    BUN 26 (H) 8 - 23 mg/dL    CREATININE 1.1 0.7 - 1.2 mg/dL    GFR Non-African American >60 >=60 mL/min/1.73    GFR African American >60     Calcium 9.6 8.6 - 10.2 mg/dL    Total Protein 6.8 6.4 - 8.3 g/dL    Alb 4.0 3.5 - 5.2 g/dL    Total Bilirubin 0.5 0.0 - 1.2 mg/dL    Alkaline Phosphatase 65 40 - 129 U/L    ALT 22 0 - 40 U/L    AST 25 0 - 39 U/L   Troponin   Result Value Ref Range    Troponin 0.02 0.00 - 0.03 ng/mL   Brain Natriuretic Peptide   Result Value Ref Range Pro- 0 - 450 pg/mL   Urinalysis   Result Value Ref Range    Color, UA Yellow Straw/Yellow    Clarity, UA Clear Clear    Glucose, Ur Negative Negative mg/dL    Bilirubin Urine Negative Negative    Ketones, Urine Negative Negative mg/dL    Specific Gravity, UA 1.025 1.005 - 1.030    Blood, Urine Negative Negative    pH, UA 6.0 5.0 - 9.0    Protein, UA Negative Negative mg/dL    Urobilinogen, Urine 0.2 <2.0 E.U./dL    Nitrite, Urine Negative Negative    Leukocyte Esterase, Urine Negative Negative   TSH without Reflex   Result Value Ref Range    TSH 3.980 0.270 - 4.200 uIU/mL   T4, Free   Result Value Ref Range    T4 Free 1.28 0.93 - 1.70 ng/dL   CK   Result Value Ref Range    Total CK 65 20 - 200 U/L   CK   Result Value Ref Range    Total CK 65 20 - 200 U/L   CK MB   Result Value Ref Range    CK-MB 4.8 0.0 - 7.7 ng/mL   CK MB   Result Value Ref Range    CK-MB 4.1 0.0 - 7.7 ng/mL   Troponin   Result Value Ref Range    Troponin 0.02 0.00 - 0.03 ng/mL   Troponin   Result Value Ref Range    Troponin 0.02 0.00 - 0.03 ng/mL   Comprehensive metabolic panel   Result Value Ref Range    Sodium 139 132 - 146 mmol/L    Potassium 4.2 3.5 - 5.0 mmol/L    Chloride 104 98 - 107 mmol/L    CO2 25 22 - 29 mmol/L    Anion Gap 10 7 - 16 mmol/L    Glucose 107 (H) 74 - 99 mg/dL    BUN 24 (H) 8 - 23 mg/dL    CREATININE 1.0 0.7 - 1.2 mg/dL    GFR Non-African American >60 >=60 mL/min/1.73    GFR African American >60     Calcium 8.8 8.6 - 10.2 mg/dL    Total Protein 5.5 (L) 6.4 - 8.3 g/dL    Alb 3.3 (L) 3.5 - 5.2 g/dL    Total Bilirubin 0.5 0.0 - 1.2 mg/dL    Alkaline Phosphatase 53 40 - 129 U/L    ALT 18 0 - 40 U/L    AST 19 0 - 39 U/L   Lipid panel   Result Value Ref Range    Cholesterol, Total 187 0 - 199 mg/dL    Triglycerides 87 0 - 149 mg/dL    HDL 47 >40 mg/dL    LDL Calculated 123 (H) 0 - 99 mg/dL    VLDL Cholesterol Calculated 17 mg/dL   CK   Result Value Ref Range    Total CK 71 20 - 200 U/L   CK MB   Result Value Ref Range CK-MB 4.6 0.0 - 7.7 ng/mL   Troponin   Result Value Ref Range    Troponin 0.01 0.00 - 0.03 ng/mL   EKG 12 Lead   Result Value Ref Range    Ventricular Rate 100 BPM    Atrial Rate 100 BPM    P-R Interval 186 ms    QRS Duration 134 ms    Q-T Interval 362 ms    QTc Calculation (Bazett) 466 ms    P Axis 36 degrees    R Axis -36 degrees    T Axis -22 degrees       RADIOLOGY:  MRI BRAIN W WO CONTRAST   Final Result   1. No evidence of acute infarct. 2. Cerebral parenchymal volume loss with moderate-severe chronic   microvascular white matter ischemic disease noted both supra and   infratentorially. 3. Chronic left thalamic infarct. 4. No areas of abnormal enhancement to suggest an underlying mass. US CAROTID ARTERY BILATERAL   Final Result   1. No hemodynamically significant stenoses in the internal carotid arteries. 2. Patent vertebral arteries with antegrade flow. CT Head WO Contrast   Final Result   No acute intracranial abnormality. Specifically, there is no acute   intracranial hemorrhage      Atrophy and periventricular leukomalacia               ------------------------- NURSING NOTES AND VITALS REVIEWED ---------------------------  Date / Time Roomed:  6/30/2020  2:03 PM  ED Bed Assignment:  [id number]    The nursing notes within the ED encounter and vital signs as below have been reviewed. No data found. Oxygen Saturation Interpretation: Normal    ------------------------------------------ PROGRESS NOTES ------------------------------------------  Re-evaluation(s):    Patients symptoms show no change  Repeat physical examination is not changed    Counseling:  I have spoken with the patient and discussed todays results, in addition to providing specific details for the plan of care and counseling regarding the diagnosis and prognosis.   Their questions are answered at this time and they are agreeable with the plan of admission.    --------------------------------- ADDITIONAL PROVIDER NOTES ---------------------------------  Consultations:  I have signed this patient out to the oncoming physician, Dr. Lia Mcghee. I have discussed the patient's initial exam, treatment and plan of care with the on coming physician. I have notified the patient / family of the change in treating physician and answered their questions to this point. This patient has remained hemodynamically stable during their ED course. Diagnosis:  1. Dizziness    2. Acute electrocardiogram changes        Disposition:  Patient's disposition: Admit to telemetry  Patient's condition is stable.              Veronica Hobson DO  07/16/20 [id number]

## 2020-07-20 ENCOUNTER — HOSPITAL ENCOUNTER (EMERGENCY)
Age: 85
Discharge: LEFT BEFORE DISCHARGE | End: 2020-07-20
Payer: MEDICARE

## 2020-07-20 VITALS
WEIGHT: 175 LBS | DIASTOLIC BLOOD PRESSURE: 82 MMHG | RESPIRATION RATE: 16 BRPM | TEMPERATURE: 99.3 F | OXYGEN SATURATION: 96 % | SYSTOLIC BLOOD PRESSURE: 135 MMHG | BODY MASS INDEX: 25.92 KG/M2 | HEART RATE: 103 BPM | HEIGHT: 69 IN

## 2020-08-12 VITALS
TEMPERATURE: 96.4 F | DIASTOLIC BLOOD PRESSURE: 70 MMHG | HEART RATE: 68 BPM | WEIGHT: 174 LBS | BODY MASS INDEX: 25.7 KG/M2 | RESPIRATION RATE: 12 BRPM | SYSTOLIC BLOOD PRESSURE: 120 MMHG

## 2020-08-12 RX ORDER — ASPIRIN 81 MG/1
81 TABLET ORAL DAILY
COMMUNITY
End: 2021-01-28

## 2020-11-10 ENCOUNTER — TELEPHONE (OUTPATIENT)
Dept: FAMILY MEDICINE CLINIC | Age: 85
End: 2020-11-10

## 2020-11-11 ENCOUNTER — TELEPHONE (OUTPATIENT)
Dept: ADMINISTRATIVE | Age: 85
End: 2020-11-11

## 2020-11-11 NOTE — TELEPHONE ENCOUNTER
Patient is waiting on a call back about changing his appt tomorrow. Please contact and advise. Thank you.

## 2020-11-12 NOTE — TELEPHONE ENCOUNTER
Patient calling says his current appt is at 3:45 today. He was called asking to change it, which he has done, but still unable to make contact to the office. I called Hunter Rivera and put him through to Mookie to RS his appt.

## 2020-11-13 RX ORDER — PANTOPRAZOLE SODIUM 40 MG/1
40 TABLET, DELAYED RELEASE ORAL DAILY
Qty: 90 TABLET | Refills: 0 | Status: SHIPPED | OUTPATIENT
Start: 2020-11-13 | End: 2021-01-28 | Stop reason: SDUPTHER

## 2020-11-30 ENCOUNTER — NURSE ONLY (OUTPATIENT)
Dept: FAMILY MEDICINE CLINIC | Age: 85
End: 2020-11-30
Payer: MEDICARE

## 2020-11-30 PROCEDURE — 90694 VACC AIIV4 NO PRSRV 0.5ML IM: CPT | Performed by: INTERNAL MEDICINE

## 2020-11-30 PROCEDURE — G0008 ADMIN INFLUENZA VIRUS VAC: HCPCS | Performed by: INTERNAL MEDICINE

## 2021-01-28 ENCOUNTER — OFFICE VISIT (OUTPATIENT)
Dept: FAMILY MEDICINE CLINIC | Age: 86
End: 2021-01-28
Payer: MEDICARE

## 2021-01-28 VITALS
HEIGHT: 68 IN | OXYGEN SATURATION: 93 % | TEMPERATURE: 97.7 F | SYSTOLIC BLOOD PRESSURE: 124 MMHG | DIASTOLIC BLOOD PRESSURE: 80 MMHG | WEIGHT: 180 LBS | BODY MASS INDEX: 27.28 KG/M2 | HEART RATE: 50 BPM

## 2021-01-28 DIAGNOSIS — E78.5 HYPERLIPIDEMIA, UNSPECIFIED HYPERLIPIDEMIA TYPE: ICD-10-CM

## 2021-01-28 DIAGNOSIS — E03.9 HYPOTHYROIDISM, UNSPECIFIED TYPE: ICD-10-CM

## 2021-01-28 DIAGNOSIS — R41.89 COGNITIVE IMPAIRMENT: ICD-10-CM

## 2021-01-28 DIAGNOSIS — R53.83 OTHER FATIGUE: ICD-10-CM

## 2021-01-28 DIAGNOSIS — F41.1 GENERALIZED ANXIETY DISORDER: Primary | ICD-10-CM

## 2021-01-28 DIAGNOSIS — R73.9 HYPERGLYCEMIA: ICD-10-CM

## 2021-01-28 DIAGNOSIS — Z12.5 PROSTATE CANCER SCREENING: ICD-10-CM

## 2021-01-28 DIAGNOSIS — I10 ESSENTIAL HYPERTENSION: ICD-10-CM

## 2021-01-28 PROCEDURE — 99213 OFFICE O/P EST LOW 20 MIN: CPT | Performed by: INTERNAL MEDICINE

## 2021-01-28 RX ORDER — ALPRAZOLAM 0.5 MG/1
0.5 TABLET ORAL DAILY
Qty: 30 TABLET | Refills: 0 | Status: SHIPPED | OUTPATIENT
Start: 2021-01-28 | End: 2021-02-27

## 2021-01-28 RX ORDER — PANTOPRAZOLE SODIUM 40 MG/1
40 TABLET, DELAYED RELEASE ORAL DAILY
Qty: 90 TABLET | Refills: 1 | Status: SHIPPED | OUTPATIENT
Start: 2021-01-28 | End: 2022-01-07 | Stop reason: SDUPTHER

## 2021-01-28 RX ORDER — LEVOTHYROXINE SODIUM 0.03 MG/1
25 TABLET ORAL DAILY
Qty: 90 TABLET | Refills: 1 | Status: SHIPPED | OUTPATIENT
Start: 2021-01-28 | End: 2021-03-02 | Stop reason: SDUPTHER

## 2021-01-28 RX ORDER — CITALOPRAM 20 MG/1
20 TABLET ORAL DAILY
Qty: 90 TABLET | Refills: 1 | Status: SHIPPED | OUTPATIENT
Start: 2021-01-28 | End: 2021-03-02 | Stop reason: SDUPTHER

## 2021-01-28 RX ORDER — TRIAMTERENE AND HYDROCHLOROTHIAZIDE 37.5; 25 MG/1; MG/1
0.5 TABLET ORAL DAILY
COMMUNITY
End: 2021-12-01 | Stop reason: SDUPTHER

## 2021-01-28 ASSESSMENT — PATIENT HEALTH QUESTIONNAIRE - PHQ9
SUM OF ALL RESPONSES TO PHQ QUESTIONS 1-9: 0
SUM OF ALL RESPONSES TO PHQ9 QUESTIONS 1 & 2: 0
SUM OF ALL RESPONSES TO PHQ QUESTIONS 1-9: 0

## 2021-01-28 NOTE — PROGRESS NOTES
Subjective:     Chief Complaint   Patient presents with    Hypertension    Other   Complains of pain in the knees the joints  Pain in hands  Follow-up on hypertension  Needs refill of medication  Has a rash on the back itching a lot seeing a dermatologist had a biopsy taking prednisone 20 mg every other day  Still not helping much  Feels tired occasionally  Sometimes has problem with the names  Denies any chest pain or palpitation  Denies anxiety gets nervous lives alone    Her dizziness has resolved    Denies any blood in the stool had a colonoscopy September 2018 by Dr. Leonela Main    Past Medical History:   Diagnosis Date    Arthritis     Asthma     COPD (chronic obstructive pulmonary disease) ([facility] [address]     Depression     Hiatal hernia     Hx of solitary pulmonary nodule     Hyperglycemia     Hyperlipidemia     Hypertension     Macular degeneration     Neuropathy     Osteoarthritis     Peptic ulcer     PONV (postoperative nausea and vomiting)     Thyroid disease         Social History     Socioeconomic History    Marital status:      Spouse name: Not on file    Number of children: Not on file    Years of education: Not on file    Highest education level: Not on file   Occupational History    Not on file   Social Needs    Financial resource strain: Not on file    Food insecurity     Worry: Not on file     Inability: Not on file   [employer] needs     Medical: Not on file     Non-medical: Not on file   Tobacco Use    Smoking status: Never Smoker    Smokeless tobacco: Never Used   Substance and Sexual Activity    Alcohol use: Yes     Comment: on occasion    Drug use: Never    Sexual activity: Not on file   Lifestyle    Physical activity     Days per week: Not on file     Minutes per session: Not on file    Stress: Not on file   Relationships    Social connections     Talks on phone: Not on file     Gets together: Not on file     Attends Hinduism service: Not on file     Active member of club or organization: Not on file     Attends meetings of clubs or organizations: Not on file     Relationship status: Not on file    Intimate partner violence     Fear of current or ex partner: Not on file     Emotionally abused: Not on file     Physically abused: Not on file     Forced sexual activity: Not on file   Other Topics Concern    Not on file   Social History Narrative    Not on file        Past Surgical History:   Procedure Laterality Date    BACK SURGERY      1989    COLONOSCOPY      ENDOSCOPY, COLON, DIAGNOSTIC      EYE SURGERY      viviane cataracts   [address] [facility]      x2  1955, [facility] Right 3/9/2020    [address] XI performed by Marcela Berry MD at [facility] [place] [facility]          Family History   Problem Relation Age of Onset    Cancer Mother         bladder     Lung Cancer Father     High Blood Pressure Sister     High Blood Pressure Brother     Diabetes Brother         Allergies   Allergen Reactions    Lipitor [Atorvastatin Calcium]      Myalgia       Pcn [Penicillins] Swelling    Prednisone Swelling     Swelling of ankles        ROS  No acute distress  Cardiac: Denies any chest pain or palpitation  Respiratory: Denies any cough or shortness of breath  GI: No abdominal pain. Denies any nausea vomiting or diarrhea  No blood in the stool  : Denies any dysuria frequency or hematuria  Neuro: No headache or dizziness  Endocrine: No diabetes  Skin: normal  No recent weight gain or weight loss  Denies any change in vision    Objective:    /80   Pulse 50   Temp 97.7 °F (36.5 °C)   Ht 5' 8\" (1.727 m)   Wt 180 lb (81.6 kg)   SpO2 93%   BMI 27.37 kg/m²     Constitutional: Alert awake and oriented  Eyes: Pupils equal bilaterally. Extraocular muscles intact  Neck: no JVD adenopathy no bruit  Heart:  RRR, no murmurs, gallops, or rubs.   Lungs:    no wheeze, rales or rhonchi  Abd: bowel sounds present, nontender, nondistended, no masses  Extrem:  No clubbing, cyanosis, or edema  Neuro: AAOx3,No Focal deficit  Psychological: Has general anxiety takes Xanax very rarely      Current Outpatient Medications   Medication Sig Dispense Refill    citalopram (CELEXA) 20 MG tablet Take 1 tablet by mouth daily 90 tablet 1    pantoprazole (PROTONIX) 40 MG tablet Take 1 tablet by mouth daily 90 tablet 1    levothyroxine (SYNTHROID) 25 MCG tablet Take 1 tablet by mouth Daily 90 tablet 1    ALPRAZolam (XANAX) 0.5 MG tablet Take 1 tablet by mouth daily for 30 days. 30 tablet 0    triamterene-hydroCHLOROthiazide (MAXZIDE-25) 37.5-25 MG per tablet Take 0.5 tablets by mouth daily      vitamin C (ASCORBIC ACID) 500 MG tablet Take 500 mg by mouth daily      Multiple Vitamins-Minerals (PRESERVISION AREDS 2+MULTI VIT PO) Take 1 tablet by mouth 2 times daily       fluticasone (FLOVENT HFA) 110 MCG/ACT inhaler Inhale 2 puffs into the lungs 2 times daily       lisinopril (PRINIVIL;ZESTRIL) 10 MG tablet Take 10 mg by mouth daily       multivitamin-iron-minerals-folic acid (CENTRUM) chewable tablet Take 1 tablet by mouth daily      tamsulosin (FLOMAX) 0.4 MG capsule Take 1 capsule by mouth daily for 5 days 5 capsule 0     No current facility-administered medications for this visit.          Last 3 BMP  Lab Results   Component Value Date/Time     07/01/2020 04:22 AM     06/30/2020 03:10 PM     05/21/2020 11:29 AM    K 4.2 07/01/2020 04:22 AM    K 4.4 06/30/2020 03:10 PM    K 4.4 05/21/2020 11:29 AM    K 4.4 03/04/2020 02:05 PM     07/01/2020 04:22 AM     06/30/2020 03:10 PM     05/21/2020 11:29 AM    CO2 25 07/01/2020 04:22 AM    CO2 28 06/30/2020 03:10 PM    CO2 26 05/21/2020 11:29 AM    BUN 24 (H) 07/01/2020 04:22 AM    BUN 26 (H) 06/30/2020 03:10 PM    BUN 23 05/21/2020 11:29 AM    CREATININE 1.0 07/01/2020 04:22 AM    CREATININE 1.1 06/30/2020 03:10 PM    CREATININE 1.1 05/21/2020 11:29 AM GLUCOSE 107 (H) 07/01/2020 04:22 AM    GLUCOSE 123 (H) 06/30/2020 03:10 PM    GLUCOSE 112 (H) 03/04/2020 02:05 PM    CALCIUM 8.8 07/01/2020 04:22 AM    CALCIUM 9.6 06/30/2020 03:10 PM    CALCIUM 9.4 05/21/2020 11:29 AM       Last 3 CMP:    Lab Results   Component Value Date/Time     07/01/2020 04:22 AM     06/30/2020 03:10 PM     05/21/2020 11:29 AM    K 4.2 07/01/2020 04:22 AM    K 4.4 06/30/2020 03:10 PM    K 4.4 05/21/2020 11:29 AM    K 4.4 03/04/2020 02:05 PM     07/01/2020 04:22 AM     06/30/2020 03:10 PM     05/21/2020 11:29 AM    CO2 25 07/01/2020 04:22 AM    CO2 28 06/30/2020 03:10 PM    CO2 26 05/21/2020 11:29 AM    BUN 24 (H) 07/01/2020 04:22 AM    BUN 26 (H) 06/30/2020 03:10 PM    BUN 23 05/21/2020 11:29 AM    CREATININE 1.0 07/01/2020 04:22 AM    CREATININE 1.1 06/30/2020 03:10 PM    CREATININE 1.1 05/21/2020 11:29 AM    GLUCOSE 107 (H) 07/01/2020 04:22 AM    GLUCOSE 123 (H) 06/30/2020 03:10 PM    GLUCOSE 112 (H) 03/04/2020 02:05 PM    CALCIUM 8.8 07/01/2020 04:22 AM    CALCIUM 9.6 06/30/2020 03:10 PM    CALCIUM 9.4 05/21/2020 11:29 AM    PROT 5.5 (L) 07/01/2020 04:22 AM    PROT 6.8 06/30/2020 03:10 PM    PROT 7.0 05/21/2020 11:29 AM    LABALBU 3.3 (L) 07/01/2020 04:22 AM    LABALBU 4.0 06/30/2020 03:10 PM    LABALBU 4.3 05/21/2020 11:29 AM    BILITOT 0.5 07/01/2020 04:22 AM    BILITOT 0.5 06/30/2020 03:10 PM    BILITOT 0.5 05/21/2020 11:29 AM    ALKPHOS 53 07/01/2020 04:22 AM    ALKPHOS 65 06/30/2020 03:10 PM    ALKPHOS 73 05/21/2020 11:29 AM    AST 19 07/01/2020 04:22 AM    AST 25 06/30/2020 03:10 PM    AST 22 05/21/2020 11:29 AM    ALT 18 07/01/2020 04:22 AM    ALT 22 06/30/2020 03:10 PM    ALT 16 05/21/2020 11:29 AM        CBC:   Lab Results   Component Value Date/Time    WBC 6.8 06/30/2020 03:10 PM    RBC 4.26 06/30/2020 03:10 PM    HGB 14.0 06/30/2020 03:10 PM    HCT 43.4 06/30/2020 03:10 PM    .9 (H) 06/30/2020 03:10 PM    MCH 32.9 06/30/2020 03:10 PM MCHC 32.3 06/30/2020 03:10 PM    RDW 13.8 06/30/2020 03:10 PM     06/30/2020 03:10 PM    MPV 9.8 06/30/2020 03:10 PM       A1C:  Lab Results   Component Value Date/Time    LABA1C 6.1 (H) 05/21/2020 11:29 AM       Lipid panel:  Lab Results   Component Value Date    CHOL 187 07/01/2020    CHOL 251 07/20/2016    CHOL 235 06/15/2015    TRIG 87 07/01/2020    TRIG 97 07/20/2016    TRIG 164 06/15/2015    HDL 47 07/01/2020    HDL 51 05/21/2020    HDL 59 07/20/2016        Lab Results   Component Value Date/Time    PROT 5.5 (L) 07/01/2020 04:22 AM    PROT 6.8 06/30/2020 03:10 PM    PROT 7.0 05/21/2020 11:29 AM       No results found for: MG      Assessment. Thelma Linares was seen today for hypertension and other. Diagnoses and all orders for this visit:    Generalized anxiety disorder  -     ALPRAZolam (XANAX) 0.5 MG tablet; Take 1 tablet by mouth daily for 30 days. Essential hypertension    Hyperlipidemia, unspecified hyperlipidemia type  -     Comprehensive Metabolic Panel; Future  -     Lipid Panel; Future    Hypothyroidism, unspecified type  -     T4; Future  -     TSH without Reflex; Future    Hyperglycemia  -     Hemoglobin A1C; Future    Prostate cancer screening  -     Psa screening; Future    Other fatigue  -     CBC Auto Differential; Future    Cognitive impairment  -     VITAMIN B12 & FOLATE; Future    Other orders  -     citalopram (CELEXA) 20 MG tablet; Take 1 tablet by mouth daily  -     pantoprazole (PROTONIX) 40 MG tablet; Take 1 tablet by mouth daily  -     levothyroxine (SYNTHROID) 25 MCG tablet;  Take 1 tablet by mouth Daily       Patient Active Problem List   Diagnosis    Right inguinal hernia    Dizziness       Plan: Take Advil as needed or Tylenol arthritis as needed    Check complete blood work CBC, CMP, lipid and call for report    Medication refills given    Follow with rheumatologist for the rash in the back appears to be eczema he is on high-dose of prednisone  Risks were discussed with the patient    History of depression doing very well denies any suicidal ideation    Has history of lung nodule follow with the pulmonologist in [place]    Return in about 4 months (around 5/28/2021).        Zainab Whitney MD  5:31 PM  1/28/2021     DE

## 2021-02-24 ENCOUNTER — HOSPITAL ENCOUNTER (EMERGENCY)
Age: 86
Discharge: HOME | End: 2021-02-24
Payer: MEDICARE

## 2021-02-24 VITALS
HEIGHT: 69 IN | RESPIRATION RATE: 18 BRPM | DIASTOLIC BLOOD PRESSURE: 80 MMHG | HEART RATE: 90 BPM | BODY MASS INDEX: 26.66 KG/M2 | WEIGHT: 180 LBS | OXYGEN SATURATION: 98 % | SYSTOLIC BLOOD PRESSURE: 160 MMHG | TEMPERATURE: 98.6 F

## 2021-02-24 DIAGNOSIS — K64.9 HEMORRHOIDS, UNSPECIFIED HEMORRHOID TYPE: Primary | ICD-10-CM

## 2021-02-24 PROCEDURE — 99212 OFFICE O/P EST SF 10 MIN: CPT

## 2021-02-24 RX ORDER — HYDROCORTISONE ACETATE 25 MG/1
25 SUPPOSITORY RECTAL DAILY
Qty: 14 SUPPOSITORY | Refills: 0 | Status: SHIPPED | OUTPATIENT
Start: 2021-02-24 | End: 2021-03-10

## 2021-02-24 ASSESSMENT — PAIN - FUNCTIONAL ASSESSMENT: PAIN_FUNCTIONAL_ASSESSMENT: 0-10

## 2021-02-24 ASSESSMENT — PAIN SCALES - GENERAL: PAINLEVEL_OUTOF10: 5

## 2021-02-24 ASSESSMENT — PAIN DESCRIPTION - PAIN TYPE
TYPE: ACUTE PAIN
TYPE: ACUTE PAIN

## 2021-02-24 ASSESSMENT — PAIN DESCRIPTION - PROGRESSION: CLINICAL_PROGRESSION: NOT CHANGED

## 2021-02-24 ASSESSMENT — PAIN DESCRIPTION - FREQUENCY: FREQUENCY: CONTINUOUS

## 2021-02-24 ASSESSMENT — PAIN DESCRIPTION - LOCATION: LOCATION: RECTUM

## 2021-02-24 ASSESSMENT — PAIN DESCRIPTION - DESCRIPTORS: DESCRIPTORS: OTHER (COMMENT)

## 2021-02-24 NOTE — ED PROVIDER NOTES
Department of Emergency Medicine  [address] [place] [address]  Provider Note  Admit Date/Time: 2021  5:13 PM  Room:   NAME: Johnathan Avila  : 1935  MRN: [id number]     Chief Complaint:  Hemorrhoids (Pt states \"They are bleeding & I have some liquid coming out of My rectum today so I've just felt miserable\" )    History of Present Illness        Johnathan Avila is a 80 y.o. male who has a past medical history of:   Past Medical History:   Diagnosis Date    Arthritis     Asthma     COPD (chronic obstructive pulmonary disease) ([address]     Depression     Hiatal hernia     Hx of solitary pulmonary nodule     Hyperglycemia     Hyperlipidemia     Hypertension     Macular degeneration     Neuropathy     Osteoarthritis     Peptic ulcer     PONV (postoperative nausea and vomiting)     Thyroid disease     presents to the urgent care center by private car for a  painful hemorrhoid that  is bleeding when he wipes. He has had trouble with this before. He used to use Anusol suppositories and they worked well however he said his insurance did not cover them and so he was not able to get them he is has some type of cream that his doctor ordered that he says the cream causes burning and so he cannot use that. He denies any abdominal pain or other complaints states he does not have a fever. Not complaining of constipation. ROS    Pertinent positives and negatives are stated within HPI, all other systems reviewed and are negative. Past Surgical History:   Procedure Laterality Date    BACK SURGERY          COLONOSCOPY      ENDOSCOPY, COLON, DIAGNOSTIC      EYE SURGERY      viviane cataracts    HERNIA REPAIR      x2  , 1973    HERNIA REPAIR Right 3/9/2020    LAPAROSCOPIC HERNIA RIGHT  INGUINAL REPAIR WITH MESH ROBOTIC XI performed by Jackelyn Etienne MD at [address] History:  reports that he has never smoked.  He has never used smokeless tobacco. He reports current alcohol use. He reports that he does not use drugs. Family History: family history includes Cancer in his mother; Diabetes in his brother; High Blood Pressure in his brother and sister; Siddhartha Christina in his father. Allergies: Lipitor [atorvastatin calcium], Pcn [penicillins], and Prednisone    Physical Exam   Oxygen Saturation Interpretation: Normal.   ED Triage Vitals [02/24/21 1720]   BP Temp Temp Source Pulse Resp SpO2 Height Weight   (!) 160/80 98.6 °F (37 °C) Temporal 90 18 98 % 5' 9\" (1.753 m) 180 lb (81.6 kg)       Physical Exam  · Constitutional/General: Alert and oriented x3, well appearing, non toxic in NAD  · HEENT:  NC/NT. Clear conjunctiva,  Airway patent. · Neck: Supple, full ROM  · Respiratory:  Not in respiratory distress  · CV:  Regular rate. Regular rhythm. · GI:  Abdomen Soft, Non tender, he does have one large hemorrhoid in  the rectal opening that was visible at the 7 oclock position. .  There is no active bleeding noted no discharge noted. · Musculoskeletal: Moves all extremities x 4. Warm and well perfused, no clubbing, cyanosis, or edema. Capillary refill <3 seconds  · Integument: skin warm and dry. No rashes. · Lymphatic: no lymphadenopathy noted  · Neurologic: GCS 15, no focal deficits  · Psychiatric: Normal Affect    Lab / Imaging Results   (All laboratory and radiology results have been personally reviewed by myself)  Labs:  No results found for this visit on 02/24/21. Imaging: All Radiology results interpreted by Radiologist unless otherwise noted. No orders to display       ED Course / Medical Decision Making   Medications - No data to display       MDM:   Patient here with hemorrhoids this is a longstanding problem for him. He said he has been told to soak in tub of warm  water but he has trouble getting up out of the bathtub he has been ordered a cream but he said the cream causes burning so he cannot use that . He has seen surgery.   He said Anusol suppositories worked well but they were not covered by insurance but he wants to try those again since they were the only thing that seemed to work . I  did order him some Anusol suppositories and advised him to follow-up with his surgeon for further evaluation of this    If he has any bleeding without wiping or just bleeding  that starts he needs to go directly to the ED. Assessment      1. Hemorrhoids, unspecified hemorrhoid type      Plan   Discharge to home and advised to contact Gwen Early MD  [address] [facility]  [phone]      As needed    Kaleb Low MD  [facility] [address] [place] [id number] [phone]    Schedule an appointment as soon as possible for a visit      Patient condition is good    New Medications     New Prescriptions    HYDROCORTISONE (ANUSOL-HC) 25 MG SUPPOSITORY    Place 1 suppository rectally daily for 14 days     Electronically signed by KAT Dia CNP   DD: 2/24/21  **This report was transcribed using voice recognition software. Every effort was made to ensure accuracy; however, inadvertent computerized transcription errors may be present.   END OF ED PROVIDER NOTE     KAT Dia CNP  02/24/21 [id number]

## 2021-02-24 NOTE — PROGRESS NOTES
Called patient twice he did not  the phone left a message he called in perfect serve blood in the stool advised to go to ER

## 2021-02-26 ENCOUNTER — TELEPHONE (OUTPATIENT)
Dept: SURGERY | Age: 86
End: 2021-02-26

## 2021-02-26 NOTE — TELEPHONE ENCOUNTER
Incoming referral received following recent ER visit for Hemorrhoids. Attempted to reach patient to schedule with Dr. Blaze Adams, VM left with call back information.   Electronically signed by Garrett Mendes RN on 2/26/2021 at 2:50 PM

## 2021-03-02 NOTE — TELEPHONE ENCOUNTER
Pt called in saying he threw away his Rx's for citalopram 20 mg and levothyroxine 25 mcg that were written on 1.28.21.

## 2021-03-03 RX ORDER — LEVOTHYROXINE SODIUM 0.03 MG/1
25 TABLET ORAL DAILY
Qty: 90 TABLET | Refills: 1 | Status: SHIPPED | OUTPATIENT
Start: 2021-03-03 | End: 2021-09-02 | Stop reason: SDUPTHER

## 2021-03-03 RX ORDER — CITALOPRAM 20 MG/1
20 TABLET ORAL DAILY
Qty: 90 TABLET | Refills: 1 | Status: SHIPPED | OUTPATIENT
Start: 2021-03-03 | End: 2021-03-11 | Stop reason: SDUPTHER

## 2021-03-10 NOTE — TELEPHONE ENCOUNTER
Pt called saying he still doesn't have his Rx for citalopram.  Pt asking that it be sent to [address] [facility] [address])

## 2021-03-11 RX ORDER — CITALOPRAM 20 MG/1
20 TABLET ORAL DAILY
Qty: 90 TABLET | Refills: 1 | Status: SHIPPED
Start: 2021-03-11 | End: 2021-09-08

## 2021-03-15 ENCOUNTER — TELEPHONE (OUTPATIENT)
Dept: ADMINISTRATIVE | Age: 86
End: 2021-03-15

## 2021-03-23 ENCOUNTER — OFFICE VISIT (OUTPATIENT)
Dept: FAMILY MEDICINE CLINIC | Age: 86
End: 2021-03-23
Payer: MEDICARE

## 2021-03-23 VITALS
WEIGHT: 175 LBS | BODY MASS INDEX: 25.84 KG/M2 | TEMPERATURE: 97.7 F | DIASTOLIC BLOOD PRESSURE: 80 MMHG | OXYGEN SATURATION: 93 % | SYSTOLIC BLOOD PRESSURE: 134 MMHG | HEART RATE: 83 BPM

## 2021-03-23 DIAGNOSIS — K21.9 GASTROESOPHAGEAL REFLUX DISEASE WITHOUT ESOPHAGITIS: ICD-10-CM

## 2021-03-23 DIAGNOSIS — F32.A DEPRESSION, UNSPECIFIED DEPRESSION TYPE: ICD-10-CM

## 2021-03-23 DIAGNOSIS — K64.9 HEMORRHOIDS, UNSPECIFIED HEMORRHOID TYPE: ICD-10-CM

## 2021-03-23 DIAGNOSIS — I10 ESSENTIAL HYPERTENSION: Primary | ICD-10-CM

## 2021-03-23 DIAGNOSIS — L50.9 URTICARIA: ICD-10-CM

## 2021-03-23 DIAGNOSIS — M70.22 OLECRANON BURSITIS OF LEFT ELBOW: ICD-10-CM

## 2021-03-23 PROCEDURE — 99213 OFFICE O/P EST LOW 20 MIN: CPT | Performed by: INTERNAL MEDICINE

## 2021-03-23 RX ORDER — LISINOPRIL 10 MG/1
10 TABLET ORAL DAILY
Qty: 90 TABLET | Refills: 1 | Status: SHIPPED
Start: 2021-03-23 | End: 2021-09-23 | Stop reason: SDUPTHER

## 2021-03-23 RX ORDER — DOXYCYCLINE HYCLATE 100 MG/1
CAPSULE ORAL
COMMUNITY
Start: 2021-01-22 | End: 2021-08-19

## 2021-03-23 RX ORDER — HYDROCORTISONE ACETATE 25 MG/1
25 SUPPOSITORY RECTAL EVERY 12 HOURS
Qty: 20 SUPPOSITORY | Refills: 1 | Status: SHIPPED | OUTPATIENT
Start: 2021-03-23 | End: 2022-05-06

## 2021-03-23 RX ORDER — KETOCONAZOLE 20 MG/G
CREAM TOPICAL
COMMUNITY
Start: 2021-03-21 | End: 2022-01-07

## 2021-03-23 RX ORDER — INFLUENZA A VIRUS A/MICHIGAN/45/2015 X-275 (H1N1) ANTIGEN (FORMALDEHYDE INACTIVATED), INFLUENZA A VIRUS A/SINGAPORE/INFIMH-16-0019/2016 IVR-186 (H3N2) ANTIGEN (FORMALDEHYDE INACTIVATED), AND INFLUENZA B VIRUS B/MARYLAND/15/2016 BX-69A (A B/COLORADO/6/2017-LIKE VIRUS) ANTIGEN (FORMALDEHYDE INACTIVATED) 60; 60; 60 UG/.5ML; UG/.5ML; UG/.5ML
INJECTION, SUSPENSION INTRAMUSCULAR
COMMUNITY
End: 2022-03-11

## 2021-03-23 NOTE — PROGRESS NOTES
Subjective:     Chief Complaint   Patient presents with    Rash     ITCHING    Joint Swelling    Hemorrhoids   Complains of itching all over the body  He has been followed by Dr. Elmer Dickinson he is on high-dose prednisone 10 mg twice a day  And local cortisone ointment  Plan ketoconazole 2% cream  Still has itching all over excoriation of the skin    He had some hemorrhoid flareup he went to urgent care given Anusol HC suppository doing better  Had a colonoscopy in 2018 with Dr. Susie Whelan depressed was on Celexa before    Complains of swelling left elbow    Past Medical History:   Diagnosis Date    Arthritis     Asthma     COPD [facility]chronic obstructive pulmonary disease) ([facility] [address]     Depression     Hiatal hernia     Hx of solitary pulmonary nodule     Hyperglycemia     Hyperlipidemia     Hypertension     Macular degeneration     Neuropathy     Osteoarthritis     Peptic ulcer     PONV (postoperative nausea and vomiting)     Thyroid disease         Social History     Socioeconomic History    Marital status:      Spouse name: Not on file    Number of children: Not on file    Years of education: Not on file    Highest education level: Not on file   Occupational History    Not on file   Social Needs    Financial resource strain: Not on file    Food insecurity     Worry: Not on file     Inability: Not on file   [employer] needs     Medical: Not on file     Non-medical: Not on file   Tobacco Use    Smoking status: Never Smoker    Smokeless tobacco: Never Used   Substance and Sexual Activity    Alcohol use: Yes     Comment: on occasion    Drug use: Never    Sexual activity: Not Currently   Lifestyle    Physical activity     Days per week: Not on file     Minutes per session: Not on file    Stress: Not on file   Relationships    Social connections     Talks on phone: Not on file     Gets together: Not on file     Attends Congregation service: Not on file     Active member of club or organization: Not on file     Attends meetings of clubs or organizations: Not on file     Relationship status: Not on file    Intimate partner violence     Fear of current or ex partner: Not on file     Emotionally abused: Not on file     Physically abused: Not on file     Forced sexual activity: Not on file   Other Topics Concern    Not on file   Social History Narrative    Not on file        Past Surgical History:   Procedure Laterality Date    BACK SURGERY      1989    COLONOSCOPY      ENDOSCOPY, COLON, DIAGNOSTIC      EYE SURGERY      viviane cataracts   [phone]      [facility]  [facility]    [address] [facility] performed by Ramon Wills MD at [facility]          Family History   Problem Relation Age of Onset    Cancer Mother         bladder     Lung Cancer Father     High Blood Pressure Sister     High Blood Pressure Brother     Diabetes Brother         Allergies   Allergen Reactions    Lipitor [Atorvastatin Calcium]      Myalgia       Pcn [Penicillins] Swelling    Prednisone Swelling     Swelling of ankles        ROS  No acute distress  Cardiac: Denies any chest pain or palpitation  No angina no dyspnea  Respiratory: Denies any cough or shortness of breath    GI: No abdominal pain. Denies any nausea vomiting or diarrhea  Denies any blood in the stool  Colonoscopy 2018 with Dr. Hu Pennington  : Denies any dysuria frequency or hematuria  Neuro: No headache or dizziness  Endocrine: No diabetes  Skin: normal  No recent weight gain or weight loss  Denies any change in vision    Objective:    /80   Pulse 83   Temp 97.7 °F (36.5 °C)   Wt 175 lb (79.4 kg)   SpO2 93%   BMI 25.84 kg/m²     Constitutional: Alert awake and oriented  Eyes: Pupils equal bilaterally. Extraocular muscles intact  Neck: no JVD adenopathy no bruit  Heart:  RRR, no murmurs, gallops, or rubs.   Lungs:    no wheeze, rales or rhonchi  Abd: bowel sounds present, nontender, nondistended, no masses  Extrem:  No clubbing, cyanosis, or edema  Neuro: AAOx3,No Focal deficit  Psychological: no depression or anxiety   Skin generalized excoriation  Itching all over  Left elbow bursitis  Non  tender    Current Outpatient Medications   Medication Sig Dispense Refill    Influenza Vac Split High-Dose (FLUZONE HIGH-DOSE) 0.5 ML FAHAD injection Fluzone High-Dose 2019-20 (PF) 180 mcg/0.5 mL intramuscular syringe      ketoconazole (NIZORAL) 2 % cream       doxycycline hyclate (VIBRAMYCIN) 100 MG capsule       lisinopril (PRINIVIL;ZESTRIL) 10 MG tablet Take 1 tablet by mouth daily 90 tablet 1    hydrocortisone (ANUSOL-HC) 25 MG suppository Place 1 suppository rectally every 12 hours 20 suppository 1    citalopram (CELEXA) 20 MG tablet Take 1 tablet by mouth daily 90 tablet 1    levothyroxine (SYNTHROID) 25 MCG tablet Take 1 tablet by mouth Daily 90 tablet 1    pantoprazole (PROTONIX) 40 MG tablet Take 1 tablet by mouth daily 90 tablet 1    triamterene-hydroCHLOROthiazide (MAXZIDE-25) 37.5-25 MG per tablet Take 0.5 tablets by mouth daily      vitamin C (ASCORBIC ACID) 500 MG tablet Take 500 mg by mouth daily      Multiple Vitamins-Minerals (PRESERVISION AREDS 2+MULTI VIT PO) Take 1 tablet by mouth 2 times daily       fluticasone (FLOVENT HFA) 110 MCG/ACT inhaler Inhale 2 puffs into the lungs 2 times daily       multivitamin-iron-minerals-folic acid (CENTRUM) chewable tablet Take 1 tablet by mouth daily      tamsulosin (FLOMAX) 0.4 MG capsule Take 1 capsule by mouth daily for 5 days 5 capsule 0     No current facility-administered medications for this visit.          Last 3 BMP  Lab Results   Component Value Date/Time     07/01/2020 04:22 AM     06/30/2020 03:10 PM     05/21/2020 11:29 AM    K 4.2 07/01/2020 04:22 AM    K 4.4 06/30/2020 03:10 PM    K 4.4 05/21/2020 11:29 AM    K 4.4 03/04/2020 02:05 PM     07/01/2020 04:22 AM     06/30/2020 03:10 PM     05/21/2020 11:29 AM    CO2 25 07/01/2020 04:22 AM    CO2 28 06/30/2020 03:10 PM    CO2 26 05/21/2020 11:29 AM    BUN 24 (H) 07/01/2020 04:22 AM    BUN 26 (H) 06/30/2020 03:10 PM    BUN 23 05/21/2020 11:29 AM    CREATININE 1.0 07/01/2020 04:22 AM    CREATININE 1.1 06/30/2020 03:10 PM    CREATININE 1.1 05/21/2020 11:29 AM    GLUCOSE 107 (H) 07/01/2020 04:22 AM    GLUCOSE 123 (H) 06/30/2020 03:10 PM    GLUCOSE 112 (H) 03/04/2020 02:05 PM    CALCIUM 8.8 07/01/2020 04:22 AM    CALCIUM 9.6 06/30/2020 03:10 PM    CALCIUM 9.4 05/21/2020 11:29 AM       Last 3 CMP:    Lab Results   Component Value Date/Time     07/01/2020 04:22 AM     06/30/2020 03:10 PM     05/21/2020 11:29 AM    K 4.2 07/01/2020 04:22 AM    K 4.4 06/30/2020 03:10 PM    K 4.4 05/21/2020 11:29 AM    K 4.4 03/04/2020 02:05 PM     07/01/2020 04:22 AM     06/30/2020 03:10 PM     05/21/2020 11:29 AM    CO2 25 07/01/2020 04:22 AM    CO2 28 06/30/2020 03:10 PM    CO2 26 05/21/2020 11:29 AM    BUN 24 (H) 07/01/2020 04:22 AM    BUN 26 (H) 06/30/2020 03:10 PM    BUN 23 05/21/2020 11:29 AM    CREATININE 1.0 07/01/2020 04:22 AM    CREATININE 1.1 06/30/2020 03:10 PM    CREATININE 1.1 05/21/2020 11:29 AM    GLUCOSE 107 (H) 07/01/2020 04:22 AM    GLUCOSE 123 (H) 06/30/2020 03:10 PM    GLUCOSE 112 (H) 03/04/2020 02:05 PM    CALCIUM 8.8 07/01/2020 04:22 AM    CALCIUM 9.6 06/30/2020 03:10 PM    CALCIUM 9.4 05/21/2020 11:29 AM    PROT 5.5 (L) 07/01/2020 04:22 AM    PROT 6.8 06/30/2020 03:10 PM    PROT 7.0 05/21/2020 11:29 AM    LABALBU 3.3 (L) 07/01/2020 04:22 AM    LABALBU 4.0 06/30/2020 03:10 PM    LABALBU 4.3 05/21/2020 11:29 AM    BILITOT 0.5 07/01/2020 04:22 AM    BILITOT 0.5 06/30/2020 03:10 PM    BILITOT 0.5 05/21/2020 11:29 AM    ALKPHOS 53 07/01/2020 04:22 AM    ALKPHOS 65 06/30/2020 03:10 PM    ALKPHOS 73 05/21/2020 11:29 AM    AST 19 07/01/2020 04:22 AM    AST 25 06/30/2020 03:10 PM    AST 22 05/21/2020 11:29 AM    ALT 18 07/01/2020 04:22 AM    ALT 22 06/30/2020 03:10 PM    ALT 16 05/21/2020 11:29 AM        CBC:   Lab Results   Component Value Date/Time    WBC 6.8 06/30/2020 03:10 PM    RBC 4.26 06/30/2020 03:10 PM    HGB 14.0 06/30/2020 03:10 PM    HCT 43.4 06/30/2020 03:10 PM    .9 (H) 06/30/2020 03:10 PM    MCH 32.9 06/30/2020 03:10 PM    MCHC 32.3 06/30/2020 03:10 PM    RDW 13.8 06/30/2020 03:10 PM     06/30/2020 03:10 PM    MPV 9.8 06/30/2020 03:10 PM       A1C:  Lab Results   Component Value Date/Time    LABA1C 6.1 (H) 05/21/2020 11:29 AM       Lipid panel:  Lab Results   Component Value Date    CHOL 187 07/01/2020    CHOL 251 07/20/2016    CHOL 235 06/15/2015    TRIG 87 07/01/2020    TRIG 97 07/20/2016    TRIG 164 06/15/2015    HDL 47 07/01/2020    HDL 51 05/21/2020    HDL 59 07/20/2016        Lab Results   Component Value Date/Time    PROT 5.5 (L) 07/01/2020 04:22 AM    PROT 6.8 06/30/2020 03:10 PM    PROT 7.0 05/21/2020 11:29 AM       No results found for: MG      Assessment. Nancy Farah was seen today for rash, joint swelling and hemorrhoids. Diagnoses and all orders for this visit:    Essential hypertension    Urticaria  -     External Referral To Allergy    Depression, unspecified depression type    Hemorrhoids, unspecified hemorrhoid type    Gastroesophageal reflux disease without esophagitis    Olecranon bursitis of left elbow    Other orders  -     lisinopril (PRINIVIL;ZESTRIL) 10 MG tablet;  Take 1 tablet by mouth daily  -     hydrocortisone (ANUSOL-HC) 25 MG suppository; Place 1 suppository rectally every 12 hours       Patient Active Problem List   Diagnosis    Right inguinal hernia    Dizziness       Plan:   Hypertension controlled continue lisinopril 10 mg and Maxide 25    Resume Celexa 10 mg daily    Rash all over the body with itching etiology not clear has tried prednisone 10 mg twice daily, triamcinolone 0.1% cream ketoconazole 2% cream not helping  Refer to allergist dr Jake Pinedo    He has olecranon bursitis left elbow he can wait till he can see an orthopedic he will wait on that    [facility]. Suppository for hemorrhoids    Patient was counseled    Check CBC CMP lipid A1c    Return in about 2 months (around 5/23/2021).        Tanner Marquez MD  5:46 PM  3/23/2021     [place]

## 2021-03-24 DIAGNOSIS — Z12.5 PROSTATE CANCER SCREENING: ICD-10-CM

## 2021-03-24 DIAGNOSIS — R41.89 COGNITIVE IMPAIRMENT: ICD-10-CM

## 2021-03-24 DIAGNOSIS — R53.83 OTHER FATIGUE: ICD-10-CM

## 2021-03-24 DIAGNOSIS — R73.9 HYPERGLYCEMIA: ICD-10-CM

## 2021-03-24 DIAGNOSIS — E78.5 HYPERLIPIDEMIA, UNSPECIFIED HYPERLIPIDEMIA TYPE: ICD-10-CM

## 2021-03-24 DIAGNOSIS — E03.9 HYPOTHYROIDISM, UNSPECIFIED TYPE: ICD-10-CM

## 2021-03-24 LAB
ALBUMIN SERPL-MCNC: 4.4 G/DL (ref 3.5–5.2)
ALP BLD-CCNC: 70 U/L (ref 40–129)
ALT SERPL-CCNC: 13 U/L (ref 0–40)
ANION GAP SERPL CALCULATED.3IONS-SCNC: 16 MMOL/L (ref 7–16)
AST SERPL-CCNC: 22 U/L (ref 0–39)
BASOPHILS ABSOLUTE: 0.04 E9/L (ref 0–0.2)
BASOPHILS RELATIVE PERCENT: 0.6 % (ref 0–2)
BILIRUB SERPL-MCNC: 0.6 MG/DL (ref 0–1.2)
BUN BLDV-MCNC: 24 MG/DL (ref 8–23)
CALCIUM SERPL-MCNC: 10.1 MG/DL (ref 8.6–10.2)
CHLORIDE BLD-SCNC: 102 MMOL/L (ref 98–107)
CHOLESTEROL, TOTAL: 236 MG/DL (ref 0–199)
CO2: 26 MMOL/L (ref 22–29)
CREAT SERPL-MCNC: 1.2 MG/DL (ref 0.7–1.2)
EOSINOPHILS ABSOLUTE: 0.05 E9/L (ref 0.05–0.5)
EOSINOPHILS RELATIVE PERCENT: 0.7 % (ref 0–6)
FOLATE: 15.7 NG/ML (ref 4.8–24.2)
GFR AFRICAN AMERICAN: >60
GFR NON-AFRICAN AMERICAN: 57 ML/MIN/1.73
GLUCOSE BLD-MCNC: 91 MG/DL (ref 74–99)
HBA1C MFR BLD: 5.9 % (ref 4–5.6)
HCT VFR BLD CALC: 43.7 % (ref 37–54)
HDLC SERPL-MCNC: 61 MG/DL
HEMOGLOBIN: 13.6 G/DL (ref 12.5–16.5)
IMMATURE GRANULOCYTES #: 0.03 E9/L
IMMATURE GRANULOCYTES %: 0.4 % (ref 0–5)
LDL CHOLESTEROL CALCULATED: 148 MG/DL (ref 0–99)
LYMPHOCYTES ABSOLUTE: 2.3 E9/L (ref 1.5–4)
LYMPHOCYTES RELATIVE PERCENT: 33.2 % (ref 20–42)
MCH RBC QN AUTO: 32.4 PG (ref 26–35)
MCHC RBC AUTO-ENTMCNC: 31.1 % (ref 32–34.5)
MCV RBC AUTO: 104 FL (ref 80–99.9)
MONOCYTES ABSOLUTE: 0.76 E9/L (ref 0.1–0.95)
MONOCYTES RELATIVE PERCENT: 11 % (ref 2–12)
NEUTROPHILS ABSOLUTE: 3.74 E9/L (ref 1.8–7.3)
NEUTROPHILS RELATIVE PERCENT: 54.1 % (ref 43–80)
PDW BLD-RTO: 13.1 FL (ref 11.5–15)
PLATELET # BLD: 262 E9/L (ref 130–450)
PMV BLD AUTO: 11.2 FL (ref 7–12)
POTASSIUM SERPL-SCNC: 4.3 MMOL/L (ref 3.5–5)
PROSTATE SPECIFIC ANTIGEN: 0.52 NG/ML (ref 0–4)
RBC # BLD: 4.2 E12/L (ref 3.8–5.8)
SODIUM BLD-SCNC: 144 MMOL/L (ref 132–146)
T4 TOTAL: 7.9 MCG/DL (ref 4.5–11.7)
TOTAL PROTEIN: 7 G/DL (ref 6.4–8.3)
TRIGL SERPL-MCNC: 133 MG/DL (ref 0–149)
TSH SERPL DL<=0.05 MIU/L-ACNC: 6.77 UIU/ML (ref 0.27–4.2)
VITAMIN B-12: 373 PG/ML (ref 211–946)
VLDLC SERPL CALC-MCNC: 27 MG/DL
WBC # BLD: 6.9 E9/L (ref 4.5–11.5)

## 2021-04-06 ENCOUNTER — TELEPHONE (OUTPATIENT)
Dept: ADMINISTRATIVE | Age: 86
End: 2021-04-06

## 2021-04-06 NOTE — TELEPHONE ENCOUNTER
Spoke to patient about results. Patient says his muscles have been tight and painful. Can this be from the steroid?

## 2021-04-13 LAB
BASOPHILS ABSOLUTE: 0.02 E9/L (ref 0–0.2)
BASOPHILS RELATIVE PERCENT: 0.2 % (ref 0–2)
EOSINOPHILS ABSOLUTE: 0.07 E9/L (ref 0.05–0.5)
EOSINOPHILS RELATIVE PERCENT: 0.8 % (ref 0–6)
HCT VFR BLD CALC: 42.1 % (ref 37–54)
HEMOGLOBIN: 13.7 G/DL (ref 12.5–16.5)
IMMATURE GRANULOCYTES #: 0.06 E9/L
IMMATURE GRANULOCYTES %: 0.7 % (ref 0–5)
LYMPHOCYTES ABSOLUTE: 2.05 E9/L (ref 1.5–4)
LYMPHOCYTES RELATIVE PERCENT: 24.5 % (ref 20–42)
MCH RBC QN AUTO: 33.5 PG (ref 26–35)
MCHC RBC AUTO-ENTMCNC: 32.5 % (ref 32–34.5)
MCV RBC AUTO: 102.9 FL (ref 80–99.9)
MONOCYTES ABSOLUTE: 0.74 E9/L (ref 0.1–0.95)
MONOCYTES RELATIVE PERCENT: 8.8 % (ref 2–12)
NEUTROPHILS ABSOLUTE: 5.44 E9/L (ref 1.8–7.3)
NEUTROPHILS RELATIVE PERCENT: 65 % (ref 43–80)
PDW BLD-RTO: 13.2 FL (ref 11.5–15)
PLATELET # BLD: 210 E9/L (ref 130–450)
PMV BLD AUTO: 10.8 FL (ref 7–12)
RBC # BLD: 4.09 E12/L (ref 3.8–5.8)
WBC # BLD: 8.4 E9/L (ref 4.5–11.5)

## 2021-04-17 LAB — IGE: 925 KU/L

## 2021-04-26 ENCOUNTER — TELEPHONE (OUTPATIENT)
Dept: FAMILY MEDICINE CLINIC | Age: 86
End: 2021-04-26

## 2021-04-26 ENCOUNTER — OFFICE VISIT (OUTPATIENT)
Dept: FAMILY MEDICINE CLINIC | Age: 86
End: 2021-04-26
Payer: MEDICARE

## 2021-04-26 VITALS
OXYGEN SATURATION: 92 % | SYSTOLIC BLOOD PRESSURE: 140 MMHG | DIASTOLIC BLOOD PRESSURE: 70 MMHG | WEIGHT: 177 LBS | BODY MASS INDEX: 26.14 KG/M2 | HEART RATE: 92 BPM

## 2021-04-26 DIAGNOSIS — E03.9 HYPOTHYROIDISM, UNSPECIFIED TYPE: ICD-10-CM

## 2021-04-26 DIAGNOSIS — L50.1 IDIOPATHIC URTICARIA: ICD-10-CM

## 2021-04-26 DIAGNOSIS — M25.552 HIP PAIN, ACUTE, LEFT: ICD-10-CM

## 2021-04-26 DIAGNOSIS — M54.42 ACUTE LEFT-SIDED LOW BACK PAIN WITH LEFT-SIDED SCIATICA: Primary | ICD-10-CM

## 2021-04-26 DIAGNOSIS — I10 ESSENTIAL HYPERTENSION: ICD-10-CM

## 2021-04-26 DIAGNOSIS — E78.5 HYPERLIPIDEMIA, UNSPECIFIED HYPERLIPIDEMIA TYPE: ICD-10-CM

## 2021-04-26 PROCEDURE — 99213 OFFICE O/P EST LOW 20 MIN: CPT | Performed by: INTERNAL MEDICINE

## 2021-04-26 RX ORDER — TIZANIDINE 4 MG/1
4 TABLET ORAL NIGHTLY PRN
Qty: 30 TABLET | Refills: 0 | Status: SHIPPED
Start: 2021-04-26 | End: 2022-01-07 | Stop reason: SDUPTHER

## 2021-04-26 RX ORDER — MONTELUKAST SODIUM 10 MG/1
TABLET ORAL
COMMUNITY
Start: 2021-04-07 | End: 2022-03-11

## 2021-04-26 RX ORDER — METHYLPREDNISOLONE 4 MG/1
TABLET ORAL
Qty: 30 TABLET | Refills: 0 | Status: SHIPPED | OUTPATIENT
Start: 2021-04-26 | End: 2021-05-02

## 2021-04-26 NOTE — PROGRESS NOTES
Subjective:     Chief Complaint   Patient presents with    Leg Pain     left weakness    Shoulder Pain     left    Back Pain     lower    Other     itch   Complains of pain in the left lower back, radiating to the left thigh and left leg, for the past 3 weeks, denies any fall or injury,    Pain is worse while sitting, walking,  Using a cane to walk, pain is 4 out of 10,  Taking Tylenol arthritis with some relief,    Denies any abdominal pain, denies any dysuria frequency,  No urinary or bowel symptoms,    He tried cholesterol medication he got muscle cramps so he stopped it    Past Medical History:   Diagnosis Date    Arthritis     Asthma     [facility] disease) ([facility] [address]     Depression     Hiatal hernia     Hx of solitary pulmonary nodule     Hyperglycemia     Hyperlipidemia     Hypertension     Macular degeneration     Neuropathy     Osteoarthritis     Peptic ulcer     PONV (postoperative nausea and vomiting)     Thyroid disease         Social History     Socioeconomic History    Marital status:      Spouse name: Not on file    Number of children: Not on file    Years of education: Not on file    Highest education level: Not on file   Occupational History    Not on file   Social Needs    Financial resource strain: Not on file    Food insecurity     Worry: Not on file     Inability: Not on file   [employer] needs     Medical: Not on file     Non-medical: Not on file   Tobacco Use    Smoking status: Never Smoker    Smokeless tobacco: Never Used   Substance and Sexual Activity    Alcohol use: Yes     Comment: on occasion    Drug use: Never    Sexual activity: Not Currently   Lifestyle    Physical activity     Days per week: Not on file     Minutes per session: Not on file    Stress: Not on file   Relationships    Social connections     Talks on phone: Not on file     Gets together: Not on file     Attends Rastafarian service: Not on file     Active or edema  Neuro: AAOx3,No Focal deficit  Psychological: no depression or anxiety   Back examination no bony tenderness, straight leg raising positive,  Diminished sensation left thigh,  No weakness upper or lower extremities,    Current Outpatient Medications   Medication Sig Dispense Refill    methylPREDNISolone (MEDROL DOSEPACK) 4 MG tablet Take by mouth. 30 tablet 0    tiZANidine (ZANAFLEX) 4 MG tablet Take 1 tablet by mouth nightly as needed (back pain) 30 tablet 0    montelukast (SINGULAIR) 10 MG tablet TAKE 1 TABLET BY MOUTH ONCE DAILY      Influenza Vac Split High-Dose (FLUZONE HIGH-DOSE) 0.5 ML FAHAD injection Fluzone High-Dose 2019-20 (PF) 180 mcg/0.5 mL intramuscular syringe      ketoconazole (NIZORAL) 2 % cream       doxycycline hyclate (VIBRAMYCIN) 100 MG capsule       lisinopril (PRINIVIL;ZESTRIL) 10 MG tablet Take 1 tablet by mouth daily 90 tablet 1    hydrocortisone (ANUSOL-HC) 25 MG suppository Place 1 suppository rectally every 12 hours 20 suppository 1    citalopram (CELEXA) 20 MG tablet Take 1 tablet by mouth daily 90 tablet 1    levothyroxine (SYNTHROID) 25 MCG tablet Take 1 tablet by mouth Daily 90 tablet 1    pantoprazole (PROTONIX) 40 MG tablet Take 1 tablet by mouth daily 90 tablet 1    triamterene-hydroCHLOROthiazide (MAXZIDE-25) 37.5-25 MG per tablet Take 0.5 tablets by mouth daily      tamsulosin (FLOMAX) 0.4 MG capsule Take 1 capsule by mouth daily for 5 days 5 capsule 0    vitamin C (ASCORBIC ACID) 500 MG tablet Take 500 mg by mouth daily      Multiple Vitamins-Minerals (PRESERVISION AREDS 2+MULTI VIT PO) Take 1 tablet by mouth 2 times daily       fluticasone (FLOVENT HFA) 110 MCG/ACT inhaler Inhale 2 puffs into the lungs 2 times daily       multivitamin-iron-minerals-folic acid (CENTRUM) chewable tablet Take 1 tablet by mouth daily       No current facility-administered medications for this visit.          Last 3 BMP  Lab Results   Component Value Date/Time     07/01/2020 04:22 AM    BILITOT 0.5 06/30/2020 03:10 PM    ALKPHOS 70 03/24/2021 10:55 AM    ALKPHOS 53 07/01/2020 04:22 AM    ALKPHOS 65 06/30/2020 03:10 PM    AST 22 03/24/2021 10:55 AM    AST 19 07/01/2020 04:22 AM    AST 25 06/30/2020 03:10 PM    ALT 13 03/24/2021 10:55 AM    ALT 18 07/01/2020 04:22 AM    ALT 22 06/30/2020 03:10 PM        CBC:   Lab Results   Component Value Date/Time    WBC 8.4 04/13/2021 12:00 PM    RBC 4.09 04/13/2021 12:00 PM    HGB 13.7 04/13/2021 12:00 PM    HCT 42.1 04/13/2021 12:00 PM    .9 (H) 04/13/2021 12:00 PM    MCH 33.5 04/13/2021 12:00 PM    MCHC 32.5 04/13/2021 12:00 PM    RDW 13.2 04/13/2021 12:00 PM     04/13/2021 12:00 PM    MPV 10.8 04/13/2021 12:00 PM       A1C:  Lab Results   Component Value Date/Time    LABA1C 5.9 (H) 03/24/2021 10:55 AM       Lipid panel:  Lab Results   Component Value Date    CHOL 236 03/24/2021    CHOL 187 07/01/2020    CHOL 251 07/20/2016    TRIG 133 03/24/2021    TRIG 87 07/01/2020    TRIG 97 07/20/2016    HDL 61 03/24/2021    HDL 47 07/01/2020    HDL 51 05/21/2020        Lab Results   Component Value Date/Time    PROT 7.0 03/24/2021 10:55 AM    PROT 5.5 (L) 07/01/2020 04:22 AM    PROT 6.8 06/30/2020 03:10 PM       No results found for: MG      Adama Puente was seen today for leg pain, shoulder pain, back pain and other. Diagnoses and all orders for this visit:    Acute left-sided low back pain with left-sided sciatica  -     XR LUMBAR SPINE (2-3 VIEWS); Future  -     XR THORACIC SPINE (2 VIEWS); Future    Hip pain, acute, left  -     XR HIP LEFT (2-3 VIEWS); Future    Idiopathic urticaria    Hyperlipidemia, unspecified hyperlipidemia type    Essential hypertension    Hypothyroidism, unspecified type    Other orders  -     methylPREDNISolone (MEDROL DOSEPACK) 4 MG tablet; Take by mouth. -     tiZANidine (ZANAFLEX) 4 MG tablet;  Take 1 tablet by mouth nightly as needed (back pain)       Patient Active Problem List   Diagnosis    Right inguinal hernia    Dizziness    Essential hypertension    Gastroesophageal reflux disease without esophagitis    Hypothyroidism       Plan: Lower back pain with radiculitis 3 weeks without any injury  Medrol Dosepak  Zanaflex 4 mg at night  X-ray lumbosacral spine, thoracic spine, left hip,      Hyperlipidemia could not tolerate statin severe muscle pain so he stopped it low-cholesterol diet      Hypothyroidism continue same Synthroid 25      Idiopathic urticaria being followed by allergist now on Singulair and he gave some antidepressant  Patient already taking Celexa so he did not fill it    Patient declined physical therapy at this time    No follow-ups on file.        Janet Castillo MD  11:27 AM  4/26/2021     [place]

## 2021-04-28 DIAGNOSIS — M54.17 LUMBOSACRAL RADICULOPATHY: ICD-10-CM

## 2021-04-28 DIAGNOSIS — M54.42 ACUTE LEFT-SIDED LOW BACK PAIN WITH LEFT-SIDED SCIATICA: ICD-10-CM

## 2021-04-28 DIAGNOSIS — S22.081A CLOSED STABLE BURST FRACTURE OF ELEVENTH THORACIC VERTEBRA, INITIAL ENCOUNTER (HCC): Primary | ICD-10-CM

## 2021-04-28 NOTE — PROGRESS NOTES
Spoke to patient Dr. Angela Meza  Still having back pain MRI spine requested  Refer to neurosurgeon Dr. Vesna Richter

## 2021-05-03 ENCOUNTER — TELEPHONE (OUTPATIENT)
Dept: FAMILY MEDICINE CLINIC | Age: 86
End: 2021-05-03

## 2021-05-03 DIAGNOSIS — M54.50 ACUTE BILATERAL LOW BACK PAIN WITHOUT SCIATICA: ICD-10-CM

## 2021-05-03 DIAGNOSIS — S22.081A CLOSED STABLE BURST FRACTURE OF ELEVENTH THORACIC VERTEBRA, INITIAL ENCOUNTER (HCC): Primary | ICD-10-CM

## 2021-05-03 NOTE — TELEPHONE ENCOUNTER
Patient called and left message that would like something for MRI[facility], would like to have Hedy call him back to explain further.      [phone]

## 2021-05-19 ENCOUNTER — TELEPHONE (OUTPATIENT)
Dept: FAMILY MEDICINE CLINIC | Age: 86
End: 2021-05-19

## 2021-05-19 DIAGNOSIS — M54.50 ACUTE BILATERAL LOW BACK PAIN WITHOUT SCIATICA: ICD-10-CM

## 2021-05-19 DIAGNOSIS — S22.081A CLOSED STABLE BURST FRACTURE OF ELEVENTH THORACIC VERTEBRA, INITIAL ENCOUNTER (HCC): ICD-10-CM

## 2021-05-19 NOTE — TELEPHONE ENCOUNTER
Patient had MRI of back. He has appointment here Monday and in [address] [place] [address] Road next Wednesday. Should he still come here on Monday?

## 2021-05-27 ENCOUNTER — APPOINTMENT (OUTPATIENT)
Dept: CT IMAGING | Age: 86
End: 2021-05-27
Payer: MEDICARE

## 2021-05-27 ENCOUNTER — HOSPITAL ENCOUNTER (EMERGENCY)
Age: 86
Discharge: HOME | End: 2021-05-27
Attending: EMERGENCY MEDICINE
Payer: MEDICARE

## 2021-05-27 VITALS
SYSTOLIC BLOOD PRESSURE: 151 MMHG | DIASTOLIC BLOOD PRESSURE: 97 MMHG | RESPIRATION RATE: 16 BRPM | TEMPERATURE: 98 F | WEIGHT: 174 LBS | HEART RATE: 91 BPM | BODY MASS INDEX: 25.7 KG/M2 | OXYGEN SATURATION: 97 %

## 2021-05-27 DIAGNOSIS — R42 LIGHTHEADED: Primary | ICD-10-CM

## 2021-05-27 DIAGNOSIS — I10 ESSENTIAL HYPERTENSION: ICD-10-CM

## 2021-05-27 LAB
ALBUMIN SERPL-MCNC: 3.9 G/DL (ref 3.5–5.2)
ALP BLD-CCNC: 75 U/L (ref 40–129)
ALT SERPL-CCNC: 16 U/L (ref 0–40)
ANION GAP SERPL CALCULATED.3IONS-SCNC: 11 MMOL/L (ref 7–16)
AST SERPL-CCNC: 21 U/L (ref 0–39)
BASOPHILS ABSOLUTE: 0.03 E9/L (ref 0–0.2)
BASOPHILS RELATIVE PERCENT: 0.4 % (ref 0–2)
BILIRUB SERPL-MCNC: 0.5 MG/DL (ref 0–1.2)
BUN BLDV-MCNC: 18 MG/DL (ref 6–23)
CALCIUM SERPL-MCNC: 9.5 MG/DL (ref 8.6–10.2)
CHLORIDE BLD-SCNC: 105 MMOL/L (ref 98–107)
CO2: 24 MMOL/L (ref 22–29)
CREAT SERPL-MCNC: 0.8 MG/DL (ref 0.7–1.2)
EOSINOPHILS ABSOLUTE: 0.3 E9/L (ref 0.05–0.5)
EOSINOPHILS RELATIVE PERCENT: 4.4 % (ref 0–6)
GFR AFRICAN AMERICAN: >60
GFR NON-AFRICAN AMERICAN: >60 ML/MIN/1.73
GLUCOSE BLD-MCNC: 117 MG/DL (ref 74–99)
HCT VFR BLD CALC: 37.9 % (ref 37–54)
HEMOGLOBIN: 12.6 G/DL (ref 12.5–16.5)
IMMATURE GRANULOCYTES #: 0.02 E9/L
IMMATURE GRANULOCYTES %: 0.3 % (ref 0–5)
LYMPHOCYTES ABSOLUTE: 1.46 E9/L (ref 1.5–4)
LYMPHOCYTES RELATIVE PERCENT: 21.6 % (ref 20–42)
MCH RBC QN AUTO: 32.9 PG (ref 26–35)
MCHC RBC AUTO-ENTMCNC: 33.2 % (ref 32–34.5)
MCV RBC AUTO: 99 FL (ref 80–99.9)
MONOCYTES ABSOLUTE: 0.71 E9/L (ref 0.1–0.95)
MONOCYTES RELATIVE PERCENT: 10.5 % (ref 2–12)
NEUTROPHILS ABSOLUTE: 4.25 E9/L (ref 1.8–7.3)
NEUTROPHILS RELATIVE PERCENT: 62.8 % (ref 43–80)
PDW BLD-RTO: 13.1 FL (ref 11.5–15)
PLATELET # BLD: 283 E9/L (ref 130–450)
PMV BLD AUTO: 10.5 FL (ref 7–12)
POTASSIUM REFLEX MAGNESIUM: 4.2 MMOL/L (ref 3.5–5)
RBC # BLD: 3.83 E12/L (ref 3.8–5.8)
SODIUM BLD-SCNC: 140 MMOL/L (ref 132–146)
TOTAL PROTEIN: 6.2 G/DL (ref 6.4–8.3)
WBC # BLD: 6.8 E9/L (ref 4.5–11.5)

## 2021-05-27 PROCEDURE — 85025 COMPLETE CBC W/AUTO DIFF WBC: CPT

## 2021-05-27 PROCEDURE — 6370000000 HC RX 637 (ALT 250 FOR IP): Performed by: EMERGENCY MEDICINE

## 2021-05-27 PROCEDURE — 80053 COMPREHEN METABOLIC PANEL: CPT

## 2021-05-27 PROCEDURE — 99285 EMERGENCY DEPT VISIT HI MDM: CPT

## 2021-05-27 PROCEDURE — 93005 ELECTROCARDIOGRAM TRACING: CPT | Performed by: EMERGENCY MEDICINE

## 2021-05-27 PROCEDURE — 70450 CT HEAD/BRAIN W/O DYE: CPT

## 2021-05-27 RX ORDER — TRIAMTERENE AND HYDROCHLOROTHIAZIDE 37.5; 25 MG/1; MG/1
0.5 TABLET ORAL ONCE
Status: COMPLETED | OUTPATIENT
Start: 2021-05-27 | End: 2021-05-27

## 2021-05-27 RX ORDER — LISINOPRIL 10 MG/1
10 TABLET ORAL ONCE
Status: COMPLETED | OUTPATIENT
Start: 2021-05-27 | End: 2021-05-27

## 2021-05-27 RX ADMIN — TRIAMTERENE AND HYDROCHLOROTHIAZIDE 0.5 TABLET: 37.5; 25 TABLET ORAL at 14:43

## 2021-05-27 RX ADMIN — LISINOPRIL 10 MG: 10 TABLET ORAL at 14:43

## 2021-05-27 ASSESSMENT — ENCOUNTER SYMPTOMS
SORE THROAT: 0
EYE PAIN: 0
COUGH: 0
BACK PAIN: 0
ABDOMINAL PAIN: 0
SHORTNESS OF BREATH: 0
NAUSEA: 0
DIARRHEA: 0
VOMITING: 0

## 2021-05-27 NOTE — ED PROVIDER NOTES
Pupils: Pupils are equal, round, and reactive to light. Cardiovascular:      Rate and Rhythm: Normal rate and regular rhythm. Pulses: Normal pulses. Heart sounds: Normal heart sounds. No murmur heard. Pulmonary:      Effort: Pulmonary effort is normal. No respiratory distress. Breath sounds: Normal breath sounds. No wheezing or rales. Abdominal:      Palpations: Abdomen is soft. Tenderness: There is no abdominal tenderness. There is no guarding or rebound. Musculoskeletal:         General: No tenderness. Normal range of motion. Cervical back: Normal range of motion and neck supple. No tenderness. Right lower leg: No edema. Left lower leg: No edema. Skin:     General: Skin is warm and dry. Capillary Refill: Capillary refill takes less than 2 seconds. Neurological:      General: No focal deficit present. Mental Status: He is alert and oriented to person, place, and time. Cranial Nerves: No cranial nerve deficit. Sensory: No sensory deficit. Motor: No weakness. Coordination: Coordination normal.      Comments: 5 out of 5 muscle strength in the bilateral upper and lower extremities. No ataxia. No drift. NIH stroke scale 0. Neurovascularly intact. Procedures     MDM   Patient presented to the Emergency Department for hypertension and dizziness. They are clinically stable, non toxic appearing, but hypertensive on exam.  Patient has not taken his blood pressure medication today. Patient given his home dose of blood pressure medication in the department. Prior to his medication he was orthostatic on exam.  He is neurovascularly intact with no focal logic deficits. CVA considered but less likely with history and physical exam findings. EKG unremarkable. History and physical exam less concerning for ACS. Lab work obtained is unremarkable. Patient's blood pressure improved after antihypertensives. CT head was unremarkable. [penicillins], and Prednisone    -------------------------------------------------- RESULTS -------------------------------------------------  Labs:  Results for orders placed or performed during the hospital encounter of 05/27/21   CBC Auto Differential   Result Value Ref Range    WBC 6.8 4.5 - 11.5 E9/L    RBC 3.83 3.80 - 5.80 E12/L    Hemoglobin 12.6 12.5 - 16.5 g/dL    Hematocrit 37.9 37.0 - 54.0 %    MCV 99.0 80.0 - 99.9 fL    MCH 32.9 26.0 - 35.0 pg    MCHC 33.2 32.0 - 34.5 %    RDW 13.1 11.5 - 15.0 fL    Platelets 663 563 - 253 E9/L    MPV 10.5 7.0 - 12.0 fL    Neutrophils % 62.8 43.0 - 80.0 %    Immature Granulocytes % 0.3 0.0 - 5.0 %    Lymphocytes % 21.6 20.0 - 42.0 %    Monocytes % 10.5 2.0 - 12.0 %    Eosinophils % 4.4 0.0 - 6.0 %    Basophils % 0.4 0.0 - 2.0 %    Neutrophils Absolute 4.25 1.80 - 7.30 E9/L    Immature Granulocytes # 0.02 E9/L    Lymphocytes Absolute 1.46 (L) 1.50 - 4.00 E9/L    Monocytes Absolute 0.71 0.10 - 0.95 E9/L    Eosinophils Absolute 0.30 0.05 - 0.50 E9/L    Basophils Absolute 0.03 0.00 - 0.20 E9/L   Comprehensive Metabolic Panel w/ Reflex to MG   Result Value Ref Range    Sodium 140 132 - 146 mmol/L    Potassium reflex Magnesium 4.2 3.5 - 5.0 mmol/L    Chloride 105 98 - 107 mmol/L    CO2 24 22 - 29 mmol/L    Anion Gap 11 7 - 16 mmol/L    Glucose 117 (H) 74 - 99 mg/dL    BUN 18 6 - 23 mg/dL    CREATININE 0.8 0.7 - 1.2 mg/dL    GFR Non-African American >60 >=60 mL/min/1.73    GFR African American >60     Calcium 9.5 8.6 - 10.2 mg/dL    Total Protein 6.2 (L) 6.4 - 8.3 g/dL    Albumin 3.9 3.5 - 5.2 g/dL    Total Bilirubin 0.5 0.0 - 1.2 mg/dL    Alkaline Phosphatase 75 40 - 129 U/L    ALT 16 0 - 40 U/L    AST 21 0 - 39 U/L   EKG 12 Lead   Result Value Ref Range    Ventricular Rate 83 BPM    Atrial Rate 83 BPM    P-R Interval 188 ms    QRS Duration 132 ms    Q-T Interval 392 ms    QTc Calculation (Bazett) 460 ms    P Axis 36 degrees    R Axis -38 degrees    T Axis -24 degrees Radiology:  CT Head WO Contrast   Final Result   1. There is no acute intracranial abnormality. Specifically, there is no   intracranial hemorrhage. 2. Atrophy and periventricular leukomalacia,             EKG:  This EKG is signed and interpreted by ED Physician. Time:  1351   Rate: 83  Rhythm: Sinus. Interpretation: non-specific EKG. left axis deviation. Right bundle branch block. QTc 460. . No acute ST segment elevation  Comparison: stable as compared to patient's most recent EKG.      ------------------------- NURSING NOTES AND VITALS REVIEWED ---------------------------  Date / Time Roomed:  5/27/2021  1:27 PM  ED Bed Assignment:  [place] [id number]    The nursing notes within the ED encounter and vital signs as below have been reviewed. BP (!) 151/97   Pulse 91   Temp 98 °F (36.7 °C)   Resp 16   Wt 174 lb (78.9 kg)   SpO2 97%   BMI 25.70 kg/m²   Oxygen Saturation Interpretation: Normal      ------------------------------------------ PROGRESS NOTES ------------------------------------------  ED COURSE MEDICATIONS:                Medications   triamterene-hydroCHLOROthiazide (MAXZIDE-25) 37.5-25 MG per tablet 0.5 tablet (0.5 tablets Oral Given 5/27/21 1443)   lisinopril (PRINIVIL;ZESTRIL) tablet 10 mg (10 mg Oral Given 5/27/21 1443)       I have spoken with the patient and discussed todays results, in addition to providing specific details for the plan of care and counseling regarding the diagnosis and prognosis. Their questions are answered at this time and they are agreeable with the plan. I discussed at length with them reasons for immediate return here for re evaluation. They will followup with primary care by calling their office tomorrow. --------------------------------- ADDITIONAL PROVIDER NOTES ---------------------------------  At this time the patient is without objective evidence of an acute process requiring hospitalization or inpatient management.   They have remained hemodynamically stable throughout their entire ED visit and are stable for discharge with outpatient follow-up. The plan has been discussed in detail and they are aware of the specific conditions for emergent return, as well as the importance of follow-up. Discharge Medication List as of 5/27/2021  5:18 PM          Diagnosis:  1. Lightheaded    2. Essential hypertension        Disposition:  Patient's disposition: Discharge to home  Patient's condition is stable. I have consulted with the resident/MLP. Refer to the separate ED attending note for my evaluation and examination of this patient. I reviewed appropriate laboratory and imaging results. I concur with the diagnosis, treatment and plan.      Birgit Oviedo DO  Resident  05/27/21 [address] [place] [address], MD  06/03/21 7158

## 2021-05-27 NOTE — ED NOTES
Bed: H2  Expected date:   Expected time:   Means of arrival:   Comments:  clif Beasley, MAYE  05/27/21 4780

## 2021-05-27 NOTE — ED NOTES
Pt ambulated into rest room independently. Son updated at bedside. Patient given warm blanket.        Fermín Medina RN  05/27/21 2282

## 2021-05-27 NOTE — ED NOTES
Patient was discussed with the with the  Resident. Please refer the resident's note for final disposition and medical decision making. Subjective:  Ronni Hooper is an 80-year-old male was dizzy lightheaded he woke up this morning. His symptoms worsened with standing and trying to walk. Improved and he sat still rested. He is not currently dizzy. He did not take his medication this morning. He is hypertensive. He denies any headache or blurred vision. He has no change in speech. He has no focal weakness or numbness. His gait is stable.     Objective:  Vitals:    05/27/21 1444   BP: (!) 214/103   Pulse: 87   Resp: 16   Temp:    SpO2: 98%     General Appearance:   Alert, cooperative, no distress, appears stated age  Head:   Normocephalic, without obvious abnormality, atraumatic  Eyes:   PERRL, conjunctiva/corneas clear, no nystagmus   EOM's intact, fundi    benign, both eyesEars:   Normal TM's and external ear canals, both earsNose:    Nares normal, septum midline, mucosa normal, no drainage    or sinus tenderness  Throat:  Lips, mucosa, and tongue normal; teeth and gums normal  Neck:  Supple, symmetrical, trachea midline, no adenopathy;    thyroid:  no enlargement/tenderness/nodules; no carotid   bruit or JVD  Back:    Symmetric, no curvature, ROM normal, no CVA tendernessLungs:    Clear to auscultation bilaterally, respirations unlabored  Chest Wall:   No tenderness or deformity Heart:   Regular rate and rhythm, S1 and S2 normal, no murmur, rub   or gallop  Abdomen:    Soft, mild epigastric tenderness, bowel sounds active all four quadrants, no masses, no organomegaly  Extremities:  Extremities normal, atraumatic, no cyanosis or edema Pulses:  2+ and symmetric all extremities  Skin:  Skin color, texture, turgor normal, no rashes or lesionsLymph nodes:  Cervical, supraclavicular, and axillary nodes normal  Neurologic:  CNII-XII intact, normal strength, sensation and reflexes throughout    Assessment:  Vertigo

## 2021-05-28 ENCOUNTER — TELEPHONE (OUTPATIENT)
Dept: FAMILY MEDICINE CLINIC | Age: 86
End: 2021-05-28

## 2021-05-28 LAB
EKG ATRIAL RATE: 83 BPM
EKG P AXIS: 36 DEGREES
EKG P-R INTERVAL: 188 MS
EKG Q-T INTERVAL: 392 MS
EKG QRS DURATION: 132 MS
EKG QTC CALCULATION (BAZETT): 460 MS
EKG R AXIS: -38 DEGREES
EKG T AXIS: -24 DEGREES
EKG VENTRICULAR RATE: 83 BPM

## 2021-05-28 NOTE — TELEPHONE ENCOUNTER
Please advise if OK to schedule with you since Dr. Saira Cortes is out of the ofc next week and there is no availability the week after.     Last seen 4/26/2021  Next appt Visit date not found

## 2021-05-28 NOTE — TELEPHONE ENCOUNTER
I called patient to inform. As I was scheduling appt, phone disconnected. I called patient back to give him the time and the phone disconnected again. I called patient back and was unable to get through since I continued to get a fast, busy signal.  Will continue to try to reach patient to confirm appt time on 6/1/21. I called patient back two more times, was able to inform patient of appt time, 2:30 pm, 6/1/21 then phone disconnected.

## 2021-05-28 NOTE — TELEPHONE ENCOUNTER
Pt called and was seen in [facility] ER yesterday for elevated bp. His bp when he arrived was 210, did not know bottom number. When he left it was 151/97. He had not taken his meds when he arrived but was given them in ER. He was told to f/u with pcp to change/increase his meds. Pcp out of the office next week and no availability the week after. Pt is concerned and has been unable to reach the office directly. Please contact pt with further recommendations.

## 2021-06-02 ENCOUNTER — OFFICE VISIT (OUTPATIENT)
Dept: FAMILY MEDICINE CLINIC | Age: 86
End: 2021-06-02
Payer: MEDICARE

## 2021-06-02 VITALS
WEIGHT: 171 LBS | TEMPERATURE: 98 F | SYSTOLIC BLOOD PRESSURE: 108 MMHG | DIASTOLIC BLOOD PRESSURE: 62 MMHG | HEART RATE: 90 BPM | RESPIRATION RATE: 16 BRPM | OXYGEN SATURATION: 96 % | HEIGHT: 69 IN | BODY MASS INDEX: 25.33 KG/M2

## 2021-06-02 DIAGNOSIS — R42 DIZZINESS: Primary | ICD-10-CM

## 2021-06-02 PROCEDURE — 99213 OFFICE O/P EST LOW 20 MIN: CPT | Performed by: FAMILY MEDICINE

## 2021-06-02 SDOH — ECONOMIC STABILITY: FOOD INSECURITY: WITHIN THE PAST 12 MONTHS, THE FOOD YOU BOUGHT JUST DIDN'T LAST AND YOU DIDN'T HAVE MONEY TO GET MORE.: NEVER TRUE

## 2021-06-02 SDOH — ECONOMIC STABILITY: FOOD INSECURITY: WITHIN THE PAST 12 MONTHS, YOU WORRIED THAT YOUR FOOD WOULD RUN OUT BEFORE YOU GOT MONEY TO BUY MORE.: NEVER TRUE

## 2021-06-02 ASSESSMENT — ENCOUNTER SYMPTOMS
DIARRHEA: 0
SHORTNESS OF BREATH: 0
WHEEZING: 0
BLOOD IN STOOL: 0
CONSTIPATION: 1
NAUSEA: 0
COUGH: 0
VOMITING: 0
ROS SKIN COMMENTS: ECZEMA
ABDOMINAL PAIN: 0
SORE THROAT: 0
BACK PAIN: 0

## 2021-06-02 ASSESSMENT — SOCIAL DETERMINANTS OF HEALTH (SDOH): HOW HARD IS IT FOR YOU TO PAY FOR THE VERY BASICS LIKE FOOD, HOUSING, MEDICAL CARE, AND HEATING?: NOT HARD AT ALL

## 2021-06-02 NOTE — PATIENT INSTRUCTIONS
Patient Education        Orthostatic Hypotension: Care Instructions  Your Care Instructions     Orthostatic hypotension is a quick drop in blood pressure. It happens when you get up from sitting or lying down. You may feel faint, lightheaded, or dizzy. When a person sits up or stands up, the body changes the way it pumps blood. This can slow the flow of blood to the brain for a very short time. And that can make you feel lightheaded. Many medicines can cause this problem, especially in older people. Lack of fluids (dehydration) or illnesses such as diabetes or heart disease also can cause it. Follow-up care is a key part of your treatment and safety. Be sure to make and go to all appointments, and call your doctor if you are having problems. It's also a good idea to know your test results and keep a list of the medicines you take. How can you care for yourself at home? · Be safe with medicines. Call your doctor if you think you are having a problem with your medicine. You will get more details on the specific medicines your doctor prescribes. · If you feel dizzy or lightheaded, sit down or lie down for a few minutes. Or you can sit down and put your head between your knees. This will help your blood pressure go back to normal and help your symptoms go away. · Follow your doctor's suggestions for ways to prevent symptoms like dizziness. These suggestions may include:  ? Get up slowly from bed or after sitting for a long time. If you are in bed, roll to your side and swing your legs over the edge of the bed and onto the floor. Push your body up to a sitting position. Wait for a while before you slowly stand up.  ? Add more salt to your diet, if your doctor recommends it. ? Drink plenty of fluids. Choose water and other caffeine-free clear liquids. If you have kidney, heart, or liver disease and have to limit fluids, talk with your doctor before you increase the amount of fluids you drink. ?  Avoid or limit alcohol to 2 drinks a day for men and 1 drink a day for women. Alcohol may interfere with your medicine. In addition, alcohol can make your low blood pressure worse by causing your body to lose water. ? Avoid or limit caffeine. Caffeine can cause your body to lose water. ? Wear compression stockings to help improve blood flow. When should you call for help? Call 911 anytime you think you may need emergency care. For example, call if:    · You passed out (lost consciousness). Watch closely for changes in your health, and be sure to contact your doctor if:    · You do not get better as expected. Where can you learn more? Go to https://PropelAd.compepiceweb.MalibuIQ. org and sign in to your [employer] account. Enter Y727 in the [employer] box to learn more about \"Orthostatic Hypotension: Care Instructions. \"     If you do not have an account, please click on the \"Sign Up Now\" link. Current as of: August 31, 2020               Content Version: 12.8  © 2006-2021 [employer]. Care instructions adapted under license by [facility] ([facility]). If you have questions about a medical condition or this instruction, always ask your healthcare professional. [place] [address] any warranty or liability for your use of this information. Patient Education        Benign Paroxysmal Positional Vertigo (BPPV): Care Instructions  Your Care Instructions     Benign paroxysmal positional vertigo, also called BPPV, is an inner ear problem. It causes a spinning or whirling sensation when you move your head. This sensation is called vertigo. The vertigo usually lasts for less than a minute. People often have vertigo spells for a few days or weeks. Then the vertigo goes away. But it may come back again. The vertigo may be mild, or it may be bad enough to cause unsteadiness, nausea, and vomiting. When you move, your inner ear sends messages to the brain. This helps you keep your balance.  Vertigo can happen when debris builds up in the inner ear. The buildup can cause the inner ear to send the wrong message to the brain. Your doctor may move you in different positions to help your vertigo get better faster. This is called the Epley maneuver. Your doctor may also prescribe medicines or exercises to help with your symptoms. Follow-up care is a key part of your treatment and safety. Be sure to make and go to all appointments, and call your doctor if you are having problems. It's also a good idea to know your test results and keep a list of the medicines you take. How can you care for yourself at home? · If your doctor suggests that you do Trinh-Daroff exercises:  ? Sit on the edge of a bed or sofa. Quickly lie down on the side that causes the worst vertigo. Lie on your side with your ear down. ? Stay in this position for at least 30 seconds or until the vertigo goes away. ? Sit up. If this causes vertigo, wait for it to stop. ? Repeat the procedure on the other side. ? Repeat this 10 times. Do these exercises 2 times a day until the vertigo is gone. When should you call for help? Call 911 anytime you think you may need emergency care. For example, call if:    · You have symptoms of a stroke. These may include:  ? Sudden numbness, tingling, weakness, or loss of movement in your face, arm, or leg, especially on only one side of your body. ? Sudden vision changes. ? Sudden trouble speaking. ? Sudden confusion or trouble understanding simple statements. ? Sudden problems with walking or balance. ? A sudden, severe headache that is different from past headaches. Call your doctor now or seek immediate medical care if:    · You have new or worse nausea and vomiting.     · You have new symptoms such as hearing loss or roaring in your ears. Watch closely for changes in your health, and be sure to contact your doctor if:    · You are not getting better as expected.     · Your vertigo gets worse. Where can you learn more? Go to https://chpepiceweb.BaseKit. org and sign in to your [employer] account. Enter  in the [facility] box to learn more about \"Benign Paroxysmal Positional Vertigo (BPPV): Care Instructions. \"     If you do not have an account, please click on the \"Sign Up Now\" link. Current as of: December 2, 2020               Content Version: 12.8  © 2006-2021 Healthwise, [employer]. Care instructions adapted under license by [facility] ([facility]). If you have questions about a medical condition or this instruction, always ask your healthcare professional. [place] [address] any warranty or liability for your use of this information. Patient Education        Constipation: Care Instructions  Your Care Instructions     Constipation means that you have a hard time passing stools (bowel movements). People pass stools from 3 times a day to once every 3 days. What is normal for you may be different. Constipation may occur with pain in the rectum and cramping. The pain may get worse when you try to pass stools. Sometimes there are small amounts of bright red blood on toilet paper or the surface of stools. This is because of enlarged veins near the rectum (hemorrhoids). A few changes in your diet and lifestyle may help you avoid ongoing constipation. Your doctor may also prescribe medicine to help loosen your stool. Some medicines can cause constipation. These include pain medicines and antidepressants. Tell your doctor about all the medicines you take. Your doctor may want to make a medicine change to ease your symptoms. Follow-up care is a key part of your treatment and safety. Be sure to make and go to all appointments, and call your doctor if you are having problems. It's also a good idea to know your test results and keep a list of the medicines you take. How can you care for yourself at home? · Drink plenty of fluids.  If you have kidney, heart, or liver disease and have to limit fluids, talk with your doctor before you increase the amount of fluids you drink. · Include high-fiber foods in your diet each day. These include fruits, vegetables, beans, and whole grains. · Get at least 30 minutes of exercise on most days of the week. Walking is a good choice. You also may want to do other activities, such as running, swimming, cycling, or playing tennis or team sports. · Take a fiber supplement, such as Citrucel or Metamucil, every day. Read and follow all instructions on the label. · Schedule time each day for a bowel movement. A daily routine may help. Take your time having your bowel movement. · Support your feet with a small step stool when you sit on the toilet. This helps flex your hips and places your pelvis in a squatting position. · Your doctor may recommend an over-the-counter laxative to relieve your constipation. Examples are Milk of Magnesia and MiraLax. Read and follow all instructions on the label. Do not use laxatives on a long-term basis. When should you call for help? Call your doctor now or seek immediate medical care if:    · You have new or worse belly pain.     · You have new or worse nausea or vomiting.     · You have blood in your stools. Watch closely for changes in your health, and be sure to contact your doctor if:    · Your constipation is getting worse.     · You do not get better as expected. Where can you learn more? Go to https://YoPro Globaljosefina.The [employer] Music Movement. org and sign in to your [employer] account. Enter [phone] in the [facility] box to learn more about \"Constipation: Care Instructions. \"     If you do not have an account, please click on the \"Sign Up Now\" link. Current as of: February 26, 2020               Content Version: 12.8  © 6755-8863 Healthwise, [employer]. Care instructions adapted under license by [facility] ([facility]).  If you have questions about a medical condition or this instruction, always ask your healthcare professional. [address] any warranty or liability for your use of this information.

## 2021-06-02 NOTE — PROGRESS NOTES
Narendra Orozco is a 80 y.o. male who presents today for     Chief Complaint   Patient presents with    Hypertension     er follow up, feels like going to be light headed but then does not. EMERGENCY ROOM VISIT 5/27/21:  HPI   Patient is a 80 y.o. male with a past medical history of asthma, COPD, hyperlipidemia, hypertension, presenting to the Emergency Department for hypertension and dizziness. History obtained by patient. Symptoms are moderate in severity and improved since onset. They are improved by time and sitting and worsened by standing. Patient presents for hypertension dizziness. Dizziness is described as lightheadedness and feeling off. He states he is in the bathroom and had 2 bowel movements while in the toilet when he stood up to walk out he felt very lightheaded and \"just off\". He states he went and sat down on the couch and all of the symptoms did resolve. He is not currently dizzy or lightheaded while standing there. He was found to be hypertensive by EMS. He states he has not taken any of his medications as of yet today. He denies any blurry vision, changes in vision. He has no chest pain, shortness of breath, abdominal pain, diarrhea or constipation.     Review of Systems   Neurological: Positive for dizziness and light-headedness. Negative for weakness, numbness and headaches. All other systems reviewed and are negative.        Physical Exam  Vitals and nursing note reviewed. Constitutional:       General: He is not in acute distress. Appearance: He is well-developed. He is not ill-appearing. HENT:      Head: Normocephalic and atraumatic. Eyes:      Extraocular Movements: Extraocular movements intact. Pupils: Pupils are equal, round, and reactive to light. Cardiovascular:      Rate and Rhythm: Normal rate and regular rhythm. Pulses: Normal pulses. Heart sounds: Normal heart sounds. No murmur heard.      Pulmonary:      Effort: Pulmonary effort is normal. No respiratory distress. Breath sounds: Normal breath sounds. No wheezing or rales. Abdominal:      Palpations: Abdomen is soft. Tenderness: There is no abdominal tenderness. There is no guarding or rebound. Musculoskeletal:         General: No tenderness. Normal range of motion. Cervical back: Normal range of motion and neck supple. No tenderness. Right lower leg: No edema. Left lower leg: No edema. Skin:     General: Skin is warm and dry. Capillary Refill: Capillary refill takes less than 2 seconds. Neurological:      General: No focal deficit present. Mental Status: He is alert and oriented to person, place, and time. Cranial Nerves: No cranial nerve deficit. Sensory: No sensory deficit. Motor: No weakness. Coordination: Coordination normal.      Comments: 5 out of 5 muscle strength in the bilateral upper and lower extremities. No ataxia. No drift. NIH stroke scale 0. Neurovascularly intact.          Medical Decision Making:  Patient presented to the Emergency Department for hypertension and dizziness. They are clinically stable, non toxic appearing, but hypertensive on exam.  Patient has not taken his blood pressure medication today. Patient given his home dose of blood pressure medication in the department. Prior to his medication he was orthostatic on exam.  He is neurovascularly intact with no focal logic deficits. CVA considered but less likely with history and physical exam findings. EKG unremarkable. History and physical exam less concerning for ACS. Lab work obtained is unremarkable. Patient's blood pressure improved after antihypertensives. CT head was unremarkable. Upon repeat evaluation and blood pressure improvement patient is asymptomatic. He ambulated to and from the bathroom without difficulty and without symptoms.   He is neurovascular intact.     Strict return precautions were discussed including but not limited too weakness, numbness, tingling, chest pain, vision changes, new or worsening symtpoms. They verbalized understanding and were agreeable with the plan. All questions were answered and patient was discharged.         ED Course as of May 27 2349   Thu May 27, 2021   1651 Patient reevaluated, feeling greatly improved. He does ambulate to and from the bathroom. No episodes of lightheadedness or dizziness during ambulation. He is sitting in the bed with no complaints. NIH stroke scale 0. Neurovascular intact. []       ED Course User Index  [] Jessa Dada-DO Georgi            --------------------------------------------- PAST HISTORY ---------------------------------------------  Past Medical History:  has a past medical history of Arthritis, Asthma, COPD (chronic obstructive pulmonary disease) ([facility] [address] Depression, Hiatal hernia, Hx of solitary pulmonary nodule, Hyperglycemia, Hyperlipidemia, Hypertension, Macular degeneration, Neuropathy, Osteoarthritis, Peptic ulcer, PONV (postoperative nausea and vomiting), and Thyroid disease.    ------------------------- NURSING NOTES AND VITALS REVIEWED ---------------------------       The nursing notes within the ED encounter and vital signs as below have been reviewed. BP (!) 151/97   Pulse 91   Temp 98 °F (36.7 °C)   Resp 16   Wt 174 lb (78.9 kg)   SpO2 97%   BMI 25.70 kg/m²   Oxygen Saturation Interpretation: Normal        ------------------------------------------ PROGRESS NOTES ------------------------------------------  ED COURSE MEDICATIONS:                Medications   triamterene-hydroCHLOROthiazide (MAXZIDE-25) 37.5-25 MG per tablet 0.5 tablet (0.5 tablets Oral Given 5/27/21 1443)   lisinopril (PRINIVIL;ZESTRIL) tablet 10 mg (10 mg Oral Given 5/27/21 1443)         Diagnosis:  1. Lightheaded    2. Essential hypertension          CURRENT STATUS (06/02/21):  Denies S/S lightheadedness or dizziness today.   Further history: poor hydration, poor sleep due to itching from eczema. He is mindful of not changing position too rapidly. He took all medications as directed. [address]:  Patient's past medical, surgical, social and/or family history reviewed, updated in chart, and are non-contributory (unless otherwise stated). Medications and allergies also reviewed and updated in chart. Review of Systems  Review of Systems   HENT: Negative for congestion, ear pain and sore throat. Respiratory: Negative for cough, shortness of breath and wheezing. Cardiovascular: Negative for chest pain, palpitations and leg swelling. Gastrointestinal: Positive for constipation. Negative for abdominal pain, blood in stool, diarrhea, nausea and vomiting. Genitourinary: Negative for dysuria, frequency, hematuria and urgency. Musculoskeletal: Negative for back pain, myalgias and neck pain. Skin: Negative for rash. Eczema   Neurological: Negative for dizziness, weakness and headaches. Psychiatric/Behavioral: Positive for dysphoric mood. The patient is not nervous/anxious. Physical Exam:    VS:  /62   Pulse 90   Temp 98 °F (36.7 °C) (Infrared)   Resp 16   Ht 5' 9\" (1.753 m)   Wt 171 lb (77.6 kg)   SpO2 96%   BMI 25.25 kg/m²     LAST WEIGHT:  Wt Readings from Last 3 Encounters:   06/02/21 171 lb (77.6 kg)   05/27/21 174 lb (78.9 kg)   04/26/21 177 lb (80.3 kg)       BMI Readings from Last 3 Encounters:   06/02/21 25.25 kg/m²   05/27/21 25.70 kg/m²   04/26/21 26.14 kg/m²       Physical Exam  Constitutional:       General: He is not in acute distress. Appearance: He is well-developed. He is not diaphoretic. HENT:      Head: Normocephalic and atraumatic. Right Ear: External ear normal.      Left Ear: External ear normal.      Mouth/Throat:      Pharynx: No oropharyngeal exudate. Eyes:      General: No scleral icterus. Right eye: No discharge.       Conjunctiva/sclera: Conjunctivae normal.      Pupils: Pupils are equal, round, and reactive to light. Neck:      Thyroid: No thyromegaly. Cardiovascular:      Rate and Rhythm: Normal rate and regular rhythm. Heart sounds: Normal heart sounds. No murmur heard. Pulmonary:      Effort: Pulmonary effort is normal. No respiratory distress. Breath sounds: No stridor. No wheezing or rales. Chest:      Chest wall: No tenderness. Abdominal:      General: Bowel sounds are normal. There is no distension. Palpations: Abdomen is soft. There is no mass. Tenderness: There is no abdominal tenderness. There is no guarding. Musculoskeletal:         General: No tenderness. Normal range of motion. Cervical back: Normal range of motion and neck supple. Lymphadenopathy:      Cervical: No cervical adenopathy. Skin:     General: Skin is warm and dry. Coloration: Skin is not pale. Findings: No erythema or rash. Neurological:      Mental Status: He is alert and oriented to person, place, and time. Psychiatric:         Behavior: Behavior normal.         Thought Content:  Thought content normal.         Labs:  Lab Results   Component Value Date     05/27/2021    K 4.2 05/27/2021     05/27/2021    CO2 24 05/27/2021    BUN 18 05/27/2021    CREATININE 0.8 05/27/2021    PROT 6.2 05/27/2021    LABALBU 3.9 05/27/2021    CALCIUM 9.5 05/27/2021    GFRAA >60 05/27/2021    LABGLOM >60 05/27/2021    GLUCOSE 117 05/27/2021    AST 21 05/27/2021    ALT 16 05/27/2021    ALKPHOS 75 05/27/2021    BILITOT 0.5 05/27/2021    TSH 6.770 03/24/2021    CHOL 236 03/24/2021    TRIG 133 03/24/2021    HDL 61 03/24/2021    LDLCALC 148 03/24/2021    LABA1C 5.9 03/24/2021        Lab Results   Component Value Date    CHOL 236 (H) 03/24/2021    CHOL 187 07/01/2020    CHOL 251 (H) 07/20/2016     Lab Results   Component Value Date    TRIG 133 03/24/2021    TRIG 87 07/01/2020    TRIG 97 07/20/2016     Lab Results   Component Value Date    HDL 61 03/24/2021    HDL 47 07/01/2020    HDL 51 recommended health maintenance andto schedule as soon as possible if overdue, as this is important in assessing forundiagnosed pathology, especially cancer, as well as protecting against potentially harmful/life threatening disease. Patient and/or guardian verbalizes understandingand agrees with above counseling, assessment and plan. All questions answered.     Ciera Turpin MD

## 2021-08-18 ENCOUNTER — TELEPHONE (OUTPATIENT)
Dept: FAMILY MEDICINE CLINIC | Age: 86
End: 2021-08-18

## 2021-08-18 NOTE — TELEPHONE ENCOUNTER
----- Message from Rai Frey sent at 8/18/2021  1:44 PM EDT -----  Subject: Appointment Request    Reason for Call: Routine Pre-Op    QUESTIONS  Type of Appointment? Established Patient  Reason for appointment request? No appointments available during search  Additional Information for Provider? Pt's daughter (Nannette-not on HIPAA)   called to set up a Pre-op appt. Pt has vision problems and was diagnosed   with a hole in his nacular and is having surgery ASAP in approximately 1   week (8/25). There are no appts available before possible surgery date. Each day pt is losing more and more vision. Please contact pt to set up an   appt. Pre-op paperwork is need for surgical clearance. Pt was also   diagnosed with bullous pemphigoid by the dermatologist. [id number]  ---------------------------------------------------------------------------  --------------  Cally GRAMAJO  What is the best way for the office to contact you? OK to leave message on   voicemail  Preferred Call Back Phone Number? [phone]  ---------------------------------------------------------------------------  --------------  SCRIPT ANSWERS  Relationship to Patient? Other  Representative Name? Nannette-daughter  Additional information verified (besides Name and Date of Birth)? Address  Do you have questions for your provider that need to be answered prior to   scheduling your pre-op appointment? No  Have you been diagnosed with, awaiting test results for, or told that you   are suspected of having COVID-19 (Coronavirus)? (If patient has tested   negative or was tested as a requirement for work, school, or travel and   not based on symptoms, answer no)? No  Do you currently have flu-like symptoms including fever or chills, cough,   shortness of breath, difficulty breathing, or new loss of taste or smell? No  Have you had close contact with someone with COVID-19 in the last 14 days?    No  (Service Expert - click yes below to proceed with [address] As Usual   Scheduling)?  Yes

## 2021-08-19 ENCOUNTER — OFFICE VISIT (OUTPATIENT)
Dept: FAMILY MEDICINE CLINIC | Age: 86
End: 2021-08-19
Payer: MEDICARE

## 2021-08-19 VITALS
HEART RATE: 90 BPM | OXYGEN SATURATION: 94 % | SYSTOLIC BLOOD PRESSURE: 142 MMHG | WEIGHT: 167 LBS | BODY MASS INDEX: 24.66 KG/M2 | TEMPERATURE: 96.8 F | DIASTOLIC BLOOD PRESSURE: 80 MMHG

## 2021-08-19 DIAGNOSIS — Z01.818 PRE-OP EXAM: Primary | ICD-10-CM

## 2021-08-19 DIAGNOSIS — R63.4 WEIGHT LOSS: ICD-10-CM

## 2021-08-19 DIAGNOSIS — L12.0 BULLOUS PEMPHIGOID: ICD-10-CM

## 2021-08-19 DIAGNOSIS — I10 ESSENTIAL HYPERTENSION: ICD-10-CM

## 2021-08-19 DIAGNOSIS — E03.9 HYPOTHYROIDISM, UNSPECIFIED TYPE: ICD-10-CM

## 2021-08-19 DIAGNOSIS — M48.00 SPINAL STENOSIS, UNSPECIFIED SPINAL REGION: ICD-10-CM

## 2021-08-19 DIAGNOSIS — R19.5 MUCOUS IN STOOLS: ICD-10-CM

## 2021-08-19 DIAGNOSIS — F32.A DEPRESSION, UNSPECIFIED DEPRESSION TYPE: ICD-10-CM

## 2021-08-19 DIAGNOSIS — E78.5 HYPERLIPIDEMIA, UNSPECIFIED HYPERLIPIDEMIA TYPE: ICD-10-CM

## 2021-08-19 DIAGNOSIS — R91.1 LUNG NODULE: ICD-10-CM

## 2021-08-19 PROCEDURE — 93000 ELECTROCARDIOGRAM COMPLETE: CPT | Performed by: INTERNAL MEDICINE

## 2021-08-19 PROCEDURE — 99214 OFFICE O/P EST MOD 30 MIN: CPT | Performed by: INTERNAL MEDICINE

## 2021-08-19 RX ORDER — MYCOPHENOLATE MOFETIL 500 MG/1
500 TABLET ORAL 2 TIMES DAILY
Qty: 60 TABLET | Refills: 0 | Status: SHIPPED
Start: 2021-08-19 | End: 2022-03-11

## 2021-08-19 RX ORDER — MYCOPHENOLATE MOFETIL 500 MG/1
TABLET ORAL
COMMUNITY
Start: 2021-08-17 | End: 2021-08-19 | Stop reason: DRUGHIGH

## 2021-08-19 NOTE — PROGRESS NOTES
Subjective:     Chief Complaint   Patient presents with    Pre-op Exam   Patient here for preop clearance  He has seen the retina specialist Dr. Corinne James and going for surgery for macular hole    He has chronic idiopathic rash on the skin for long time recently had a biopsy done by dermatologist diagnosed with bullous pemphigoid  He was started on CellCept  He has followed with dermatology for long time    He is having chronic lower back pain and neurogenic claudication diagnosed with spinal stenosis and osteoarthritis hip they recommended physical therapy he is waiting to start that    Complains of mucus in the stool for the past few months on and off  Occasional blood he thinks on the hemorrhoid  He had a colonoscopy 2018    Follow-up on the cholesterol could not tolerate any statin had severe muscle cramps    He had a history of lung nodule and asthma he follows with pulmonologist in [facility] Dr. Benito Aponte  He was seen last year  He got the refill from them this year        Past Medical History:   Diagnosis Date    Arthritis     Asthma     COPD [facility]ch[facility] obstructive pulmonary disease) ([facility] [address]     Depression     Hiatal hernia     Hx of solitary pulmonary nodule     Hyperglycemia     Hyperlipidemia     Hypertension     Macular degeneration     Neuropathy     Osteoarthritis     Peptic ulcer     PONV (postoperative nausea and vomiting)     Thyroid disease         Social History     Socioeconomic History    Marital status:      Spouse name: Not on file    Number of children: Not on file    Years of education: Not on file    Highest education level: Not on file   Occupational History    Not on file   Tobacco Use    Smoking status: Never Smoker    Smokeless tobacco: Never Used   Vaping Use    Vaping Use: Never used   Substance and Sexual Activity    Alcohol use: Yes     Comment: on occasion    Drug use: Never    Sexual activity: Not Currently   Other Topics Concern    Not on file   Social History Narrative    Not on file     Social Determinants of Health     Financial Resource Strain: Low Risk     Difficulty of Paying Living Expenses: Not hard at all   Food Insecurity: No Food Insecurity    Worried About Running Out of Food in the Last Year: Never true    [address] in the Last Year: Never true   Transportation Needs:     Lack of Transportation (Medical):      Lack of Transportation (Non-Medical):    Physical Activity:     Days of Exercise per Week:     Minutes of Exercise per Session:    Stress:     Feeling of Stress :    Social Connections:     Frequency of Communication with Friends and Family:     Frequency of Social Gatherings with Friends and Family:     Attends [employer] Services:     Active Member of Clubs or Organizations:     Attends Club or Organization Meetings:     Marital Status:    Intimate Partner Violence:     Fear of Current or Ex-Partner:     Emotionally Abused:     Physically Abused:     Sexually Abused:         Past Surgical History:   Procedure Laterality Date    BACK SURGERY      1989    COLONOSCOPY      ENDOSCOPY, COLON, DIAGNOSTIC      EYE SURGERY      viviane cataracts    HERNIA REPAIR      x2  1955, [facility] Right 3/9/2020    LAPAROSCOPIC HERNIA RIGHT  INGUINAL REPAIR WITH MESH ROBOTIC XI performed by Antelmo Boone MD at [facility]          Family History   Problem Relation Age of Onset    Cancer Mother         bladder     Lung Cancer Father     High Blood Pressure Sister     High Blood Pressure Brother     Diabetes Brother         Allergies   Allergen Reactions    Lipitor [Atorvastatin Calcium]      Myalgia       Pcn [Penicillins] Swelling    Prednisone Swelling     Swelling of ankles        ROS  No acute distress  Cardiac: Denies any chest pain or palpitation  Chronic right bundle branch block  Cardiac cath 2012 no significant disease by Dr. Abbi Carrillo  Respiratory: Denies any cough or shortness of hours 20 suppository 1    citalopram (CELEXA) 20 MG tablet Take 1 tablet by mouth daily 90 tablet 1    levothyroxine (SYNTHROID) 25 MCG tablet Take 1 tablet by mouth Daily 90 tablet 1    pantoprazole (PROTONIX) 40 MG tablet Take 1 tablet by mouth daily 90 tablet 1    triamterene-hydroCHLOROthiazide (MAXZIDE-25) 37.5-25 MG per tablet Take 0.5 tablets by mouth daily      tamsulosin (FLOMAX) 0.4 MG capsule Take 1 capsule by mouth daily for 5 days 5 capsule 0    vitamin C (ASCORBIC ACID) 500 MG tablet Take 500 mg by mouth daily      Multiple Vitamins-Minerals (PRESERVISION AREDS 2+MULTI VIT PO) Take 1 tablet by mouth 2 times daily       fluticasone (FLOVENT HFA) 110 MCG/ACT inhaler Inhale 2 puffs into the lungs 2 times daily       multivitamin-iron-minerals-folic acid (CENTRUM) chewable tablet Take 1 tablet by mouth daily       No current facility-administered medications for this visit.         Last 3 BMP  Lab Results   Component Value Date/Time     08/09/2021 03:37 PM     05/27/2021 02:25 PM     03/24/2021 10:55 AM    K 5.0 08/09/2021 03:37 PM    K 4.2 05/27/2021 02:25 PM    K 4.3 03/24/2021 10:55 AM    K 4.2 07/01/2020 04:22 AM    K 4.4 03/04/2020 02:05 PM     08/09/2021 03:37 PM     05/27/2021 02:25 PM     03/24/2021 10:55 AM    CO2 26 08/09/2021 03:37 PM    CO2 24 05/27/2021 02:25 PM    CO2 26 03/24/2021 10:55 AM    BUN 29 (H) 08/09/2021 03:37 PM    BUN 18 05/27/2021 02:25 PM    BUN 24 (H) 03/24/2021 10:55 AM    CREATININE 1.1 08/09/2021 03:37 PM    CREATININE 0.8 05/27/2021 02:25 PM    CREATININE 1.2 03/24/2021 10:55 AM    GLUCOSE 206 (H) 08/09/2021 03:37 PM    GLUCOSE 117 (H) 05/27/2021 02:25 PM    GLUCOSE 91 03/24/2021 10:55 AM    CALCIUM 9.3 08/09/2021 03:37 PM    CALCIUM 9.5 05/27/2021 02:25 PM    CALCIUM 10.1 03/24/2021 10:55 AM       Last 3 CMP:    Lab Results   Component Value Date/Time     08/09/2021 03:37 PM     05/27/2021 02:25 PM     03/24/2021 10:55 AM    K 5.0 08/09/2021 03:37 PM    K 4.2 05/27/2021 02:25 PM    K 4.3 03/24/2021 10:55 AM    K 4.2 07/01/2020 04:22 AM    K 4.4 03/04/2020 02:05 PM     08/09/2021 03:37 PM     05/27/2021 02:25 PM     03/24/2021 10:55 AM    CO2 26 08/09/2021 03:37 PM    CO2 24 05/27/2021 02:25 PM    CO2 26 03/24/2021 10:55 AM    BUN 29 (H) 08/09/2021 03:37 PM    BUN 18 05/27/2021 02:25 PM    BUN 24 (H) 03/24/2021 10:55 AM    CREATININE 1.1 08/09/2021 03:37 PM    CREATININE 0.8 05/27/2021 02:25 PM    CREATININE 1.2 03/24/2021 10:55 AM    GLUCOSE 206 (H) 08/09/2021 03:37 PM    GLUCOSE 117 (H) 05/27/2021 02:25 PM    GLUCOSE 91 03/24/2021 10:55 AM    CALCIUM 9.3 08/09/2021 03:37 PM    CALCIUM 9.5 05/27/2021 02:25 PM    CALCIUM 10.1 03/24/2021 10:55 AM    PROT 6.1 (L) 08/09/2021 03:37 PM    PROT 6.2 (L) 05/27/2021 02:25 PM    PROT 7.0 03/24/2021 10:55 AM    LABALBU 3.7 08/09/2021 03:37 PM    LABALBU 3.9 05/27/2021 02:25 PM    LABALBU 4.4 03/24/2021 10:55 AM    BILITOT 0.3 08/09/2021 03:37 PM    BILITOT 0.5 05/27/2021 02:25 PM    BILITOT 0.6 03/24/2021 10:55 AM    ALKPHOS 71 08/09/2021 03:37 PM    ALKPHOS 75 05/27/2021 02:25 PM    ALKPHOS 70 03/24/2021 10:55 AM    AST 18 08/09/2021 03:37 PM    AST 21 05/27/2021 02:25 PM    AST 22 03/24/2021 10:55 AM    ALT 18 08/09/2021 03:37 PM    ALT 16 05/27/2021 02:25 PM    ALT 13 03/24/2021 10:55 AM        CBC:   Lab Results   Component Value Date/Time    WBC 6.8 08/09/2021 03:37 PM    RBC 4.08 08/09/2021 03:37 PM    HGB 13.0 08/09/2021 03:37 PM    HCT 41.4 08/09/2021 03:37 PM    .5 (H) 08/09/2021 03:37 PM    MCH 31.9 08/09/2021 03:37 PM    MCHC 31.4 (L) 08/09/2021 03:37 PM    RDW 13.8 08/09/2021 03:37 PM     08/09/2021 03:37 PM    MPV 10.8 08/09/2021 03:37 PM       A1C:  Lab Results   Component Value Date/Time    LABA1C 5.9 (H) 03/24/2021 10:55 AM       Lipid panel:  Lab Results   Component Value Date    CHOL 236 03/24/2021    CHOL 187 07/01/2020 CHOL 251 07/20/2016    TRIG 133 03/24/2021    TRIG 87 07/01/2020    TRIG 97 07/20/2016    HDL 61 03/24/2021    HDL 47 07/01/2020    HDL 51 05/21/2020        Lab Results   Component Value Date/Time    PROT 6.1 (L) 08/09/2021 03:37 PM    PROT 6.2 (L) 05/27/2021 02:25 PM    PROT 7.0 03/24/2021 10:55 AM       No results found for: MG      Assessment. Lawrence Bailey was seen today for pre-op exam.    Diagnoses and all orders for this visit:    Pre-op exam  -     EKG 12 lead; Future  -     EKG 12 lead    Weight loss  -     XR CHEST (2 VW); Future  -     US LIVER;  Future  -     Fred Martinez MD, [facility])    Mucous in stools  -     [facility], [facility]    Bullous pemphigoid    Hyperlipidemia, unspecified hyperlipidemia type    Spinal stenosis, unspecified spinal region    Essential hypertension    Hypothyroidism, unspecified type    Depression, unspecified depression type    Other orders  -     mycophenolate (CELLCEPT) 500 MG tablet; 1 tablet 2 times daily       Patient Active Problem List   Diagnosis    Right inguinal hernia    Dizziness    Essential hypertension    Gastroesophageal reflux disease without esophagitis    Hypothyroidism    Spinal stenosis    Hyperlipidemia    Bullous pemphigoid    Depression       Plan: EKG right bundle branch block no change from previous EKG    Preop clearance no contraindication for surgery  Form was filled and signed    Hyperlipidemia he could not tolerate any statin had severe muscle cramps with Lipitor and pravastatin is declining to start any new medication    Spinal stenosis seen in [facility] spine surgeon they recommended physical therapy he is planning to start that    Essential hypertension BP slightly elevated today he was very anxious monitor continue Zestril 10 mg for now Maxide 25 daily  Hypothyroidism TSH slightly elevated  T4 was normal, patient already lost a little weight I did not increase the dose will monitor TSH T4      Depression controlled on Celexa no side effect denies any suicidal ideation    Bullous pemphigoid patient on CellCept now as per dermatologist    History of asthma very well controlled denies any symptoms    History of lung nodule was stable repeat chest x-ray today  And follow-up with pulmonologist in [place] patient will make his appointment    Mucus in the stool and hemorrhoids refer to Dr. Chen Mcgraw to rule out any colonic pathology    Patient was counseled    Repeat blood work in 3 months    Return in about 3 months (around 11/19/2021).        Sudhir Lloyd MD  12:55 PM  8/19/2021     [place]

## 2021-08-25 ENCOUNTER — HOSPITAL ENCOUNTER (OUTPATIENT)
Age: 86
Discharge: HOME | End: 2021-08-27
Payer: MEDICARE

## 2021-08-25 ENCOUNTER — HOSPITAL ENCOUNTER (OUTPATIENT)
Dept: ULTRASOUND IMAGING | Age: 86
Discharge: HOME | End: 2021-08-25
Payer: MEDICARE

## 2021-08-25 ENCOUNTER — HOSPITAL ENCOUNTER (OUTPATIENT)
Dept: GENERAL RADIOLOGY | Age: 86
Discharge: HOME | End: 2021-08-27
Payer: MEDICARE

## 2021-08-25 DIAGNOSIS — R63.4 WEIGHT LOSS: ICD-10-CM

## 2021-08-25 PROCEDURE — 71046 X-RAY EXAM CHEST 2 VIEWS: CPT

## 2021-08-25 PROCEDURE — 76705 ECHO EXAM OF ABDOMEN: CPT

## 2021-08-31 ENCOUNTER — TELEPHONE (OUTPATIENT)
Dept: FAMILY MEDICINE CLINIC | Age: 86
End: 2021-08-31

## 2021-08-31 NOTE — TELEPHONE ENCOUNTER
Ultrasound liver and chest x-ray report discussed with the patient, patient denies any abdominal pain patient had asymptomatic cholelithiasis

## 2021-08-31 NOTE — TELEPHONE ENCOUNTER
Patient called for result of Liver US done on 8.25.2021.   Please call [phone]    Last seen 8/19/2021  Next appt 11/18/2021

## 2021-09-02 RX ORDER — LEVOTHYROXINE SODIUM 0.03 MG/1
25 TABLET ORAL DAILY
Qty: 90 TABLET | Refills: 1 | Status: SHIPPED
Start: 2021-09-02 | End: 2022-01-07 | Stop reason: SDUPTHER

## 2021-09-08 RX ORDER — CITALOPRAM 20 MG/1
TABLET ORAL
Qty: 90 TABLET | Refills: 0 | Status: SHIPPED
Start: 2021-09-08 | End: 2022-01-07 | Stop reason: SDUPTHER

## 2021-09-09 ENCOUNTER — TELEPHONE (OUTPATIENT)
Dept: FAMILY MEDICINE CLINIC | Age: 86
End: 2021-09-09

## 2021-09-09 NOTE — TELEPHONE ENCOUNTER
----- Message from Anton Bravo sent at 9/8/2021  5:26 PM EDT -----  Subject: Message to Provider    QUESTIONS  Information for Provider? Patient kelly England called regarding   medication not found on list :triamterene (patient is taking . 5 tablet   every three days currently) Is this the correct manner for this to be   taken ? Please advise daughter Alton Grajeda whom is on HIPAA) at [phone]   due to patient recovering from surgery   ---------------------------------------------------------------------------  --------------  CALL BACK INFO  What is the best way for the office to contact you? OK to leave message on   voicemail  Preferred Call Back Phone Number? [phone]  ---------------------------------------------------------------------------  --------------  SCRIPT ANSWERS  Relationship to Patient? Other  Representative Name? kelly kumari  Is the Representative on the appropriate HIPAA document in Epic?  Yes

## 2021-09-13 ENCOUNTER — TELEPHONE (OUTPATIENT)
Dept: FAMILY MEDICINE CLINIC | Age: 86
End: 2021-09-13

## 2021-09-13 NOTE — TELEPHONE ENCOUNTER
----- Message from Epifanio Pereyra sent at 9/13/2021 11:25 AM EDT -----  Subject: Appointment Request    Reason for Call: Routine Existing Condition Follow Up    QUESTIONS  Type of Appointment? Established Patient  Reason for appointment request? No appointments available during search  Additional Information for Provider? Pt daughter Olivia De La O called in and   stated talk to provider on 9/8/21 and provider stated needs a follow up   for sometime in the week of 9/27/21 and there was no appointments until   11/18/21 and provider wants pt seen before then to talk about medication. Screened green  ---------------------------------------------------------------------------  --------------  CALL BACK INFO  What is the best way for the office to contact you? OK to leave message on   voicemail  Preferred Call Back Phone Number? [phone]  ---------------------------------------------------------------------------  --------------  SCRIPT ANSWERS  Relationship to Patient? Other  Representative Name? quoc  Additional information verified (besides Name and Date of Birth)? Address  (Is the patient requesting to be seen urgently for their symptoms?)? No  Is this follow up request related to routine Diabetes Management? No  Are you having any new concerns about your existing condition? No  Have you been diagnosed with, awaiting test results for, or told that you   are suspected of having COVID-19 (Coronavirus)? (If patient has tested   negative or was tested as a requirement for work, school, or travel and   not based on symptoms, answer no)? No  Within the past two weeks have you developed any of the following symptoms   (answer no if symptoms have been present longer than 2 weeks or began   more than 2 weeks ago)?  Fever or Chills, Cough, Shortness of breath or   difficulty breathing, Loss of taste or smell, Sore throat, Nasal   congestion, Sneezing or runny nose, Fatigue or generalized body aches   (answer no if pain is specific to a body part e.g. back pain), Diarrhea,   Headache? No  Have you had close contact with someone with COVID-19 in the last 14 days? No  (Service Expert - click yes below to proceed with [facility] As Usual   Scheduling)?  Yes

## 2021-09-21 ENCOUNTER — TELEPHONE (OUTPATIENT)
Dept: FAMILY MEDICINE CLINIC | Age: 86
End: 2021-09-21

## 2021-09-21 NOTE — TELEPHONE ENCOUNTER
Patient called stating his Lisinopril 10 mg was increased to take 2 daily when he had eye SX. Patient is asking if he is to continue at this dosage and if so, will need new RX since he is nearly out. Please advise.     Last seen 8/19/2021  Next appt 9/29/2021  [facility]

## 2021-09-23 RX ORDER — LISINOPRIL 10 MG/1
10 TABLET ORAL 2 TIMES DAILY
Qty: 180 TABLET | Refills: 1 | Status: SHIPPED
Start: 2021-09-23 | End: 2021-09-29 | Stop reason: SDUPTHER

## 2021-09-29 ENCOUNTER — OFFICE VISIT (OUTPATIENT)
Dept: FAMILY MEDICINE CLINIC | Age: 86
End: 2021-09-29
Payer: MEDICARE

## 2021-09-29 VITALS
WEIGHT: 167 LBS | BODY MASS INDEX: 24.66 KG/M2 | OXYGEN SATURATION: 95 % | DIASTOLIC BLOOD PRESSURE: 70 MMHG | TEMPERATURE: 97.7 F | SYSTOLIC BLOOD PRESSURE: 120 MMHG | HEART RATE: 85 BPM

## 2021-09-29 DIAGNOSIS — I10 ESSENTIAL HYPERTENSION: Primary | ICD-10-CM

## 2021-09-29 DIAGNOSIS — L12.0 BULLOUS PEMPHIGOID: ICD-10-CM

## 2021-09-29 DIAGNOSIS — K21.9 GASTROESOPHAGEAL REFLUX DISEASE WITHOUT ESOPHAGITIS: ICD-10-CM

## 2021-09-29 DIAGNOSIS — E03.9 HYPOTHYROIDISM, UNSPECIFIED TYPE: ICD-10-CM

## 2021-09-29 PROCEDURE — 99213 OFFICE O/P EST LOW 20 MIN: CPT | Performed by: INTERNAL MEDICINE

## 2021-09-29 RX ORDER — LISINOPRIL 10 MG/1
10 TABLET ORAL 2 TIMES DAILY
Qty: 180 TABLET | Refills: 1 | Status: SHIPPED
Start: 2021-09-29 | End: 2022-01-07 | Stop reason: SDUPTHER

## 2021-09-29 RX ORDER — TIMOLOL MALEATE 5 MG/ML
SOLUTION/ DROPS OPHTHALMIC
COMMUNITY
Start: 2021-09-14

## 2021-09-29 RX ORDER — BRIMONIDINE TARTRATE 0.1 %
DROPS OPHTHALMIC (EYE)
COMMUNITY
Start: 2021-09-14 | End: 2022-03-11

## 2021-09-29 RX ORDER — PREDNISOLONE ACETATE 10 MG/ML
SUSPENSION/ DROPS OPHTHALMIC
COMMUNITY
Start: 2021-09-19

## 2021-09-29 NOTE — PROGRESS NOTES
Subjective:     Chief Complaint   Patient presents with    Other     follow from surgery   Patient follow-up on hypertension, his medication was increased to doing better    He had bullous pemphigoid he is being treated with CellCept which has really helped    Occasional heartburns no dysphagia  His bowel movements are normal  He was supposed to see Dr. Joe Greco he will make an appointment soon,  He is taking care of his eyes,  He is not losing any weight  Denies any cough,  Has a good appetite, no dysphagia    Past Medical History:   Diagnosis Date    Arthritis     Asthma     COPD (chronic obstructive pulmonary disease) ([facility] [address]     Depression     Hiatal hernia     Hx of solitary pulmonary nodule     Hyperglycemia     Hyperlipidemia     Hypertension     Macular degeneration     Neuropathy     Osteoarthritis     Peptic ulcer     PONV (postoperative nausea and vomiting)     Thyroid disease         Social History     Socioeconomic History    Marital status:      Spouse name: Not on file    Number of children: Not on file    Years of education: Not on file    Highest education level: Not on file   Occupational History    Not on file   Tobacco Use    Smoking status: Never Smoker    Smokeless tobacco: Never Used   Vaping Use    Vaping Use: Never used   Substance and Sexual Activity    Alcohol use: Yes     Comment: on occasion    Drug use: Never    Sexual activity: Not Currently   Other Topics Concern    Not on file   Social History Narrative    Not on file     Social Determinants of Health     Financial Resource Strain: Low Risk     Difficulty of Paying Living Expenses: Not hard at all   Food Insecurity: No Food Insecurity    Worried About [address] in the Last Year: Never true    [facility] in the Last Year: Never true   Transportation Needs:     Lack of Transportation (Medical):      Lack of Transportation (Non-Medical):    Physical Activity:     Days of Exercise per Week:     Minutes of Exercise per Session:    Stress:     Feeling of Stress :    Social Connections:     Frequency of Communication with Friends and Family:     Frequency of Social Gatherings with Friends and Family:     Attends Anabaptism Services:     Active Member of Clubs or Organizations:     Attends Club or Organization Meetings:     Marital Status:    Intimate Partner Violence:     Fear of Current or Ex-Partner:     Emotionally Abused:     Physically Abused:     Sexually Abused:         Past Surgical History:   Procedure Laterality Date    BACK SURGERY      1989    COLONOSCOPY      ENDOSCOPY, COLON, DIAGNOSTIC      EYE SURGERY      viviane cataracts    HERNIA REPAIR      x2  1955, [place] Right 3/9/2020    LAPAROSCOPIC HERNIA RIGHT  INGUINAL REPAIR WITH MESH ROBOTIC XI performed by Tatiana Fish MD at [facility]          Family History   Problem Relation Age of Onset    Cancer Mother         bladder     Lung Cancer Father     High Blood Pressure Sister     High Blood Pressure Brother     Diabetes Brother         Allergies   Allergen Reactions    Lipitor [Atorvastatin Calcium]      Myalgia       Pcn [Penicillins] Swelling    Prednisone Swelling     Swelling of ankles        ROS  No acute distress  Cardiac: Denies any chest pain or palpitation  Denies any chest pain or angina  Cardiac cath 2012 no significant disease Dr. Silvestre Acuna right bundle branch block  Respiratory: Denies any cough or shortness of breath  Chest x-ray August 2021 -ve  History of lung nodule 7 mm left upper lobe no change from June 2014, October 2014, February 2015, December 2016  Seen by [facility] pulmonologist [place]  GI: No abdominal pain.  Denies any nausea vomiting or diarrhea  Colonoscopy 2018 with Dr. Vaughn Coon  Ultrasound right upper quadrant no significant pathology  Has gallstones denies any abdominal pain  : Denies any dysuria frequency or hematuria  Neuro: No headache or dizziness  Endocrine: No diabetes  Skin: normal  No recent weight gain or weight loss  Denies any change in vision    Objective:    /70   Pulse 85   Temp 97.7 °F (36.5 °C)   Wt 167 lb (75.8 kg)   SpO2 95%   BMI 24.66 kg/m²     Constitutional: Alert awake and oriented  Eyes: Pupils equal bilaterally. Extraocular muscles intact  Neck: no JVD adenopathy no bruit  Heart:  RRR, no murmurs, gallops, or rubs.   Lungs:    no wheeze, rales or rhonchi  Abd: bowel sounds present, nontender, nondistended, no masses  Extrem:  No clubbing, cyanosis, or edema  Neuro: AAOx3,No Focal deficit  Psychological: no depression or anxiety       Current Outpatient Medications   Medication Sig Dispense Refill    ALPHAGAN P 0.1 % SOLN INSTILL 1 DROP INTO RIGHT EYE TWICE DAILY      prednisoLONE acetate (PRED FORTE) 1 % ophthalmic suspension INSTILL 1 DROP INTO RIGHT EYE 4 TIMES DAILY      timolol (TIMOPTIC) 0.5 % ophthalmic solution INSTILL 1 DROP INTO RIGHT EYE TWICE DAILY      lisinopril (PRINIVIL;ZESTRIL) 10 MG tablet Take 1 tablet by mouth 2 times daily 180 tablet 1    citalopram (CELEXA) 20 MG tablet Take 1 tablet by mouth once daily 90 tablet 0    levothyroxine (SYNTHROID) 25 MCG tablet Take 1 tablet by mouth Daily 90 tablet 1    mycophenolate (CELLCEPT) 500 MG tablet 1 tablet 2 times daily 60 tablet 0    montelukast (SINGULAIR) 10 MG tablet TAKE 1 TABLET BY MOUTH ONCE DAILY      tiZANidine (ZANAFLEX) 4 MG tablet Take 1 tablet by mouth nightly as needed (back pain) 30 tablet 0    Influenza Vac Split High-Dose (FLUZONE HIGH-DOSE) 0.5 ML FAHAD injection Fluzone High-Dose 2019-20 (PF) 180 mcg/0.5 mL intramuscular syringe      ketoconazole (NIZORAL) 2 % cream       hydrocortisone (ANUSOL-HC) 25 MG suppository Place 1 suppository rectally every 12 hours 20 suppository 1    pantoprazole (PROTONIX) 40 MG tablet Take 1 tablet by mouth daily 90 tablet 1    triamterene-hydroCHLOROthiazide (MAXZIDE-25) 37.5-25 MG per tablet Take 0.5 tablets by mouth daily      tamsulosin (FLOMAX) 0.4 MG capsule Take 1 capsule by mouth daily for 5 days 5 capsule 0    vitamin C (ASCORBIC ACID) 500 MG tablet Take 500 mg by mouth daily      Multiple Vitamins-Minerals (PRESERVISION AREDS 2+MULTI VIT PO) Take 1 tablet by mouth 2 times daily       fluticasone (FLOVENT HFA) 110 MCG/ACT inhaler Inhale 2 puffs into the lungs 2 times daily       multivitamin-iron-minerals-folic acid (CENTRUM) chewable tablet Take 1 tablet by mouth daily       No current facility-administered medications for this visit.         Last 3 BMP  Lab Results   Component Value Date/Time     09/20/2021 03:00 PM     08/09/2021 03:37 PM     05/27/2021 02:25 PM    K 5.1 (H) 09/20/2021 03:00 PM    K 5.0 08/09/2021 03:37 PM    K 4.2 05/27/2021 02:25 PM    K 4.3 03/24/2021 10:55 AM    K 4.4 03/04/2020 02:05 PM     09/20/2021 03:00 PM     08/09/2021 03:37 PM     05/27/2021 02:25 PM    CO2 27 09/20/2021 03:00 PM    CO2 26 08/09/2021 03:37 PM    CO2 24 05/27/2021 02:25 PM    BUN 34 (H) 09/20/2021 03:00 PM    BUN 29 (H) 08/09/2021 03:37 PM    BUN 18 05/27/2021 02:25 PM    CREATININE 1.0 09/20/2021 03:00 PM    CREATININE 1.1 08/09/2021 03:37 PM    CREATININE 0.8 05/27/2021 02:25 PM    GLUCOSE 118 (H) 09/20/2021 03:00 PM    GLUCOSE 206 (H) 08/09/2021 03:37 PM    GLUCOSE 117 (H) 05/27/2021 02:25 PM    CALCIUM 9.8 09/20/2021 03:00 PM    CALCIUM 9.3 08/09/2021 03:37 PM    CALCIUM 9.5 05/27/2021 02:25 PM       Last 3 CMP:    Lab Results   Component Value Date/Time     09/20/2021 03:00 PM     08/09/2021 03:37 PM     05/27/2021 02:25 PM    K 5.1 (H) 09/20/2021 03:00 PM    K 5.0 08/09/2021 03:37 PM    K 4.2 05/27/2021 02:25 PM    K 4.3 03/24/2021 10:55 AM    K 4.4 03/04/2020 02:05 PM     09/20/2021 03:00 PM     08/09/2021 03:37 PM     05/27/2021 02:25 PM    CO2 27 09/20/2021 03:00 PM    CO2 26 08/09/2021 03:37 PM    CO2 24 05/27/2021 02:25 PM    BUN 34 (H) 09/20/2021 03:00 PM    BUN 29 (H) 08/09/2021 03:37 PM    BUN 18 05/27/2021 02:25 PM    CREATININE 1.0 09/20/2021 03:00 PM    CREATININE 1.1 08/09/2021 03:37 PM    CREATININE 0.8 05/27/2021 02:25 PM    GLUCOSE 118 (H) 09/20/2021 03:00 PM    GLUCOSE 206 (H) 08/09/2021 03:37 PM    GLUCOSE 117 (H) 05/27/2021 02:25 PM    CALCIUM 9.8 09/20/2021 03:00 PM    CALCIUM 9.3 08/09/2021 03:37 PM    CALCIUM 9.5 05/27/2021 02:25 PM    PROT 5.6 (L) 09/20/2021 03:00 PM    PROT 6.1 (L) 08/09/2021 03:37 PM    PROT 6.2 (L) 05/27/2021 02:25 PM    LABALBU 3.8 09/20/2021 03:00 PM    LABALBU 3.7 08/09/2021 03:37 PM    LABALBU 3.9 05/27/2021 02:25 PM    BILITOT 0.4 09/20/2021 03:00 PM    BILITOT 0.3 08/09/2021 03:37 PM    BILITOT 0.5 05/27/2021 02:25 PM    ALKPHOS 89 09/20/2021 03:00 PM    ALKPHOS 71 08/09/2021 03:37 PM    ALKPHOS 75 05/27/2021 02:25 PM    AST 23 09/20/2021 03:00 PM    AST 18 08/09/2021 03:37 PM    AST 21 05/27/2021 02:25 PM    ALT 16 09/20/2021 03:00 PM    ALT 18 08/09/2021 03:37 PM    ALT 16 05/27/2021 02:25 PM        CBC:   Lab Results   Component Value Date/Time    WBC 8.9 09/20/2021 03:00 PM    RBC 4.07 09/20/2021 03:00 PM    HGB 13.0 09/20/2021 03:00 PM    HCT 41.2 09/20/2021 03:00 PM    .2 (H) 09/20/2021 03:00 PM    MCH 31.9 09/20/2021 03:00 PM    MCHC 31.6 (L) 09/20/2021 03:00 PM    RDW 13.7 09/20/2021 03:00 PM     09/20/2021 03:00 PM    MPV 11.0 09/20/2021 03:00 PM       A1C:  Lab Results   Component Value Date/Time    LABA1C 5.9 (H) 03/24/2021 10:55 AM       Lipid panel:  Lab Results   Component Value Date    CHOL 236 03/24/2021    CHOL 187 07/01/2020    CHOL 251 07/20/2016    TRIG 133 03/24/2021    TRIG 87 07/01/2020    TRIG 97 07/20/2016    HDL 61 03/24/2021    HDL 47 07/01/2020    HDL 51 05/21/2020        Lab Results   Component Value Date/Time    PROT 5.6 (L) 09/20/2021 03:00 PM    PROT 6.1 (L) 08/09/2021 03:37 PM    PROT 6.2 (L) 05/27/2021 02:25 PM       No results found for: MG      Assessment. Lashay Zazueta was seen today for other. Diagnoses and all orders for this visit:    Essential hypertension    Bullous pemphigoid    Hypothyroidism, unspecified type    Gastroesophageal reflux disease without esophagitis    Other orders  -     lisinopril (PRINIVIL;ZESTRIL) 10 MG tablet; Take 1 tablet by mouth 2 times daily       Patient Active Problem List   Diagnosis    Right inguinal hernia    Dizziness    Essential hypertension    Gastroesophageal reflux disease without esophagitis    Hypothyroidism    Spinal stenosis    Hyperlipidemia    Bullous pemphigoid    Depression    Lung nodule       Plan: Hypertension controlled continue lisinopril 10 mg twice daily and Maxide 25 mg    Hypothyroid continue Synthroid 25 mcg    GE reflux disease no dysphagia Protonix 40 mg daily see GI he will make his own appointment    Bullous pemphigoid doing much better  With CellCept    He will make his appointment the gastroenterologist Dr. Bess Herrera to follow-up with the pulmonologist in [facility]  Patient will make his own appointment    Return in about 3 months (around 12/29/2021).        Hilaria Elizondo MD  3:27 PM  9/29/2021     [place]

## 2021-12-02 RX ORDER — TRIAMTERENE AND HYDROCHLOROTHIAZIDE 37.5; 25 MG/1; MG/1
0.5 TABLET ORAL DAILY
Qty: 90 TABLET | Refills: 1 | Status: SHIPPED
Start: 2021-12-02 | End: 2022-01-07 | Stop reason: SDUPTHER

## 2021-12-29 ENCOUNTER — TELEPHONE (OUTPATIENT)
Dept: FAMILY MEDICINE CLINIC | Age: 86
End: 2021-12-29

## 2021-12-29 NOTE — TELEPHONE ENCOUNTER
Patient called stating he has an appt today at 2:00 pm, but he had direct contact with his Kareen Aguirre on [place] [employer] and his Grandson tested positive for COVID on 12/26/21. Patient denies any symptoms. I spoke w/Dr. Oneil Monsalve and was advised to offer VV Appt. Patient stated he is unable to do a VV Appt since he does not have the electronic devices to do so. Appt was cancelled. Msg sent to MA to review schedules and RS appt if able. Patient stated he does have an answering machine and OK to leave msg.       Last seen 9/29/2021  Next appt Visit date not found

## 2022-01-07 ENCOUNTER — OFFICE VISIT (OUTPATIENT)
Dept: FAMILY MEDICINE CLINIC | Age: 87
End: 2022-01-07
Payer: MEDICARE

## 2022-01-07 VITALS
HEART RATE: 88 BPM | BODY MASS INDEX: 24.96 KG/M2 | TEMPERATURE: 97 F | DIASTOLIC BLOOD PRESSURE: 82 MMHG | SYSTOLIC BLOOD PRESSURE: 138 MMHG | OXYGEN SATURATION: 97 % | WEIGHT: 169 LBS

## 2022-01-07 DIAGNOSIS — F32.A DEPRESSION, UNSPECIFIED DEPRESSION TYPE: ICD-10-CM

## 2022-01-07 DIAGNOSIS — K21.9 GASTROESOPHAGEAL REFLUX DISEASE WITHOUT ESOPHAGITIS: ICD-10-CM

## 2022-01-07 DIAGNOSIS — Z23 NEED FOR IMMUNIZATION AGAINST INFLUENZA: ICD-10-CM

## 2022-01-07 DIAGNOSIS — E03.9 HYPOTHYROIDISM, UNSPECIFIED TYPE: ICD-10-CM

## 2022-01-07 DIAGNOSIS — L12.0 BULLOUS PEMPHIGOID: ICD-10-CM

## 2022-01-07 DIAGNOSIS — M48.00 SPINAL STENOSIS, UNSPECIFIED SPINAL REGION: ICD-10-CM

## 2022-01-07 DIAGNOSIS — L50.9 URTICARIA: ICD-10-CM

## 2022-01-07 DIAGNOSIS — R60.9 PERIPHERAL EDEMA: Primary | ICD-10-CM

## 2022-01-07 DIAGNOSIS — I10 ESSENTIAL HYPERTENSION: ICD-10-CM

## 2022-01-07 PROCEDURE — 90694 VACC AIIV4 NO PRSRV 0.5ML IM: CPT | Performed by: INTERNAL MEDICINE

## 2022-01-07 PROCEDURE — G0008 ADMIN INFLUENZA VIRUS VAC: HCPCS | Performed by: INTERNAL MEDICINE

## 2022-01-07 PROCEDURE — 99214 OFFICE O/P EST MOD 30 MIN: CPT | Performed by: INTERNAL MEDICINE

## 2022-01-07 RX ORDER — TIZANIDINE 4 MG/1
4 TABLET ORAL NIGHTLY PRN
Qty: 30 TABLET | Refills: 0 | Status: SHIPPED | OUTPATIENT
Start: 2022-01-07 | End: 2022-03-11

## 2022-01-07 RX ORDER — TRIAMTERENE AND HYDROCHLOROTHIAZIDE 37.5; 25 MG/1; MG/1
0.5 TABLET ORAL DAILY
Qty: 90 TABLET | Refills: 1 | Status: SHIPPED | OUTPATIENT
Start: 2022-01-07 | End: 2022-03-11

## 2022-01-07 RX ORDER — LEVOTHYROXINE SODIUM 0.03 MG/1
25 TABLET ORAL DAILY
Qty: 90 TABLET | Refills: 1 | Status: SHIPPED | OUTPATIENT
Start: 2022-01-07 | End: 2022-09-15 | Stop reason: SDUPTHER

## 2022-01-07 RX ORDER — PANTOPRAZOLE SODIUM 40 MG/1
40 TABLET, DELAYED RELEASE ORAL DAILY
Qty: 90 TABLET | Refills: 1 | Status: SHIPPED | OUTPATIENT
Start: 2022-01-07 | End: 2022-03-11

## 2022-01-07 RX ORDER — LISINOPRIL 10 MG/1
10 TABLET ORAL 2 TIMES DAILY
Qty: 180 TABLET | Refills: 1 | Status: SHIPPED | OUTPATIENT
Start: 2022-01-07 | End: 2022-10-03 | Stop reason: SDUPTHER

## 2022-01-07 RX ORDER — CITALOPRAM 20 MG/1
TABLET ORAL
Qty: 90 TABLET | Refills: 1 | Status: SHIPPED | OUTPATIENT
Start: 2022-01-07

## 2022-01-07 NOTE — PROGRESS NOTES
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 Stephen Allison dermatologist, tried CellCept did not help, now he is on prednisone 10 mg daily    Feels depressed but improving he is not losing any weight,  Ultrasound right upper quadrant and chest x-ray was negative recently,      He was referred to Dr. Oley Dakin gastroenterologist patient did not keep his appointment, he declined to see any GI at this time,    Past Medical History:   Diagnosis Date    Arthritis     Asthma     COPD (chronic obstructive pulmonary disease) ([facility] [address]     Depression     Hiatal hernia     Hx of solitary pulmonary nodule     Hyperglycemia     Hyperlipidemia     Hypertension     Macular degeneration     Neuropathy     Osteoarthritis     Peptic ulcer     PONV (postoperative nausea and vomiting)     Thyroid disease         Social History     Socioeconomic History    Marital status:       Spouse name: Not on file    Number of children: Not on file    Years of education: Not on file    Highest education level: Not on file   Occupational History    Not on file   Tobacco Use    Smoking status: Never Smoker    Smokeless tobacco: Never Used   Vaping Use    Vaping Use: Never used   Substance and Sexual Activity    Alcohol use: Yes     Comment: on occasion    Drug use: Never    Sexual activity: Not Currently   Other Topics Concern    Not on file   Social History Narrative    Not on file     Social Determinants of Health Financial Resource Strain: Low Risk     Difficulty of Paying Living Expenses: Not hard at all   Food Insecurity: No Food Insecurity    Worried About Running Out of Food in the Last Year: Never true    Bj of Food in the Last Year: Never true   Transportation Needs:     Lack of Transportation (Medical): Not on file    Lack of Transportation (Non-Medical):  Not on file   Physical Activity:     Days of Exercise per Week: Not on file    Minutes of Exercise per Session: Not on file   Stress:     Feeling of Stress : Not on file   Social Connections:     Frequency of Communication with Friends and Family: Not on file    Frequency of Social Gatherings with Friends and Family: Not on file    Attends Restorationism Services: Not on file    Active Member of [address] [place] [address] or Organizations: Not on file    Attends Club or Organization Meetings: Not on file    Marital Status: Not on file   Intimate Partner Violence:     Fear of Current or Ex-Partner: Not on file    Emotionally Abused: Not on file    Physically Abused: Not on file    Sexually Abused: Not on file   Housing Stability:     Unable to Pay for Housing in the Last Year: Not on file    Number of [place] in the Last Year: Not on file    Unstable Housing in the Last Year: Not on file        Past Surgical History:   Procedure Laterality Date    BACK SURGERY      1989    COLONOSCOPY      ENDOSCOPY, COLON, DIAGNOSTIC      EYE SURGERY      viviane cataracts   [address] [facility]      [facility]    LAPAROSCOPIC HERNIA RIGHT  [address] [facility] XI performed by Jaime Osuna MD at [address] [address]          Family History   Problem Relation Age of Onset    Cancer Mother         bladder     Lung Cancer Father     High Blood Pressure Sister     High Blood Pressure Brother     Diabetes Brother         Allergies   Allergen Reactions    Lipitor [Atorvastatin Calcium]      Myalgia       Pcn [Penicillins] Swelling    Prednisone Swelling     Swelling of ankles        ROS  No acute distress  Cardiac: Denies any chest pain or palpitation  Denies any chest pain or angina  Cardiac cath 2012 no significant disease Dr. Alana Carson right bundle branch block  Respiratory: Denies any cough or shortness of breath  Chest x-ray August 2021 -ve  History of lung nodule 7 mm left upper lobe no change from June 2014, October 2014, February 2015, December 2016  Seen by [address] [place] [address]    Chest x-ray August 2021 negative    GI: No abdominal pain. Denies any nausea vomiting or diarrhea  Colonoscopy 2018 with Dr. Natalie Thurston  Ultrasound right upper quadrant no significant pathology  Has gallstones denies any abdominal pain  : Denies any dysuria frequency or hematuria  Neuro: No headache or dizziness  Endocrine: No diabetes  Skin: normal  No recent weight gain or weight loss  Vision right eye poor, left eye vision has glaucoma being followed by ophthalmology    Objective:    /82   Pulse 88   Temp 97 °F (36.1 °C)   Wt 169 lb (76.7 kg)   SpO2 97%   BMI 24.96 kg/m²     Constitutional: Alert awake and oriented  Eyes: Pupils equal bilaterally. Extraocular muscles intact  Neck: no JVD adenopathy no bruit  Heart:  RRR, no murmurs, gallops, or rubs.   Lungs:    no wheeze, rales or rhonchi  Abd: bowel sounds present, nontender, nondistended, no masses  Extrem:  No clubbing, cyanosis, or edema  Neuro: AAOx3,No Focal deficit  Psychological: no depression or anxiety   Skin there is a dry rash skin is very dry on the back there are some papular lesions    Gait is steady with a walker no spinal tenderness  Straight leg raising is positive    Current Outpatient Medications   Medication Sig Dispense Refill    citalopram (CELEXA) 20 MG tablet Take 1 tablet by mouth once daily 90 tablet 1    levothyroxine (SYNTHROID) 25 MCG tablet Take 1 tablet by mouth Daily 90 tablet 1    lisinopril (PRINIVIL;ZESTRIL) 10 MG tablet Take 1 tablet by mouth 2 times daily 180 tablet 1    pantoprazole (PROTONIX) 40 MG tablet Take 1 tablet by mouth daily 90 tablet 1    triamterene-hydroCHLOROthiazide (MAXZIDE-25) 37.5-25 MG per tablet Take 0.5 tablets by mouth daily 90 tablet 1    tiZANidine (ZANAFLEX) 4 MG tablet Take 1 tablet by mouth nightly as needed (back pain) 30 tablet 0    Handicap Placard MISC Until 1/7/2025 1 each 0    ALPHAGAN P 0.1 % SOLN INSTILL 1 DROP INTO RIGHT EYE TWICE DAILY      prednisoLONE acetate (PRED FORTE) 1 % ophthalmic suspension INSTILL 1 DROP INTO RIGHT EYE 4 TIMES DAILY      timolol (TIMOPTIC) 0.5 % ophthalmic solution INSTILL 1 DROP INTO RIGHT EYE TWICE DAILY      mycophenolate (CELLCEPT) 500 MG tablet 1 tablet 2 times daily 60 tablet 0    montelukast (SINGULAIR) 10 MG tablet TAKE 1 TABLET BY MOUTH ONCE DAILY      hydrocortisone (ANUSOL-HC) 25 MG suppository Place 1 suppository rectally every 12 hours 20 suppository 1    vitamin C (ASCORBIC ACID) 500 MG tablet Take 500 mg by mouth daily      Multiple Vitamins-Minerals (PRESERVISION AREDS 2+MULTI VIT PO) Take 1 tablet by mouth 2 times daily       fluticasone (FLOVENT HFA) 110 MCG/ACT inhaler Inhale 2 puffs into the lungs 2 times daily       multivitamin-iron-minerals-folic acid (CENTRUM) chewable tablet Take 1 tablet by mouth daily      Influenza Vac Split High-Dose (FLUZONE HIGH-DOSE) 0.5 ML FAHAD injection Fluzone High-Dose 2019-20 (PF) 180 mcg/0.5 mL intramuscular syringe      tamsulosin (FLOMAX) 0.4 MG capsule Take 1 capsule by mouth daily for 5 days 5 capsule 0     No current facility-administered medications for this visit.         Last 3 BMP  Lab Results   Component Value Date/Time     10/21/2021 03:35 PM     09/20/2021 03:00 PM     08/09/2021 03:37 PM    K 5.0 10/21/2021 03:35 PM    K 5.1 (H) 09/20/2021 03:00 PM    K 5.0 08/09/2021 03:37 PM    K 4.2 05/27/2021 02:25 PM    K 4.4 03/04/2020 02:05 PM     10/21/2021 03:35 PM     09/20/2021 03:00 PM     08/09/2021 03:37 PM    CO2 27 10/21/2021 03:35 PM    CO2 27 09/20/2021 03:00 PM    CO2 26 08/09/2021 03:37 PM    BUN 24 (H) 10/21/2021 03:35 PM    BUN 34 (H) 09/20/2021 03:00 PM    BUN 29 (H) 08/09/2021 03:37 PM    CREATININE 1.0 10/21/2021 03:35 PM    CREATININE 1.0 09/20/2021 03:00 PM    CREATININE 1.1 08/09/2021 03:37 PM    GLUCOSE 99 10/21/2021 03:35 PM    GLUCOSE 118 (H) 09/20/2021 03:00 PM    GLUCOSE 206 (H) 08/09/2021 03:37 PM    CALCIUM 9.5 10/21/2021 03:35 PM    CALCIUM 9.8 09/20/2021 03:00 PM    CALCIUM 9.3 08/09/2021 03:37 PM       Last 3 CMP:    Lab Results   Component Value Date/Time     10/21/2021 03:35 PM     09/20/2021 03:00 PM     08/09/2021 03:37 PM    K 5.0 10/21/2021 03:35 PM    K 5.1 (H) 09/20/2021 03:00 PM    K 5.0 08/09/2021 03:37 PM    K 4.2 05/27/2021 02:25 PM    K 4.4 03/04/2020 02:05 PM     10/21/2021 03:35 PM     09/20/2021 03:00 PM     08/09/2021 03:37 PM    CO2 27 10/21/2021 03:35 PM    CO2 27 09/20/2021 03:00 PM    CO2 26 08/09/2021 03:37 PM    BUN 24 (H) 10/21/2021 03:35 PM    BUN 34 (H) 09/20/2021 03:00 PM    BUN 29 (H) 08/09/2021 03:37 PM    CREATININE 1.0 10/21/2021 03:35 PM    CREATININE 1.0 09/20/2021 03:00 PM    CREATININE 1.1 08/09/2021 03:37 PM    GLUCOSE 99 10/21/2021 03:35 PM    GLUCOSE 118 (H) 09/20/2021 03:00 PM    GLUCOSE 206 (H) 08/09/2021 03:37 PM    CALCIUM 9.5 10/21/2021 03:35 PM    CALCIUM 9.8 09/20/2021 03:00 PM    CALCIUM 9.3 08/09/2021 03:37 PM    PROT 5.9 (L) 10/21/2021 03:35 PM    PROT 5.6 (L) 09/20/2021 03:00 PM    PROT 6.1 (L) 08/09/2021 03:37 PM    LABALBU 4.1 10/21/2021 03:35 PM    LABALBU 3.8 09/20/2021 03:00 PM    LABALBU 3.7 08/09/2021 03:37 PM    BILITOT 0.3 10/21/2021 03:35 PM    BILITOT 0.4 09/20/2021 03:00 PM    BILITOT 0.3 08/09/2021 03:37 PM    ALKPHOS 59 10/21/2021 03:35 PM    ALKPHOS 89 09/20/2021 03:00 PM    ALKPHOS 71 08/09/2021 03:37 PM    AST 28 10/21/2021 03:35 PM    AST 23 09/20/2021 03:00 PM    AST 18 08/09/2021 03:37 PM    ALT 21 10/21/2021 03:35 PM    ALT 16 09/20/2021 03:00 PM    ALT 18 08/09/2021 03:37 PM        CBC:   Lab Results   Component Value Date/Time    WBC 6.4 10/21/2021 03:35 PM    RBC 3.68 (L) 10/21/2021 03:35 PM    HGB 12.1 (L) 10/21/2021 03:35 PM    HCT 37.6 10/21/2021 03:35 PM    .2 (H) 10/21/2021 03:35 PM    MCH 32.9 10/21/2021 03:35 PM    MCHC 32.2 10/21/2021 03:35 PM    RDW 13.6 10/21/2021 03:35 PM     10/21/2021 03:35 PM    MPV 10.5 10/21/2021 03:35 PM       A1C:  Lab Results   Component Value Date/Time    LABA1C 5.9 (H) 03/24/2021 10:55 AM       Lipid panel:  Lab Results   Component Value Date    CHOL 236 03/24/2021    CHOL 187 07/01/2020    CHOL 251 07/20/2016    TRIG 133 03/24/2021    TRIG 87 07/01/2020    TRIG 97 07/20/2016    HDL 61 03/24/2021    HDL 47 07/01/2020    HDL 51 05/21/2020        Lab Results   Component Value Date/Time    PROT 5.9 (L) 10/21/2021 03:35 PM    PROT 5.6 (L) 09/20/2021 03:00 PM    PROT 6.1 (L) 08/09/2021 03:37 PM       No results found for: MG      Assessment. Stephanie Mayorga was seen today for back pain and eye problem. Diagnoses and all orders for this visit:    Peripheral edema    Spinal stenosis, unspecified spinal region  -     Handicap Placard MISC; Until 1/7/2025    Essential hypertension    Gastroesophageal reflux disease without esophagitis    Hypothyroidism, unspecified type    Bullous pemphigoid    Depression, unspecified depression type    Urticaria    Other orders  -     citalopram (CELEXA) 20 MG tablet; Take 1 tablet by mouth once daily  -     levothyroxine (SYNTHROID) 25 MCG tablet; Take 1 tablet by mouth Daily  -     lisinopril (PRINIVIL;ZESTRIL) 10 MG tablet; Take 1 tablet by mouth 2 times daily  -     pantoprazole (PROTONIX) 40 MG tablet; Take 1 tablet by mouth daily  -     triamterene-hydroCHLOROthiazide (MAXZIDE-25) 37.5-25 MG per tablet;  Take 0.5 tablets by mouth daily  -     tiZANidine (ZANAFLEX) 4 MG tablet; Take 1 tablet by mouth nightly as needed (back pain)       Patient Active Problem List   Diagnosis    Right inguinal hernia    Dizziness    Essential hypertension    Gastroesophageal reflux disease without esophagitis    Hypothyroidism    Spinal stenosis    Hyperlipidemia    Bullous pemphigoid    Depression    Lung nodule    Urticaria       Plan: Peripheral edema use elastic stocking both lower extremities    Spinal stenosis causing difficulty walking using a walker to walk referred to pain clinic at [address] was given    Hypertension controlled on lisinopril    Hypothyroidism continue Synthroid 25 mics      Rash on the back to the right skin I will discontinue the triamterene hydrochlorothiazide completely see if there is any reaction from the medication for the rash  Monitor blood pressure continue lisinopril 10 mg twice daily    Depression feels depressed but doing better with Celexa denies any suicidal ideation      Generalized rash etiology still not clear      Patient declined need to see any      Bullous pemphigoid follow-up with the dermatologist he had a biopsy done he is on prednisone 10 mg daily long-term side effect discussed      I will be retiring he will see another physician next visit    Return in about 3 months (around 4/7/2022).        Heidy Joel MD  11:18 AM  1/7/2022     [place]

## 2022-03-10 PROBLEM — M51.9 LUMBAR DISC DISORDER: Status: ACTIVE | Noted: 2022-03-10

## 2022-03-10 PROBLEM — M47.816 LUMBAR SPONDYLOSIS: Status: ACTIVE | Noted: 2022-03-10

## 2022-03-10 PROBLEM — M48.061 SPINAL STENOSIS OF LUMBAR REGION WITHOUT NEUROGENIC CLAUDICATION: Status: ACTIVE | Noted: 2022-03-10

## 2022-03-10 PROBLEM — M47.816 LUMBAR FACET ARTHROPATHY: Status: ACTIVE | Noted: 2022-03-10

## 2022-03-11 ENCOUNTER — OFFICE VISIT (OUTPATIENT)
Dept: PAIN MANAGEMENT | Age: 87
End: 2022-03-11
Payer: MEDICARE

## 2022-03-11 VITALS
BODY MASS INDEX: 25.03 KG/M2 | OXYGEN SATURATION: 94 % | HEART RATE: 99 BPM | TEMPERATURE: 97.3 F | DIASTOLIC BLOOD PRESSURE: 78 MMHG | RESPIRATION RATE: 16 BRPM | WEIGHT: 169 LBS | SYSTOLIC BLOOD PRESSURE: 130 MMHG | HEIGHT: 69 IN

## 2022-03-11 DIAGNOSIS — M96.1 LUMBAR POSTLAMINECTOMY SYNDROME: ICD-10-CM

## 2022-03-11 DIAGNOSIS — M54.16 LUMBAR RADICULOPATHY: ICD-10-CM

## 2022-03-11 DIAGNOSIS — M51.9 LUMBAR DISC DISORDER: ICD-10-CM

## 2022-03-11 DIAGNOSIS — M48.061 SPINAL STENOSIS OF LUMBAR REGION WITHOUT NEUROGENIC CLAUDICATION: ICD-10-CM

## 2022-03-11 DIAGNOSIS — M47.816 LUMBAR FACET ARTHROPATHY: Primary | ICD-10-CM

## 2022-03-11 DIAGNOSIS — M47.816 LUMBAR SPONDYLOSIS: ICD-10-CM

## 2022-03-11 PROCEDURE — 99204 OFFICE O/P NEW MOD 45 MIN: CPT | Performed by: PAIN MEDICINE

## 2022-03-11 RX ORDER — CETIRIZINE HYDROCHLORIDE 10 MG/1
10 TABLET ORAL DAILY
COMMUNITY
End: 2022-05-06

## 2022-03-11 RX ORDER — BRIMONIDINE TARTRATE 2 MG/ML
SOLUTION/ DROPS OPHTHALMIC
COMMUNITY
Start: 2022-02-13 | End: 2022-03-11

## 2022-03-11 RX ORDER — LISINOPRIL 10 MG/1
10 TABLET ORAL DAILY
COMMUNITY
Start: 2021-07-08 | End: 2022-03-11

## 2022-03-11 RX ORDER — DOXYCYCLINE 100 MG/1
CAPSULE ORAL
COMMUNITY
Start: 2022-03-02

## 2022-03-11 RX ORDER — KETOCONAZOLE 20 MG/G
CREAM TOPICAL
COMMUNITY
Start: 2022-01-13 | End: 2022-03-11

## 2022-03-11 NOTE — PROGRESS NOTES
Do you currently have any of the following:    Fever: No  Headache:  No  Cough: No  Shortness of breath: No  Exposed to anyone with these symptoms: No                                                                                                                Hoang Boots presents to the [address] on 3/11/2022. Carmelita Trinidad is complaining of pain in lower back on right side. . The pain is constant. The pain is described as aching, throbbing, shooting, stabbing and sharp. Pain is rated on his best day at a 1, on his worst day at a 10, and on average at a 6 on the VAS scale. He took his last dose of Tylenol . Carmelita Trinidad does not have issues with constipation. Any procedures since your last visit: No,    He is not on NSAIDS and  is not on anticoagulation medications to include none and is managed by NA. Pacemaker or defibrillator: No Physician managing device is NA. Medication Contract and Consent for Opioid Use Documents Filed      No documents found                   /78   Pulse 99   Temp 97.3 °F (36.3 °C) (Infrared)   Resp 16   Ht 5' 9\" (1.753 m)   Wt 169 lb (76.7 kg)   SpO2 94%   BMI 24.96 kg/m²      No LMP for male patient.

## 2022-03-11 NOTE — PROGRESS NOTES
[address]        [address] [facility], [address] [facility]      [phone]          Consult Note      Patient:  JANETH Ash 1935    Date of Service:  3/10/22    Requesting Physician:  Hai Shaikh MD    Reason for Consult:      Patient presents with complaints of low back pain that started 2 years ago and has been progressively getting worse. Pain is described as aching. Pain is aggravated by standing and walking, pain is relieved sitting. HISTORY OF PRESENT ILLNESS:      Pain does radiate to Left thigh down to the knee. He  does not have numbness, tingling, weakness and does not have bladder or bowel dysfunction. He has not been on anticoagulation medications to include none. The patient  has not been on herbal supplements. The patient is not diabetic. Imaging:   Lumbar spine MRI :  1.  Postoperative changes at L4/L5 and L5/S1.  There is no spinal   stenosis at these levels. 2.  Multilevel spondylosis and stenosis at the other levels as noted   above. 3.  Multilevel neural foraminal narrowing due to disc osteophyte   complexes extending into the neural foramina.  There is also a left   foraminal disc extrusion at L2/L3 compressing the left L2 nerve root. 4.  Chronic L1 compression fracture without bony retropulsion. Previous treatments: Physical Therapy, Surgery L4-5 and L5-S1 laminectomy, Left L2 TFESI poor outcome and medications. .      Opioid Agreement:  Renewal date:N/A    Past Medical History:   Diagnosis Date    Arthritis     Asthma     COPD (chronic obstructive pulmonary disease) ([facility] [address]     Depression     Hiatal hernia     Hx of solitary pulmonary nodule     Hyperglycemia     Hyperlipidemia     Hypertension     Macular degeneration     Neuropathy     Osteoarthritis     Peptic ulcer     PONV (postoperative nausea and vomiting)     Thyroid disease      Past Surgical History:   Procedure Laterality Date    BACK SURGERY      1989    COLONOSCOPY      ENDOSCOPY, COLON, DIAGNOSTIC      EYE SURGERY      viviane cataracts    HERNIA REPAIR      x2  1955, [place] Right 3/9/2020    LAPAROSCOPIC HERNIA RIGHT  INGUINAL REPAIR WITH MESH ROBOTIC XI performed by Sita Taylor MD at [place] [address]       Prior to Admission medications    Medication Sig Start Date End Date Taking?  Authorizing Provider   doxycycline monohydrate (MONODOX) 100 MG capsule TAKE 1 CAPSULE BY MOUTH TWICE DAILY WITH FOOD FOR 7 DAYS 3/2/22  Yes Historical Provider, MD   cetirizine (ZYRTEC) 10 MG tablet Take 10 mg by mouth daily   Yes Historical Provider, MD   citalopram (CELEXA) 20 MG tablet Take 1 tablet by mouth once daily 1/7/22  Yes Any Singh MD   levothyroxine (SYNTHROID) 25 MCG tablet Take 1 tablet by mouth Daily 1/7/22  Yes Any Singh MD   lisinopril (PRINIVIL;ZESTRIL) 10 MG tablet Take 1 tablet by mouth 2 times daily 1/7/22  Yes Any Singh MD   Handicap [facility] Until 1/7/2025 1/7/22  Yes Any Singh MD   prednisoLONE acetate (PRED FORTE) 1 % ophthalmic suspension INSTILL 1 DROP INTO RIGHT EYE 4 TIMES DAILY 9/19/21  Yes Historical Provider, MD   timolol (TIMOPTIC) 0.5 % ophthalmic solution INSTILL 1 DROP INTO RIGHT EYE TWICE DAILY 9/14/21  Yes Historical Provider, MD   hydrocortisone (ANUSOL-HC) 25 MG suppository Place 1 suppository rectally every 12 hours 3/23/21  Yes Any Singh MD   vitamin C (ASCORBIC ACID) 500 MG tablet Take 500 mg by mouth daily   Yes Historical Provider, MD   Multiple Vitamins-Minerals (PRESERVISION AREDS 2+MULTI VIT PO) Take 1 tablet by mouth 2 times daily    Yes Historical Provider, MD   fluticasone (FLOVENT HFA) 110 MCG/ACT inhaler Inhale 2 puffs into the lungs 2 times daily    Yes Historical Provider, MD   multivitamin-iron-minerals-folic acid (CENTRUM) chewable tablet Take 1 tablet by mouth daily   Yes Historical Provider, MD tamsulosin (FLOMAX) 0.4 MG capsule Take 1 capsule by mouth daily for 5 days 3/9/20 9/29/21  Brianne Santos MD     Allergies   Allergen Reactions    Lipitor [Atorvastatin Calcium]      Myalgia       Pcn [Penicillins] Swelling    Prednisone Swelling     Swelling of ankles     Social History     Socioeconomic History    Marital status:      Spouse name: Not on file    Number of children: Not on file    Years of education: Not on file    Highest education level: Not on file   Occupational History    Not on file   Tobacco Use    Smoking status: Never Smoker    Smokeless tobacco: Never Used   Vaping Use    Vaping Use: Never used   Substance and Sexual Activity    Alcohol use: Yes     Comment: on occasion    Drug use: Never    Sexual activity: Not Currently   Other Topics Concern    Not on file   Social History Narrative    Not on file     Social Determinants of Health     Financial Resource Strain: Low Risk     Difficulty of Paying Living Expenses: Not hard at all   Food Insecurity: No Food Insecurity    Worried About [address] [employer] in the Last Year: Never true    [address] in the Last Year: Never true   Transportation Needs:     Lack of Transportation (Medical): Not on file    Lack of Transportation (Non-Medical):  Not on file   Physical Activity:     Days of Exercise per Week: Not on file    Minutes of Exercise per Session: Not on file   Stress:     Feeling of Stress : Not on file   Social Connections:     Frequency of Communication with Friends and Family: Not on file    Frequency of Social Gatherings with Friends and Family: Not on file    Attends Pentecostalism Services: Not on file    Active Member of Clubs or Organizations: Not on file    Attends Club or Organization Meetings: Not on file    Marital Status: Not on file   Intimate Partner Violence:     Fear of Current or Ex-Partner: Not on file    Emotionally Abused: Not on file    Physically Abused: Not on file   Marcano Saliva Sexually Abused: Not on file   Housing Stability:     Unable to Pay for Housing in the Last Year: Not on file    Number of Places Lived in the Last Year: Not on file    Unstable Housing in the Last Year: Not on file     Family History   Problem Relation Age of Onset    Cancer Mother         bladder     Lung Cancer Father     High Blood Pressure Sister     High Blood Pressure Brother     Diabetes Brother      REVIEW OF SYSTEMS:     Patient specifically denies fever/chills, chest pain, shortness of breath, new bowel or bladder complaints. All other review of systems was negative. PHYSICAL EXAMINATION:      /78   Pulse 99   Temp 97.3 °F (36.3 °C) (Infrared)   Resp 16   Ht 5' 9\" (1.753 m)   Wt 169 lb (76.7 kg)   SpO2 94%   BMI 24.96 kg/m²     General:      General appearance:   elderly, pleasant and well-hydrated. , in moderate discomfort and A & O x3  Build:Normal Weight    HEENT:    Head:normocephalic and atraumatic  Sclera: icterus absent,     Lungs:    Breathing:Breathing Pattern: regular, no distress    Abdomen:    Shape:non-distended and normal  Tenderness:none    Lumbar spine:    Spine inspection:surgical incision scar   CVA tenderness:No   Palpation:tenderness paravertebral muscles, facet loading, left, right, positive and tenderness. Range of motion:abnormal moderately Lateral bending, flexion, extension rotation bilateral and is  painful. Musculoskeletal:    Trigger points in Paraveteral:absent bilaterally  SI joint tenderness:negative right, negative left              OH test:not done right, not done             left  Piriformis tenderness:negative right, negative left  Trochanteric bursa tenderness:negative right, negative left  SLR:negative right, positive left, sitting     Extremities:    Tremors:None bilaterally upper and lower  Range of motion: pain with internal rotation of hips negative.   Intact:Yes  Edema:Normal    Neurological:    Sensory:normal to light touch bilateral lower extremities. Motor:                            Right Quadriceps5-/5          Left Quadriceps5-/5           Right Gastrocnemius5-/5    Left Gastrocnemius5-/5  Right Ant Tibialis5-/5  Left Ant Tibialis5-/5  Reflexes:    Right Quadriceps reflex1+  Left Quadriceps reflex1+  Right Achilles reflex0  Left Achilles reflex0  [address]    Dermatology:    Skin:no unusual rashes and no skin lesions    Impression:  Low back pain with radiation to Left thigh down to the knee with h/o L4-5/L5-S1 laminectomy and bony fusion. Lumbar spine MRI L2-3 disc extrusion compressing Left L2 nerve root. Patient had Left L2 TFESI with less than expected response. Plan:  Left L4 radiculopathy  Reviewed imaging studies and discussed with the patient. Discussed treatment options with the patient including repeat transforaminal epidural steroid injection at L4 and referral to physical therapy. Will start with physical therapy. Patient is a good candidate for SCS trial, will revisit if needed later. Continue with OTC pain medications. Cancel urine screen. OARRS report reviewed 03/2022. Patient encouraged to stay active. Treatment plan discussed with the patient including medications and procedure side effects. We discussed with the patient that combining opioids, benzodiazepines, alcohol, illicit drugs or sleep aids increases the risk of respiratory depression including death. We discussed that these medications may cause drowsiness, sedation or dizziness and have counseled the patient not to drive or operate machinery. We have discussed that these medications will be prescribed only by one provider. We have discussed with the patient about age related risk factors and have thoroughly discussed the importance of taking these medications as prescribed. The patient verbalizes understanding. lacie Shirley M.D.

## 2022-04-11 ENCOUNTER — TELEPHONE (OUTPATIENT)
Dept: PAIN MANAGEMENT | Age: 87
End: 2022-04-11

## 2022-04-11 DIAGNOSIS — M47.816 LUMBAR SPONDYLOSIS: ICD-10-CM

## 2022-04-11 DIAGNOSIS — M48.061 SPINAL STENOSIS OF LUMBAR REGION WITHOUT NEUROGENIC CLAUDICATION: ICD-10-CM

## 2022-04-11 DIAGNOSIS — M54.16 LUMBAR RADICULOPATHY: ICD-10-CM

## 2022-04-11 DIAGNOSIS — M96.1 LUMBAR POSTLAMINECTOMY SYNDROME: ICD-10-CM

## 2022-04-11 DIAGNOSIS — M51.9 LUMBAR DISC DISORDER: ICD-10-CM

## 2022-04-11 DIAGNOSIS — M47.816 LUMBAR FACET ARTHROPATHY: Primary | ICD-10-CM

## 2022-04-12 ENCOUNTER — TELEPHONE (OUTPATIENT)
Dept: PAIN MANAGEMENT | Age: 87
End: 2022-04-12

## 2022-04-12 DIAGNOSIS — M51.9 LUMBAR DISC DISORDER: Primary | ICD-10-CM

## 2022-04-12 DIAGNOSIS — M48.061 SPINAL STENOSIS OF LUMBAR REGION WITHOUT NEUROGENIC CLAUDICATION: ICD-10-CM

## 2022-04-12 DIAGNOSIS — M96.1 LUMBAR POSTLAMINECTOMY SYNDROME: ICD-10-CM

## 2022-04-12 DIAGNOSIS — M54.16 LUMBAR RADICULOPATHY: ICD-10-CM

## 2022-04-12 DIAGNOSIS — M47.816 LUMBAR SPONDYLOSIS: ICD-10-CM

## 2022-04-12 DIAGNOSIS — M47.816 LUMBAR FACET ARTHROPATHY: ICD-10-CM

## 2022-04-12 NOTE — TELEPHONE ENCOUNTER
Lashay Zazueta called and would like an order to go to [facility]

## 2022-05-06 ENCOUNTER — OFFICE VISIT (OUTPATIENT)
Dept: PRIMARY CARE CLINIC | Age: 87
End: 2022-05-06
Payer: MEDICARE

## 2022-05-06 VITALS
HEIGHT: 69 IN | TEMPERATURE: 98.1 F | WEIGHT: 169.1 LBS | SYSTOLIC BLOOD PRESSURE: 134 MMHG | DIASTOLIC BLOOD PRESSURE: 73 MMHG | OXYGEN SATURATION: 98 % | HEART RATE: 78 BPM | BODY MASS INDEX: 25.04 KG/M2

## 2022-05-06 DIAGNOSIS — R60.0 LOWER EXTREMITY EDEMA: ICD-10-CM

## 2022-05-06 DIAGNOSIS — R60.9 EDEMA, UNSPECIFIED TYPE: ICD-10-CM

## 2022-05-06 DIAGNOSIS — G89.29 CHRONIC MIDLINE LOW BACK PAIN WITHOUT SCIATICA: ICD-10-CM

## 2022-05-06 DIAGNOSIS — M54.50 CHRONIC MIDLINE LOW BACK PAIN WITHOUT SCIATICA: ICD-10-CM

## 2022-05-06 DIAGNOSIS — I10 PRIMARY HYPERTENSION: ICD-10-CM

## 2022-05-06 DIAGNOSIS — E03.9 HYPOTHYROIDISM, UNSPECIFIED TYPE: Primary | ICD-10-CM

## 2022-05-06 LAB
ALBUMIN SERPL-MCNC: 4 G/DL (ref 3.5–5.2)
ALP BLD-CCNC: 87 U/L (ref 40–129)
ALT SERPL-CCNC: 18 U/L (ref 0–40)
ANION GAP SERPL CALCULATED.3IONS-SCNC: 10 MMOL/L (ref 7–16)
AST SERPL-CCNC: 30 U/L (ref 0–39)
BILIRUB SERPL-MCNC: 0.4 MG/DL (ref 0–1.2)
BUN BLDV-MCNC: 27 MG/DL (ref 6–23)
CALCIUM SERPL-MCNC: 9.4 MG/DL (ref 8.6–10.2)
CHLORIDE BLD-SCNC: 108 MMOL/L (ref 98–107)
CO2: 25 MMOL/L (ref 22–29)
CREAT SERPL-MCNC: 0.9 MG/DL (ref 0.7–1.2)
GFR AFRICAN AMERICAN: >60
GFR NON-AFRICAN AMERICAN: >60 ML/MIN/1.73
GLUCOSE BLD-MCNC: 104 MG/DL (ref 74–99)
POTASSIUM SERPL-SCNC: 4.6 MMOL/L (ref 3.5–5)
PRO-BNP: 1783 PG/ML (ref 0–450)
SODIUM BLD-SCNC: 143 MMOL/L (ref 132–146)
T4 FREE: 1.15 NG/DL (ref 0.93–1.7)
TOTAL PROTEIN: 6.6 G/DL (ref 6.4–8.3)
TSH SERPL DL<=0.05 MIU/L-ACNC: 5.11 UIU/ML (ref 0.27–4.2)

## 2022-05-06 PROCEDURE — 99213 OFFICE O/P EST LOW 20 MIN: CPT | Performed by: STUDENT IN AN ORGANIZED HEALTH CARE EDUCATION/TRAINING PROGRAM

## 2022-05-06 RX ORDER — ASPIRIN 81 MG/1
81 TABLET ORAL DAILY
COMMUNITY

## 2022-05-06 ASSESSMENT — PATIENT HEALTH QUESTIONNAIRE - PHQ9
5. POOR APPETITE OR OVEREATING: 1
4. FEELING TIRED OR HAVING LITTLE ENERGY: 3
SUM OF ALL RESPONSES TO PHQ9 QUESTIONS 1 & 2: 3
1. LITTLE INTEREST OR PLEASURE IN DOING THINGS: 1
10. IF YOU CHECKED OFF ANY PROBLEMS, HOW DIFFICULT HAVE THESE PROBLEMS MADE IT FOR YOU TO DO YOUR WORK, TAKE CARE OF THINGS AT HOME, OR GET ALONG WITH OTHER PEOPLE: 1
6. FEELING BAD ABOUT YOURSELF - OR THAT YOU ARE A FAILURE OR HAVE LET YOURSELF OR YOUR FAMILY DOWN: 0
2. FEELING DOWN, DEPRESSED OR HOPELESS: 2
3. TROUBLE FALLING OR STAYING ASLEEP: 3
SUM OF ALL RESPONSES TO PHQ QUESTIONS 1-9: 12
8. MOVING OR SPEAKING SO SLOWLY THAT OTHER PEOPLE COULD HAVE NOTICED. OR THE OPPOSITE, BEING SO FIGETY OR RESTLESS THAT YOU HAVE BEEN MOVING AROUND A LOT MORE THAN USUAL: 0
SUM OF ALL RESPONSES TO PHQ QUESTIONS 1-9: 12
7. TROUBLE CONCENTRATING ON THINGS, SUCH AS READING THE NEWSPAPER OR WATCHING TELEVISION: 2
SUM OF ALL RESPONSES TO PHQ QUESTIONS 1-9: 12
SUM OF ALL RESPONSES TO PHQ QUESTIONS 1-9: 12
9. THOUGHTS THAT YOU WOULD BE BETTER OFF DEAD, OR OF HURTING YOURSELF: 0

## 2022-05-06 NOTE — PROGRESS NOTES
Susie Harman (:  1935) is a 80 y.o. male,New patient, here for evaluation of the following chief complaint(s):  New Patient, Leg Swelling (Both legs a swelling), and Joint Pain (Needs somethings to help with pain)         ASSESSMENT/PLAN:  1. Hypothyroidism, unspecified type  2. Primary hypertension  3. Edema, unspecified type  -     Brain Natriuretic Peptide; Future  -     TSH; Future  -     T4, Free; Future  -     Comprehensive Metabolic Panel; Future  4. Chronic midline low back pain without sciatica  -     diclofenac sodium (VOLTAREN) 1 % GEL; Apply 4 g topically 4 times daily, Topical, 4 TIMES DAILY Starting 2022, Disp-4 g, R-0, Normal      Return in about 2 weeks (around 2022). Unclear cause as to lower extremity edema; L knee is very edematous but no erythematous and only minor swelling in lower legs so will obtain x-rays of the knee; cardiac cause seems less likely given no cardiopulmonary symptoms and benign cardiopulmonary exam; DVT seems less likely in L leg but may consider ultrasound in x-rays unremarkable and pain persists     Subjective   SUBJECTIVE/OBJECTIVE:  HPI     Patient is a 79 y/o M with a PMHx of bullous pemphigoid, HTN, depression, BPH and chronic back pain who presents to establish care. Previously followed with Dr. Jose Rodriguez. His biggest complaint today is worsening swelling in his lower extremities, specifically his L knee that has been present for over a week. Denies any inciting injury to the knee. Swelling has made it difficult for him to bend his leg. He denies leg giving out, no warmth fever.  Thinks he has had some more swelling in his legs too; denies cough, cough, chest pain, PND, orthopnea; no new medications and recent review of labs show no issue with CKD or liver disease     Bullous pemphigoid- follows with derm and is on dupixent     Glaucoma- on eye drops and follows with ophthalmology    Chronic back pain- has a history of spinal stenosis and follows with pain management       Review of Systems   Constitutional: Negative. HENT: Negative. Respiratory: Negative. Cardiovascular: Positive for leg swelling. Gastrointestinal: Negative. Genitourinary: Negative. Musculoskeletal: Positive for arthralgias (L knee pain). Skin: Negative. Psychiatric/Behavioral: Negative. Objective   Physical Exam  Vitals reviewed. Constitutional:       General: He is not in acute distress. Appearance: Normal appearance. HENT:      Head: Normocephalic and atraumatic. Cardiovascular:      Rate and Rhythm: Normal rate and regular rhythm. Pulses: Normal pulses. Heart sounds: Normal heart sounds. Musculoskeletal:         General: Tenderness (TTP inner left knee) present. Right lower leg: Edema (1+ edema to shin) present. Left lower leg: Edema (1+edema to shin) present. Comments: No ligament laxity  No erythema or warmth  Edema of medial and posterior knee  ROM limited on flexion due to swelling   Skin:     General: Skin is warm and dry. Neurological:      Mental Status: He is alert. An electronic signature was used to authenticate this note.     --Baldo Calderon MD

## 2022-05-09 ASSESSMENT — ENCOUNTER SYMPTOMS
GASTROINTESTINAL NEGATIVE: 1
RESPIRATORY NEGATIVE: 1

## 2022-05-10 ENCOUNTER — TELEPHONE (OUTPATIENT)
Dept: PRIMARY CARE CLINIC | Age: 87
End: 2022-05-10

## 2022-05-10 NOTE — TELEPHONE ENCOUNTER
Pt son would like to know if you can test patient for alpha- gal syndrome- saw during research into his fathers dermatological issue.   Patient has a dermatologist but preferred to ask you first.

## 2022-06-15 ENCOUNTER — OFFICE VISIT (OUTPATIENT)
Dept: PRIMARY CARE CLINIC | Age: 87
End: 2022-06-15
Payer: MEDICARE

## 2022-06-15 VITALS
TEMPERATURE: 98.9 F | HEIGHT: 69 IN | WEIGHT: 155 LBS | OXYGEN SATURATION: 95 % | SYSTOLIC BLOOD PRESSURE: 196 MMHG | DIASTOLIC BLOOD PRESSURE: 95 MMHG | HEART RATE: 82 BPM | BODY MASS INDEX: 22.96 KG/M2

## 2022-06-15 DIAGNOSIS — J98.4 CAVITARY LESION OF LUNG: ICD-10-CM

## 2022-06-15 DIAGNOSIS — R91.1 LUNG NODULE: ICD-10-CM

## 2022-06-15 DIAGNOSIS — R10.84 GENERALIZED ABDOMINAL PAIN: ICD-10-CM

## 2022-06-15 DIAGNOSIS — R63.4 ABNORMAL WEIGHT LOSS: Primary | ICD-10-CM

## 2022-06-15 PROCEDURE — 1123F ACP DISCUSS/DSCN MKR DOCD: CPT | Performed by: STUDENT IN AN ORGANIZED HEALTH CARE EDUCATION/TRAINING PROGRAM

## 2022-06-15 PROCEDURE — 99214 OFFICE O/P EST MOD 30 MIN: CPT | Performed by: STUDENT IN AN ORGANIZED HEALTH CARE EDUCATION/TRAINING PROGRAM

## 2022-06-15 SDOH — ECONOMIC STABILITY: FOOD INSECURITY: WITHIN THE PAST 12 MONTHS, THE FOOD YOU BOUGHT JUST DIDN'T LAST AND YOU DIDN'T HAVE MONEY TO GET MORE.: NEVER TRUE

## 2022-06-15 SDOH — ECONOMIC STABILITY: FOOD INSECURITY: WITHIN THE PAST 12 MONTHS, YOU WORRIED THAT YOUR FOOD WOULD RUN OUT BEFORE YOU GOT MONEY TO BUY MORE.: NEVER TRUE

## 2022-06-15 ASSESSMENT — ENCOUNTER SYMPTOMS
RESPIRATORY NEGATIVE: 1
GASTROINTESTINAL NEGATIVE: 1

## 2022-06-15 ASSESSMENT — SOCIAL DETERMINANTS OF HEALTH (SDOH): HOW HARD IS IT FOR YOU TO PAY FOR THE VERY BASICS LIKE FOOD, HOUSING, MEDICAL CARE, AND HEATING?: NOT HARD AT ALL

## 2022-06-15 NOTE — PROGRESS NOTES
CXR shows a 2.2cm x 2.6 cm LLL opacity that was not there 1 year ago and recommend CT scan of chest; labs show elevated inflammatory makers and stable anemia  - CT Chest showed multiple lung lesions, some cavitary and one containing gas; CT abd/pelvis unremarkable; unclear if lesions in lung are infectious or malignant so will order labs and refer to pulmonology for their input; patient may need bronch and or bx of a lesion; he is on dupixant but per literature review, less likely to suppress immune system and cause infection but cannot completely rule this out    Alan Yen (:  1935) is a 80 y.o. male, established patient, here for evaluation of the following chief complaint(s):  Follow-up (BP is up today/itchy back), Hypothyroidism (He is worried about weight loss), and Leg Swelling (both legs seem to be swollen and red with a spot that looks lliquid )         ASSESSMENT/PLAN:  1. Abnormal weight loss  -     CBC with Auto Differential; Future  -     Comprehensive Metabolic Panel; Future  -     Sedimentation Rate; Future  -     C-Reactive Protein; Future  -     XR CHEST STANDARD (2 VW); Future  -     CT ABDOMEN PELVIS W WO CONTRAST Additional Contrast? Oral; Future  2. Generalized abdominal pain  -     CT ABDOMEN PELVIS W WO CONTRAST Additional Contrast? Oral; Future      Return in about 3 weeks (around 2022). Not clear of the etiology of weight loss; will obtain labs to help narrow differential; cannot r/o malignancy; also considering atypical depression and have advised patient to try to keep track of what he is eating    Subjective   SUBJECTIVE/OBJECTIVE:  HPI    Patient is a 81 y/o M with a PMHx of depression, HTN, hypothyroidism and psoriasis who presents for weight loss and ongoing R knee pain. He reports that is biggest concern today is that he feels more fatigued and has lost is appetite. He has lost 14 pounds unexpectedly since last visit.  He states that he does eat but admits to not as much as he used too. He also reports that his stools seem to be more hard ut he denies dark stools or blood in his stools; no chest pain, SOA, nausea, muscle weakness, new headaches, changes in sleep, fever, night sweats; does reports his mood is a little down-he is a social person and has felt more isolated since the death of his wife; no SI    His psoriasis has flared up on his back and he will see his dermatologist in 2 weeks; currently on dupixent; also having a flare on his legs but they do not seem as swollen as previously    Continuing to have some discomfort in his L knee and hip but stable and he knows he does have arthritis, continues to ambulate with walker; denies any falls  Review of Systems   Constitutional: Positive for fatigue and unexpected weight change. HENT: Negative. Respiratory: Negative. Cardiovascular: Negative. Gastrointestinal: Negative. Endocrine: Negative. Genitourinary: Negative. Musculoskeletal: Positive for arthralgias. Skin: Positive for rash. Neurological: Negative. Psychiatric/Behavioral: Negative. Objective   Physical Exam  Vitals reviewed. Constitutional:       General: He is not in acute distress. Comments: Thin     HENT:      Head: Normocephalic and atraumatic. Eyes:      General:         Right eye: No discharge. Left eye: No discharge. Cardiovascular:      Pulses: Normal pulses. Heart sounds: Normal heart sounds. Pulmonary:      Effort: Pulmonary effort is normal.      Breath sounds: Normal breath sounds. Abdominal:      General: Bowel sounds are normal. There is no distension. Palpations: Abdomen is soft. There is no mass. Tenderness: There is no abdominal tenderness. There is no guarding. Musculoskeletal:      Cervical back: Neck supple. No tenderness. Comments: No axillary lymphadenopathy    Lymphadenopathy:      Cervical: No cervical adenopathy. Skin:     General: Skin is warm and dry. Findings: Rash (scaling rash of back) present. Neurological:      General: No focal deficit present. Mental Status: He is alert and oriented to person, place, and time. Gait: Gait abnormal (ambulate with walker). Time spent reviewing records, patient encounter and chartin minutes      An electronic signature was used to authenticate this note.     --Kyra Downs MD

## 2022-06-16 DIAGNOSIS — R63.4 ABNORMAL WEIGHT LOSS: ICD-10-CM

## 2022-06-16 LAB
ALBUMIN SERPL-MCNC: 3.9 G/DL (ref 3.5–5.2)
ALP BLD-CCNC: 82 U/L (ref 40–129)
ALT SERPL-CCNC: 10 U/L (ref 0–40)
ANION GAP SERPL CALCULATED.3IONS-SCNC: 8 MMOL/L (ref 7–16)
AST SERPL-CCNC: 20 U/L (ref 0–39)
BASOPHILS ABSOLUTE: 0.03 E9/L (ref 0–0.2)
BASOPHILS RELATIVE PERCENT: 0.4 % (ref 0–2)
BILIRUB SERPL-MCNC: 0.4 MG/DL (ref 0–1.2)
BUN BLDV-MCNC: 24 MG/DL (ref 6–23)
C-REACTIVE PROTEIN: 0.5 MG/DL (ref 0–0.4)
CALCIUM SERPL-MCNC: 9 MG/DL (ref 8.6–10.2)
CHLORIDE BLD-SCNC: 107 MMOL/L (ref 98–107)
CO2: 28 MMOL/L (ref 22–29)
CREAT SERPL-MCNC: 0.9 MG/DL (ref 0.7–1.2)
EOSINOPHILS ABSOLUTE: 0.75 E9/L (ref 0.05–0.5)
EOSINOPHILS RELATIVE PERCENT: 9.9 % (ref 0–6)
GFR AFRICAN AMERICAN: >60
GFR NON-AFRICAN AMERICAN: >60 ML/MIN/1.73
GLUCOSE BLD-MCNC: 92 MG/DL (ref 74–99)
HCT VFR BLD CALC: 36.1 % (ref 37–54)
HEMOGLOBIN: 11.1 G/DL (ref 12.5–16.5)
IMMATURE GRANULOCYTES #: 0.03 E9/L
IMMATURE GRANULOCYTES %: 0.4 % (ref 0–5)
LYMPHOCYTES ABSOLUTE: 1.51 E9/L (ref 1.5–4)
LYMPHOCYTES RELATIVE PERCENT: 19.9 % (ref 20–42)
MCH RBC QN AUTO: 31 PG (ref 26–35)
MCHC RBC AUTO-ENTMCNC: 30.7 % (ref 32–34.5)
MCV RBC AUTO: 100.8 FL (ref 80–99.9)
MONOCYTES ABSOLUTE: 0.88 E9/L (ref 0.1–0.95)
MONOCYTES RELATIVE PERCENT: 11.6 % (ref 2–12)
NEUTROPHILS ABSOLUTE: 4.4 E9/L (ref 1.8–7.3)
NEUTROPHILS RELATIVE PERCENT: 57.8 % (ref 43–80)
PDW BLD-RTO: 14.2 FL (ref 11.5–15)
PLATELET # BLD: 251 E9/L (ref 130–450)
PMV BLD AUTO: 11.4 FL (ref 7–12)
POTASSIUM SERPL-SCNC: 4.7 MMOL/L (ref 3.5–5)
RBC # BLD: 3.58 E12/L (ref 3.8–5.8)
SODIUM BLD-SCNC: 143 MMOL/L (ref 132–146)
TOTAL PROTEIN: 6.4 G/DL (ref 6.4–8.3)
WBC # BLD: 7.6 E9/L (ref 4.5–11.5)

## 2022-06-17 ENCOUNTER — TELEPHONE (OUTPATIENT)
Dept: PRIMARY CARE CLINIC | Age: 87
End: 2022-06-17

## 2022-06-17 LAB — SEDIMENTATION RATE, ERYTHROCYTE: 32 MM/HR (ref 0–15)

## 2022-06-17 NOTE — TELEPHONE ENCOUNTER
Vanesa Long called back and I told him about CT of chest and he was wondering if he can do that one the same times as his stomach CT. I told him to call and talk to scheduling to see if this will be ok.

## 2022-06-17 NOTE — TELEPHONE ENCOUNTER
----- Message from Linda Nichols MD sent at 6/17/2022  6:47 AM EDT -----  Patient's chest x-ray shows he has a spot in hi lower left lung that was not there a year ago. It is not clear what this is -infection or a mass. So we need to get a CT scan of his chest to help determine what this spot is. His labs look OK. [place]

## 2022-06-17 NOTE — TELEPHONE ENCOUNTER
----- Message from Hesham La MD sent at 6/17/2022  6:47 AM EDT -----  Patient's chest x-ray shows he has a spot in hi lower left lung that was not there a year ago. It is not clear what this is -infection or a mass. So we need to get a CT scan of his chest to help determine what this spot is. His labs look OK. Stiven Hassan

## 2022-06-24 ENCOUNTER — HOSPITAL ENCOUNTER (OUTPATIENT)
Dept: CT IMAGING | Age: 87
Discharge: HOME | End: 2022-06-24
Payer: MEDICARE

## 2022-06-24 DIAGNOSIS — R10.84 GENERALIZED ABDOMINAL PAIN: ICD-10-CM

## 2022-06-24 DIAGNOSIS — R91.1 LUNG NODULE: ICD-10-CM

## 2022-06-24 DIAGNOSIS — R63.4 ABNORMAL WEIGHT LOSS: ICD-10-CM

## 2022-06-24 PROCEDURE — 6360000004 HC RX CONTRAST MEDICATION: Performed by: RADIOLOGY

## 2022-06-24 PROCEDURE — 71260 CT THORAX DX C+: CPT

## 2022-06-24 PROCEDURE — 74177 CT ABD & PELVIS W/CONTRAST: CPT

## 2022-06-24 RX ADMIN — IOHEXOL 50 ML: 240 INJECTION, SOLUTION INTRATHECAL; INTRAVASCULAR; INTRAVENOUS; ORAL at 17:11

## 2022-06-24 RX ADMIN — IOPAMIDOL 50 ML: 755 INJECTION, SOLUTION INTRAVENOUS at 17:11

## 2022-06-28 DIAGNOSIS — R91.1 LUNG NODULE: ICD-10-CM

## 2022-06-28 DIAGNOSIS — R63.4 ABNORMAL WEIGHT LOSS: ICD-10-CM

## 2022-06-30 ENCOUNTER — HOSPITAL ENCOUNTER (OUTPATIENT)
Age: 87
Discharge: HOME | End: 2022-06-30
Payer: MEDICARE

## 2022-06-30 DIAGNOSIS — J98.4 CAVITARY LESION OF LUNG: ICD-10-CM

## 2022-06-30 LAB — RHEUMATOID FACTOR: <10 IU/ML (ref 0–13)

## 2022-06-30 PROCEDURE — 86481 TB AG RESPONSE T-CELL SUSP: CPT

## 2022-06-30 PROCEDURE — 86431 RHEUMATOID FACTOR QUANT: CPT

## 2022-06-30 PROCEDURE — 82784 ASSAY IGA/IGD/IGG/IGM EACH: CPT

## 2022-06-30 PROCEDURE — 86703 HIV-1/HIV-2 1 RESULT ANTBDY: CPT

## 2022-06-30 PROCEDURE — 87103 BLOOD FUNGUS CULTURE: CPT

## 2022-06-30 PROCEDURE — 86038 ANTINUCLEAR ANTIBODIES: CPT

## 2022-06-30 PROCEDURE — 87449 NOS EACH ORGANISM AG IA: CPT

## 2022-06-30 PROCEDURE — 36415 COLL VENOUS BLD VENIPUNCTURE: CPT

## 2022-07-01 LAB
ANTI-NUCLEAR ANTIBODY (ANA): NEGATIVE
HIV-1 AND HIV-2 ANTIBODIES: NORMAL
IGA: 576 MG/DL (ref 70–400)
IGG: 899 MG/DL (ref 700–1600)
IGM: 40 MG/DL (ref 40–230)
L. PNEUMOPHILA SEROGP 1 UR AG: NORMAL
STREP PNEUMONIAE ANTIGEN, URINE: NORMAL

## 2022-07-04 LAB
(1,3)-BETA-D-GLUCAN (FUNGITELL) INTERPRETATION: NEGATIVE
(1,3)-BETA-D-GLUCAN (FUNGITELL): <31 PG/ML
COMMENT: NORMAL
REPORT: NORMAL

## 2022-07-06 ENCOUNTER — OFFICE VISIT (OUTPATIENT)
Dept: PRIMARY CARE CLINIC | Age: 87
End: 2022-07-06
Payer: MEDICARE

## 2022-07-06 VITALS
OXYGEN SATURATION: 99 % | SYSTOLIC BLOOD PRESSURE: 153 MMHG | DIASTOLIC BLOOD PRESSURE: 88 MMHG | BODY MASS INDEX: 22.5 KG/M2 | HEIGHT: 69 IN | TEMPERATURE: 98.6 F | HEART RATE: 76 BPM | WEIGHT: 151.9 LBS

## 2022-07-06 DIAGNOSIS — R91.8 PULMONARY NODULES/LESIONS, MULTIPLE: Primary | ICD-10-CM

## 2022-07-06 PROCEDURE — 1123F ACP DISCUSS/DSCN MKR DOCD: CPT | Performed by: STUDENT IN AN ORGANIZED HEALTH CARE EDUCATION/TRAINING PROGRAM

## 2022-07-06 PROCEDURE — 99213 OFFICE O/P EST LOW 20 MIN: CPT | Performed by: STUDENT IN AN ORGANIZED HEALTH CARE EDUCATION/TRAINING PROGRAM

## 2022-07-06 ASSESSMENT — ENCOUNTER SYMPTOMS
GASTROINTESTINAL NEGATIVE: 1
RESPIRATORY NEGATIVE: 1
BACK PAIN: 1

## 2022-07-06 NOTE — PROGRESS NOTES
Patrica Gates (:  1935) is a 80 y.o. male,Established patient, here for evaluation of the following chief complaint(s):  Follow-up, Weight Loss (He is still lossing weight), and Fall (He fall in the parking lot at bank on Friday and hurt his left side even more)         ASSESSMENT/PLAN:  1. Pulmonary nodules/lesions, multiple  -     CT NEEDLE BIOPSY LUNG PERCUTANEOUS; Future      No follow-ups on file. Given unremarkable labs, concerning that lesions are more concerning for malignancy; will hold on pulmonary for now and pursue bx  Subjective   SUBJECTIVE/OBJECTIVE:  HPI     Patient is a 79 y/o M with a PMHx of HTN and psoriasis who presents for follow up of lung lesions. Labs were unremarkable    He has lost 4 more pounds. He reports a fall when trying to put his walker in the car; fell on left side; did not hit his head; no bruising of his chest or abdomen; pain has improved    Denies fever, chills, SOA     He has not heard from Dr. Isaiah Medeiros office about an appointment           Review of Systems   Constitutional: Positive for unexpected weight change. HENT: Negative. Respiratory: Negative. Cardiovascular: Negative. Gastrointestinal: Negative. Musculoskeletal: Positive for arthralgias and back pain. Skin: Negative. Psychiatric/Behavioral: Negative. Objective   Physical Exam  Vitals reviewed. Constitutional:       General: He is not in acute distress. Appearance: Normal appearance. HENT:      Head: Normocephalic and atraumatic. Cardiovascular:      Rate and Rhythm: Normal rate and regular rhythm. Pulses: Normal pulses. Heart sounds: Normal heart sounds. Pulmonary:      Effort: Pulmonary effort is normal.      Breath sounds: Normal breath sounds. Musculoskeletal:         General: No swelling, tenderness or deformity. Skin:     Findings: No bruising. Neurological:      General: No focal deficit present.       Mental Status: He is alert and oriented to person, place, and time. An electronic signature was used to authenticate this note.     --Percy Gutierrez MD

## 2022-07-12 ENCOUNTER — TELEPHONE (OUTPATIENT)
Dept: PRIMARY CARE CLINIC | Age: 87
End: 2022-07-12

## 2022-07-12 DIAGNOSIS — A31.0 ATYPICAL MYCOBACTERIAL INFECTION OF LUNG (HCC): Primary | ICD-10-CM

## 2022-07-12 RX ORDER — AZITHROMYCIN 500 MG/1
500 TABLET, FILM COATED ORAL DAILY
Qty: 3 TABLET | Refills: 0 | Status: SHIPPED | OUTPATIENT
Start: 2022-07-12 | End: 2022-07-15

## 2022-07-12 RX ORDER — DOXYCYCLINE HYCLATE 100 MG
100 TABLET ORAL 2 TIMES DAILY
Qty: 14 TABLET | Refills: 0 | Status: SHIPPED | OUTPATIENT
Start: 2022-07-12 | End: 2022-07-19

## 2022-08-01 LAB — CULTURE, FUNGUS BLOOD: NORMAL

## 2022-08-15 ENCOUNTER — OFFICE VISIT (OUTPATIENT)
Dept: PRIMARY CARE CLINIC | Age: 87
End: 2022-08-15
Payer: MEDICARE

## 2022-08-15 VITALS
OXYGEN SATURATION: 96 % | HEIGHT: 69 IN | TEMPERATURE: 99 F | BODY MASS INDEX: 22.22 KG/M2 | WEIGHT: 150 LBS | DIASTOLIC BLOOD PRESSURE: 78 MMHG | HEART RATE: 84 BPM | SYSTOLIC BLOOD PRESSURE: 138 MMHG

## 2022-08-15 DIAGNOSIS — M13.852 ALLERGIC ARTHRITIS OF LEFT HIP: Primary | ICD-10-CM

## 2022-08-15 DIAGNOSIS — N30.90 CYSTITIS: ICD-10-CM

## 2022-08-15 PROCEDURE — 1123F ACP DISCUSS/DSCN MKR DOCD: CPT | Performed by: STUDENT IN AN ORGANIZED HEALTH CARE EDUCATION/TRAINING PROGRAM

## 2022-08-15 PROCEDURE — 99213 OFFICE O/P EST LOW 20 MIN: CPT | Performed by: STUDENT IN AN ORGANIZED HEALTH CARE EDUCATION/TRAINING PROGRAM

## 2022-08-15 RX ORDER — SULFAMETHOXAZOLE AND TRIMETHOPRIM 800; 160 MG/1; MG/1
TABLET ORAL
COMMUNITY
Start: 2022-08-12 | End: 2022-08-15

## 2022-08-15 RX ORDER — AZITHROMYCIN 500 MG/1
TABLET, FILM COATED ORAL
COMMUNITY

## 2022-08-15 RX ORDER — SULFAMETHOXAZOLE AND TRIMETHOPRIM 800; 160 MG/1; MG/1
TABLET ORAL
COMMUNITY

## 2022-08-15 RX ORDER — HYDROXYZINE HYDROCHLORIDE 25 MG/1
TABLET, FILM COATED ORAL
COMMUNITY

## 2022-08-15 NOTE — PROGRESS NOTES
General:         Right eye: No discharge. Left eye: No discharge. Cardiovascular:      Rate and Rhythm: Normal rate. Pulses: Normal pulses. Heart sounds: Normal heart sounds. Pulmonary:      Effort: Pulmonary effort is normal.      Breath sounds: Normal breath sounds. Abdominal:      General: Bowel sounds are normal. There is no distension. Palpations: Abdomen is soft. Skin:     General: Skin is warm and dry. Comments: Healing abrasion of the R forearm    Neurological:      General: No focal deficit present. Mental Status: He is alert and oriented to person, place, and time. Psychiatric:         Mood and Affect: Mood normal.         Behavior: Behavior normal.                An electronic signature was used to authenticate this note.     --Jamee Beasley MD

## 2022-08-15 NOTE — PATIENT INSTRUCTIONS
Final Anesthesia Post-op Assessment    Patient: Srinivas Miller  Procedure(s) Performed: EGD  Anesthesia type: General    Vitals Value Taken Time   Temp 36.2 °C (97.2 °F) 03/31/22 1104   Pulse 91 03/31/22 1104   Resp 16 03/31/22 1104   SpO2 98 % 03/31/22 1104   /52 03/31/22 1104         Patient Location: PACU Phase 1  Post-op Vital Signs:stable  Level of Consciousness: sedated and follows commands  Respiratory Status: spontaneous ventilation  Cardiovascular blood pressure returned to baseline  Hydration: euvolemic  Pain Management: adequately controlled  Handoff: Handoff to receiving clinician was performed and questions were answered  Nausea: None  Airway Patency:patent  Post-op Assessment: awake, alert, appropriately conversant, or baseline, no complications, patient tolerated procedure well with no complications, no evidence of recall, dentition within defined limits and moving all extremities      No complications documented.    Keep taking bactrim; start taking azithromycin once a day for 3 days; do not take doxycycline

## 2022-08-16 ASSESSMENT — ENCOUNTER SYMPTOMS
GASTROINTESTINAL NEGATIVE: 1
BACK PAIN: 1
RESPIRATORY NEGATIVE: 1

## 2022-09-02 ENCOUNTER — HOSPITAL ENCOUNTER (EMERGENCY)
Age: 87
Discharge: HOME | End: 2022-09-02
Payer: MEDICARE

## 2022-09-02 ENCOUNTER — APPOINTMENT (OUTPATIENT)
Dept: GENERAL RADIOLOGY | Age: 87
End: 2022-09-02
Payer: MEDICARE

## 2022-09-02 ENCOUNTER — TELEPHONE (OUTPATIENT)
Dept: PRIMARY CARE CLINIC | Age: 87
End: 2022-09-02

## 2022-09-02 VITALS
WEIGHT: 150 LBS | HEART RATE: 87 BPM | SYSTOLIC BLOOD PRESSURE: 148 MMHG | RESPIRATION RATE: 16 BRPM | DIASTOLIC BLOOD PRESSURE: 79 MMHG | BODY MASS INDEX: 22.15 KG/M2 | TEMPERATURE: 98.2 F | OXYGEN SATURATION: 97 %

## 2022-09-02 DIAGNOSIS — R68.83 CHILLS: Primary | ICD-10-CM

## 2022-09-02 LAB
BACTERIA: ABNORMAL /HPF
BASOPHILS ABSOLUTE: 0.04 E9/L (ref 0–0.2)
BASOPHILS RELATIVE PERCENT: 0.5 % (ref 0–2)
BILIRUBIN URINE: NEGATIVE
BLOOD, URINE: ABNORMAL
CLARITY: CLEAR
CO2: 27 MMOL/L (ref 22–29)
COLOR: YELLOW
EOSINOPHILS ABSOLUTE: 0.49 E9/L (ref 0.05–0.5)
EOSINOPHILS RELATIVE PERCENT: 6.3 % (ref 0–6)
GFR AFRICAN AMERICAN: >60
GFR NON-AFRICAN AMERICAN: >60 ML/MIN/1.73
GLUCOSE BLD-MCNC: 90 MG/DL (ref 74–99)
GLUCOSE URINE: NEGATIVE MG/DL
HCT VFR BLD CALC: 37.6 % (ref 37–54)
HEMOGLOBIN: 12.4 G/DL (ref 12.5–16.5)
IMMATURE GRANULOCYTES #: 0.02 E9/L
IMMATURE GRANULOCYTES %: 0.3 % (ref 0–5)
KETONES, URINE: NEGATIVE MG/DL
LEUKOCYTE ESTERASE, URINE: NEGATIVE
LYMPHOCYTES ABSOLUTE: 2.53 E9/L (ref 1.5–4)
LYMPHOCYTES RELATIVE PERCENT: 32.7 % (ref 20–42)
MCH RBC QN AUTO: 32.1 PG (ref 26–35)
MCHC RBC AUTO-ENTMCNC: 33 % (ref 32–34.5)
MCV RBC AUTO: 97.4 FL (ref 80–99.9)
MONOCYTES ABSOLUTE: 0.75 E9/L (ref 0.1–0.95)
MONOCYTES RELATIVE PERCENT: 9.7 % (ref 2–12)
NEUTROPHILS ABSOLUTE: 3.91 E9/L (ref 1.8–7.3)
NEUTROPHILS RELATIVE PERCENT: 50.5 % (ref 43–80)
NITRITE, URINE: NEGATIVE
PDW BLD-RTO: 13.8 FL (ref 11.5–15)
PERFORMED ON: ABNORMAL
PH UA: 5 (ref 5–9)
PLATELET # BLD: 206 E9/L (ref 130–450)
PMV BLD AUTO: 10.2 FL (ref 7–12)
POC ANION GAP: 10 MMOL/L (ref 7–16)
POC BUN: 27 MG/DL (ref 8–23)
POC CHLORIDE: 105 MMOL/L (ref 100–108)
POC CREATININE: 0.7 MG/DL (ref 0.7–1.2)
POC POTASSIUM: 4.4 MMOL/L (ref 3.5–5)
POC SODIUM: 142 MMOL/L (ref 132–146)
PROTEIN UA: NEGATIVE MG/DL
RBC # BLD: 3.86 E12/L (ref 3.8–5.8)
RBC UA: ABNORMAL /HPF (ref 0–2)
SARS-COV-2, NAAT: NOT DETECTED
SPECIFIC GRAVITY UA: 1.02 (ref 1–1.03)
UROBILINOGEN, URINE: 0.2 E.U./DL
WBC # BLD: 7.7 E9/L (ref 4.5–11.5)
WBC UA: ABNORMAL /HPF (ref 0–5)

## 2022-09-02 PROCEDURE — 85025 COMPLETE CBC W/AUTO DIFF WBC: CPT

## 2022-09-02 PROCEDURE — 82947 ASSAY GLUCOSE BLOOD QUANT: CPT

## 2022-09-02 PROCEDURE — 71046 X-RAY EXAM CHEST 2 VIEWS: CPT

## 2022-09-02 PROCEDURE — 80051 ELECTROLYTE PANEL: CPT

## 2022-09-02 PROCEDURE — 84520 ASSAY OF UREA NITROGEN: CPT

## 2022-09-02 PROCEDURE — 99211 OFF/OP EST MAY X REQ PHY/QHP: CPT

## 2022-09-02 PROCEDURE — 87635 SARS-COV-2 COVID-19 AMP PRB: CPT

## 2022-09-02 PROCEDURE — 36415 COLL VENOUS BLD VENIPUNCTURE: CPT

## 2022-09-02 PROCEDURE — 82565 ASSAY OF CREATININE: CPT

## 2022-09-02 PROCEDURE — 81001 URINALYSIS AUTO W/SCOPE: CPT

## 2022-09-02 NOTE — TELEPHONE ENCOUNTER
I called pt and advised him to go to urgent care or the walk in clinic.   Pt agreed and said he was going,

## 2022-09-02 NOTE — ED PROVIDER NOTES
Department of Emergency Medicine  [address] [place] [address]  Provider Note  Admit Date/Time: 2022  6:00 PM  Room:   NAME: Beronica Dominguez  : 1935  MRN: [id number]     Chief Complaint:  Chills (For 2 days, felt like he had some labored breathing)    History of Present Illness        Beronica Dominguez is a 80 y.o. male who has a past medical history of:   Past Medical History:   Diagnosis Date    Arthritis     Asthma     COPD (chronic obstructive pulmonary disease) ([address]     Depression     Hiatal hernia     Hx of solitary pulmonary nodule     Hyperglycemia     Hyperlipidemia     Hypertension     Macular degeneration     Neuropathy     Osteoarthritis     Peptic ulcer     PONV (postoperative nausea and vomiting)     Thyroid disease     presents to the urgent care center by private car for evaluation. He said he just has not felt good for the last 2 days he feels that his breathing is worse than usual he does have COPD --he said he has been chilling and cold he said he had to turn the furnace on this morning it was so cold. He said that he has been sneezing a lot. He does not have any nasal congestion or discharge he is not complaining of chest or abdominal pain he is not complaining of urinary symptoms. He states  that he has been vaccinated for COVID has not been around anyone sick. ROS    Pertinent positives and negatives are stated within HPI, all other systems reviewed and are negative. Past Surgical History:   Procedure Laterality Date    BACK SURGERY          COLONOSCOPY      ENDOSCOPY, COLON, DIAGNOSTIC      EYE SURGERY      viviane cataracts    HERNIA REPAIR      x2  1955, [address] Right 3/9/2020    LAPAROSCOPIC HERNIA RIGHT  INGUINAL REPAIR WITH MESH ROBOTIC XI performed by Agus Parmar MD at [facility] History:  reports that he has never smoked. He has never used smokeless tobacco. He reports current alcohol use.  He reports that he does not use drugs. Family History: family history includes Cancer in his mother; Diabetes in his brother; High Blood Pressure in his brother and sister; Coralyn Irene in his father. Allergies: Lipitor [atorvastatin calcium], Pcn [penicillins], and Prednisone    Physical Exam   Oxygen Saturation Interpretation: Normal.   ED Triage Vitals [09/02/22 1801]   BP Temp Temp src Heart Rate Resp SpO2 Height Weight   (!) 148/79 98.2 °F (36.8 °C) -- 87 16 97 % -- 150 lb (68 kg)       Physical Exam  Constitutional/General: Alert and oriented x3, well appearing, non toxic in NAD  HEENT:  NC/NT. No Pharyngeal erythema. No tenderness over the sinuses  Neck: Supple, full ROM,   Respiratory: Lungs clear to auscultation bilaterally, no wheezes, rales, or rhonchi. Not in respiratory distress  CV:  Regular rate. Regular rhythm. No murmurs, gallops, or rubs. 2+ distal pulses  GI:  Abdomen Soft, Non tender, Non distended. +BS. No rebound, guarding, or rigidity. No pulsatile masses. Musculoskeletal: Moves all extremities x 4. Integument: skin warm and dry. No rashes.    Lymphatic: no lymphadenopathy noted  Neurologic: GCS 15, no focal deficits,   Psychiatric: Normal Affect    Lab / Imaging Results   (All laboratory and radiology results have been personally reviewed by myself)  Labs:  Results for orders placed or performed during the hospital encounter of 09/02/22   COVID-19, Rapid    Specimen: Nasopharyngeal Swab   Result Value Ref Range    SARS-CoV-2, NAAT Not Detected Not Detected   Urinalysis   Result Value Ref Range    Color, UA Yellow Straw/Yellow    Clarity, UA Clear Clear    Glucose, Ur Negative Negative mg/dL    Bilirubin Urine Negative Negative    Ketones, Urine Negative Negative mg/dL    Specific Gravity, UA 1.025 1.005 - 1.030    Blood, Urine TRACE (A) Negative    pH, UA 5.0 5.0 - 9.0    Protein, UA Negative Negative mg/dL    Urobilinogen, Urine 0.2 <2.0 E.U./dL    Nitrite, Urine Negative Negative    Leukocyte Esterase, Urine Negative Negative   CBC with Auto Differential   Result Value Ref Range    WBC 7.7 4.5 - 11.5 E9/L    RBC 3.86 3.80 - 5.80 E12/L    Hemoglobin 12.4 (L) 12.5 - 16.5 g/dL    Hematocrit 37.6 37.0 - 54.0 %    MCV 97.4 80.0 - 99.9 fL    MCH 32.1 26.0 - 35.0 pg    MCHC 33.0 32.0 - 34.5 %    RDW 13.8 11.5 - 15.0 fL    Platelets 723 441 - 248 E9/L    MPV 10.2 7.0 - 12.0 fL    Neutrophils % 50.5 43.0 - 80.0 %    Immature Granulocytes % 0.3 0.0 - 5.0 %    Lymphocytes % 32.7 20.0 - 42.0 %    Monocytes % 9.7 2.0 - 12.0 %    Eosinophils % 6.3 (H) 0.0 - 6.0 %    Basophils % 0.5 0.0 - 2.0 %    Neutrophils Absolute 3.91 1.80 - 7.30 E9/L    Immature Granulocytes # 0.02 E9/L    Lymphocytes Absolute 2.53 1.50 - 4.00 E9/L    Monocytes Absolute 0.75 0.10 - 0.95 E9/L    Eosinophils Absolute 0.49 0.05 - 0.50 E9/L    Basophils Absolute 0.04 0.00 - 0.20 E9/L   Microscopic Urinalysis   Result Value Ref Range    WBC, UA 0-1 0 - 5 /HPF    RBC, UA 1-3 0 - 2 /HPF    Bacteria, UA FEW (A) None Seen /HPF   POCT Venous   Result Value Ref Range    POC Sodium 142 132 - 146 mmol/L    POC Potassium 4.4 3.5 - 5.0 mmol/L    POC Chloride 105 100 - 108 mmol/L    CO2 27 22 - 29 mmol/L    POC Anion Gap 10 7 - 16 mmol/L    POC Glucose 90 74 - 99 mg/dl    POC BUN 27 (H) 8 - 23 mg/dL    POC Creatinine 0.7 0.7 - 1.2 mg/dL    GFR Non-African American >60 >=60 mL/min/1.73    GFR  >60     Performed on SEE BELOW      Imaging: All Radiology results interpreted by Radiologist unless otherwise noted. XR CHEST (2 VW)   Final Result   No acute process. Interval clearing of left basilar infiltrates.              ED Course / Medical Decision Making   Medications - No data to display       Consult(s):   None    MDM:  I did a COVID swab on him and it was negative his chest was normal.  White count was normal urine just had a few bacteria --he is not really symptomatic with the urine so I advised him to follow-up with his doctor--- he does not have a fever there are no signs of an infection at this point. His exam is negative. Plan of Care/Counseling:  I reviewed today's visit with the patient in addition to providing specific details for the plan of care and counseling regarding the diagnosis and prognosis. Questions are answered at this time and are agreeable with the plan. Assessment      1. Chills      Plan   Discharge to home and advised to contact Yara Samano MD  [address] [place] [address]. [address] [place] [address]  [phone]    Schedule an appointment as soon as possible for a visit    Patient condition is good    New Medications     New Prescriptions    No medications on file     Electronically signed by KAT Riggs CNP   DD: 9/2/22  **This report was transcribed using voice recognition software. Every effort was made to ensure accuracy; however, inadvertent computerized transcription errors may be present.   END OF ED PROVIDER NOTE      KAT Riggs CNP  09/02/22 0658

## 2022-09-09 ENCOUNTER — HOSPITAL ENCOUNTER (OUTPATIENT)
Dept: NUCLEAR MEDICINE | Age: 87
Discharge: HOME | End: 2022-09-09
Payer: MEDICARE

## 2022-09-09 DIAGNOSIS — R91.1 SOLITARY PULMONARY NODULE: ICD-10-CM

## 2022-09-09 PROCEDURE — 3430000000 HC RX DIAGNOSTIC RADIOPHARMACEUTICAL: Performed by: RADIOLOGY

## 2022-09-09 PROCEDURE — A9552 F18 FDG: HCPCS | Performed by: RADIOLOGY

## 2022-09-09 PROCEDURE — 78815 PET IMAGE W/CT SKULL-THIGH: CPT

## 2022-09-09 RX ORDER — FLUDEOXYGLUCOSE F 18 200 MCI/ML
15 INJECTION, SOLUTION INTRAVENOUS
Status: COMPLETED | OUTPATIENT
Start: 2022-09-09 | End: 2022-09-09

## 2022-09-09 RX ADMIN — FLUDEOXYGLUCOSE F 18 15 MILLICURIE: 200 INJECTION, SOLUTION INTRAVENOUS at 21:32

## 2022-09-15 RX ORDER — LEVOTHYROXINE SODIUM 0.03 MG/1
25 TABLET ORAL DAILY
Qty: 90 TABLET | Refills: 1 | Status: SHIPPED | OUTPATIENT
Start: 2022-09-15

## 2022-09-17 DIAGNOSIS — M25.552 LEFT HIP PAIN: Primary | ICD-10-CM

## 2022-10-03 RX ORDER — LISINOPRIL 10 MG/1
10 TABLET ORAL 2 TIMES DAILY
Qty: 180 TABLET | Refills: 1 | Status: SHIPPED | OUTPATIENT
Start: 2022-10-03

## 2022-11-22 ENCOUNTER — OFFICE VISIT (OUTPATIENT)
Dept: PRIMARY CARE CLINIC | Age: 87
End: 2022-11-22
Payer: MEDICARE

## 2022-11-22 VITALS
WEIGHT: 148 LBS | TEMPERATURE: 97.5 F | DIASTOLIC BLOOD PRESSURE: 78 MMHG | HEART RATE: 96 BPM | SYSTOLIC BLOOD PRESSURE: 128 MMHG | BODY MASS INDEX: 21.92 KG/M2 | OXYGEN SATURATION: 96 % | HEIGHT: 69 IN

## 2022-11-22 DIAGNOSIS — F51.01 PRIMARY INSOMNIA: ICD-10-CM

## 2022-11-22 DIAGNOSIS — R35.1 NOCTURIA: Primary | ICD-10-CM

## 2022-11-22 PROCEDURE — 99213 OFFICE O/P EST LOW 20 MIN: CPT | Performed by: STUDENT IN AN ORGANIZED HEALTH CARE EDUCATION/TRAINING PROGRAM

## 2022-11-22 PROCEDURE — 1123F ACP DISCUSS/DSCN MKR DOCD: CPT | Performed by: STUDENT IN AN ORGANIZED HEALTH CARE EDUCATION/TRAINING PROGRAM

## 2022-11-22 RX ORDER — MIRTAZAPINE 7.5 MG/1
7.5 TABLET, FILM COATED ORAL NIGHTLY
Qty: 90 TABLET | Refills: 1 | Status: SHIPPED | OUTPATIENT
Start: 2022-11-22

## 2022-11-22 RX ORDER — MIRTAZAPINE 7.5 MG/1
7.5 TABLET, FILM COATED ORAL NIGHTLY
Qty: 30 TABLET | Refills: 5 | Status: SHIPPED
Start: 2022-11-22 | End: 2022-11-22

## 2022-11-22 NOTE — PROGRESS NOTES
Jorden Collins (:  1935) is a 80 y.o. male,Established patient, here for evaluation of the following chief complaint(s):  Dysuria (He state that he pees about every two hours at night and about every 4 or so during the day. ) and Fatigue (Tired all the time.)         ASSESSMENT/PLAN:  1. Nocturia  2. Primary insomnia  -     mirtazapine (REMERON) 7.5 MG tablet; Take 1 tablet by mouth nightly, Disp-90 tablet, R-1Normal      Return in about 6 weeks (around 1/3/2023). Nocturia likely related to drinking fluids right up until bed; this had been worked up previously and no evidence of diabetes, infection; symptoms are not consistent with BPH so patient would not benefit from flomax-might actually make symptoms worse; advised to not drink anything 2 hours prior to bed and use restroom right before bed    I suspect his fatigue is due to poor sleep due to the above and depression; will add remeron to see if this helps sleep and mood and will continue to monitor; prior labs and work up have all be unremarkable     Subjective   SUBJECTIVE/OBJECTIVE:  HPI    Patient is a 81 y/o M with a PMHx of HTN, hypothyroidism and sever dermatitis who present for acute visit for nocturia and fatigue. He has had nocturia before previously UA, electrolytes and a1c all unremarkable. He reports that he denies nay urinary symptoms-no dysuria, hematuria, urgency, difficulty started his urine stream; he does report that he will eat around midnight and drink fluids and then go to bed; denies any caffeine or alcohol intake in the evening; no new medications    He reports he feels tired and no \"pep. \" He does not get a restful night's sleep due to the above; denies changes in appetite; does report that his mind sometimes races at night and he has trouble falling asleep; he reports feeling down at times; he does not see family often and will talk on the phone with family occasionally; denies fever, changes in stool; chest pain, SOA    He did have a PET scan with Dr. Doris Dunn that showed nodule in his lungs resolved after antibiotics and no areas of increased uptake    Review of Systems   Constitutional:  Positive for fatigue. HENT: Negative. Respiratory: Negative. Cardiovascular: Negative. Gastrointestinal: Negative. Genitourinary:         Nocturia   Musculoskeletal:  Positive for arthralgias. Skin: Negative. Neurological: Negative. Psychiatric/Behavioral:  Positive for sleep disturbance. Objective   Physical Exam  Vitals reviewed. Constitutional:       General: He is not in acute distress. Appearance: Normal appearance. HENT:      Head: Normocephalic and atraumatic. Cardiovascular:      Rate and Rhythm: Normal rate and regular rhythm. Pulses: Normal pulses. Heart sounds: Normal heart sounds. Pulmonary:      Effort: Pulmonary effort is normal.      Breath sounds: Normal breath sounds. Abdominal:      General: Bowel sounds are normal.      Palpations: Abdomen is soft. Tenderness: There is no abdominal tenderness. There is no right CVA tenderness or left CVA tenderness. Neurological:      General: No focal deficit present. Mental Status: He is alert and oriented to person, place, and time. Psychiatric:         Mood and Affect: Mood normal.         Behavior: Behavior normal.                An electronic signature was used to authenticate this note.     --Ricarda Buck MD

## 2022-11-24 ASSESSMENT — ENCOUNTER SYMPTOMS
GASTROINTESTINAL NEGATIVE: 1
RESPIRATORY NEGATIVE: 1

## 2022-11-28 ENCOUNTER — TELEPHONE (OUTPATIENT)
Dept: PRIMARY CARE CLINIC | Age: 87
End: 2022-11-28

## 2022-11-28 NOTE — TELEPHONE ENCOUNTER
Pt states saw advice that mirtazapine should not be taken if you have glaucoma- wants to know if he should take it?

## 2022-11-29 ENCOUNTER — TELEPHONE (OUTPATIENT)
Dept: PRIMARY CARE CLINIC | Age: 87
End: 2022-11-29

## 2023-01-04 ENCOUNTER — OFFICE VISIT (OUTPATIENT)
Dept: FAMILY MEDICINE CLINIC | Age: 88
End: 2023-01-04
Payer: MEDICARE

## 2023-01-04 VITALS
BODY MASS INDEX: 21.92 KG/M2 | RESPIRATION RATE: 18 BRPM | DIASTOLIC BLOOD PRESSURE: 98 MMHG | OXYGEN SATURATION: 100 % | TEMPERATURE: 97.5 F | SYSTOLIC BLOOD PRESSURE: 173 MMHG | HEART RATE: 91 BPM | WEIGHT: 148 LBS | HEIGHT: 69 IN

## 2023-01-04 DIAGNOSIS — H61.21 IMPACTED CERUMEN OF RIGHT EAR: Primary | ICD-10-CM

## 2023-01-04 PROCEDURE — 1123F ACP DISCUSS/DSCN MKR DOCD: CPT

## 2023-01-04 PROCEDURE — 99213 OFFICE O/P EST LOW 20 MIN: CPT

## 2023-01-04 NOTE — PROGRESS NOTES
Chief Complaint   Ear Fullness (Patient states that he woke up 2 days ago with no hearing in his right ear )      History of Present Illness   Source of history provided by:  patient. Ruben Aragon is a 80 y.o. old male presenting to the walk in clinic for ear fullness to the right ear for the past 2 days. Pt does report decreased hearing. There is no pain associated with the complaint. Pt has tried nothing OTC without relief. Pt has a history of cerumen impaction in the past. Pt usually sees [facility] every 6 months and has missed his appointment in November. Denies any pain, bleeding, discharge from the ear, fever, chills, lethargy, or additional URI symptoms. ROS    Unless otherwise stated in this report or unable to obtain because of the patient's clinical or mental status as evidenced by the medical record, this patients's positive and negative responses for Review of Systems, constitutional, psych, eyes, ENT, cardiovascular, respiratory, gastrointestinal, neurological, genitourinary, musculoskeletal, integument systems and systems related to the presenting problem are either stated in the preceding or were not pertinent or were negative for the symptoms and/or complaints related to the medical problem. Physical Exam         VS:  BP (!) 173/98   Pulse 91   Temp 97.5 °F (36.4 °C) (Temporal)   Resp 18   Ht 5' 9\" (1.753 m)   Wt 148 lb (67.1 kg)   SpO2 100%   BMI 21.86 kg/m²    Oxygen Saturation Interpretation: Normal.    Constitutional:  Alert, development consistent with age. Eyes:  PERRL, EOMI, no discharge or conjunctival injection. Ears:  External Ears: Normal bilaterally. TM's & External Canals:  Pre procedure: right ear canal with cerumen impaction, TMs not able to be visualized. Post procedure: Bilateral TMs translucent without erythema or perforation, translucent. No drainage or active bleeding noted. Nose:  No drainage or active bleeding.   Mouth:  Mucous membranes moist without lesions, tongue and gums normal.  Throat:  Pharynx without injection, exudate, or tonsillar hypertrophy.  Airway patient.  No blood or drainage noted.    Neck:  Supple.  No lymphadenopathy.  Lungs:  Clear to auscultation and breath sounds equal.  Heart:  Regular rate and rhythm.  Skin:  Normal turgor.  Warm, dry, without visible rash, unless noted elsewhere.  Neurological:  Orientation age-appropriate unless noted elseware.  Motor functions intact.    Lab / Imaging Results   (All laboratory and radiology results have been personally reviewed by myself)  Labs:      Imaging:  All Radiology results interpreted by Radiologist unless otherwise noted.      Assessment / Plan     Impression(s):  Dariusz was seen today for ear fullness.    Diagnoses and all orders for this visit:    Impacted cerumen of right ear      Disposition:  Disposition: Discharge to home.    Cerumen removal was successfully attempted using ear irrigation and light ear curette. Pt tolerated the procedure well and reports total improvement in symptoms. No signs of trauma or active bleeding noted to the site. Advised to closely monitor for signs of secondary infection at home including pain, purulent drainage, uncontrolled bleeding, surrounding redness, or fever. Pt states understanding and is in agreement with this care plan. All questions answered.    Prisca Richards, KAT - CNP    **This report was transcribed using voice recognition software. Every effort was made to ensure accuracy; however, inadvertent computerized transcription errors may be present.

## 2023-02-06 ENCOUNTER — OFFICE VISIT (OUTPATIENT)
Dept: PRIMARY CARE CLINIC | Age: 88
End: 2023-02-06
Payer: MEDICARE

## 2023-02-06 VITALS
SYSTOLIC BLOOD PRESSURE: 173 MMHG | TEMPERATURE: 98.1 F | DIASTOLIC BLOOD PRESSURE: 94 MMHG | HEART RATE: 88 BPM | OXYGEN SATURATION: 95 % | BODY MASS INDEX: 22.14 KG/M2 | WEIGHT: 149.9 LBS

## 2023-02-06 DIAGNOSIS — E78.5 ELEVATED FASTING LIPID PROFILE: ICD-10-CM

## 2023-02-06 DIAGNOSIS — R53.1 LEFT-SIDED WEAKNESS: ICD-10-CM

## 2023-02-06 DIAGNOSIS — R29.810 FACIAL DROOP: Primary | ICD-10-CM

## 2023-02-06 PROCEDURE — 99214 OFFICE O/P EST MOD 30 MIN: CPT | Performed by: STUDENT IN AN ORGANIZED HEALTH CARE EDUCATION/TRAINING PROGRAM

## 2023-02-06 PROCEDURE — 1123F ACP DISCUSS/DSCN MKR DOCD: CPT | Performed by: STUDENT IN AN ORGANIZED HEALTH CARE EDUCATION/TRAINING PROGRAM

## 2023-02-06 SDOH — ECONOMIC STABILITY: HOUSING INSECURITY
IN THE LAST 12 MONTHS, WAS THERE A TIME WHEN YOU DID NOT HAVE A STEADY PLACE TO SLEEP OR SLEPT IN A SHELTER (INCLUDING NOW)?: NO

## 2023-02-06 SDOH — ECONOMIC STABILITY: FOOD INSECURITY: WITHIN THE PAST 12 MONTHS, YOU WORRIED THAT YOUR FOOD WOULD RUN OUT BEFORE YOU GOT MONEY TO BUY MORE.: NEVER TRUE

## 2023-02-06 SDOH — ECONOMIC STABILITY: FOOD INSECURITY: WITHIN THE PAST 12 MONTHS, THE FOOD YOU BOUGHT JUST DIDN'T LAST AND YOU DIDN'T HAVE MONEY TO GET MORE.: NEVER TRUE

## 2023-02-06 SDOH — ECONOMIC STABILITY: INCOME INSECURITY: HOW HARD IS IT FOR YOU TO PAY FOR THE VERY BASICS LIKE FOOD, HOUSING, MEDICAL CARE, AND HEATING?: NOT HARD AT ALL

## 2023-02-06 ASSESSMENT — ENCOUNTER SYMPTOMS
GASTROINTESTINAL NEGATIVE: 1
EYES NEGATIVE: 1
RESPIRATORY NEGATIVE: 1
BACK PAIN: 1

## 2023-02-06 NOTE — PROGRESS NOTES
Kalani Webb (:  1935) is a 80 y.o. male,Established patient, here for evaluation of the following chief complaint(s):  Cerebrovascular Accident (Left side mouth was dropping along with left eye problems and left arm pain. The family noticed these things last night but it has been weeks since they had seen him. )         ASSESSMENT/PLAN:  1. Facial droop  -     MRI BRAIN W WO CONTRAST; Future  -     Comprehensive Metabolic Panel; Future  -      [facility] 9  2. Left-sided weakness  -     Comprehensive Metabolic Panel; Future  -     CBC with Auto Differential; Future  -      [facility] 9  3. Elevated fasting lipid profile  -     LIPID PANEL; Future    No follow-ups on file. Unclear if patient had a CVA or Bell's Palsy    Will obtain workup as above; if imagining shows stoke; will restart statin    Will continue to monitor BP    Subjective   SUBJECTIVE/OBJECTIVE:  HPI    Patient is a 81 y/o M with a PMHx of HTN , hypothyroidism who presents with family with concerns for possible CVA. They saw the patient 2 weeks ago and was fine and then when they saw patient last night, noticed L sided facial droop and changes in speech. Patient denies any symptoms- no headache, weakness, tingling, numbness, headache, vision changes    Continues to ambulate with a walker and denies any falls; able to get around OK at home and perform most of his ADLs    Review of Systems   Constitutional:  Positive for fatigue. HENT: Negative. Eyes: Negative. Respiratory: Negative. Cardiovascular: Negative. Gastrointestinal: Negative. Musculoskeletal:  Positive for back pain and myalgias. Neurological: Negative. Psychiatric/Behavioral:  Positive for sleep disturbance. Objective   Physical Exam  Vitals reviewed. Constitutional:       General: He is not in acute distress. Appearance: Normal appearance. HENT:      Head: Normocephalic and atraumatic.    Eyes:      General: Right eye: No discharge. Cardiovascular:      Rate and Rhythm: Normal rate and regular rhythm. Pulses: Normal pulses. Heart sounds: Normal heart sounds. Pulmonary:      Effort: Pulmonary effort is normal.      Breath sounds: Normal breath sounds. Neurological:      General: No focal deficit present. Mental Status: He is alert and oriented to person, place, and time. Cranial Nerves: No cranial nerve deficit. Sensory: Sensory deficit (decreased sensation of the L face, including forehead; slight facial droop) present. Motor: Weakness (weakness of LUE but appears effort dependent) present. Coordination: Coordination normal.   Psychiatric:         Mood and Affect: Mood normal.         Behavior: Behavior normal.              An electronic signature was used to authenticate this note.     --Adams Kayser, MD

## 2023-02-07 DIAGNOSIS — E78.5 ELEVATED FASTING LIPID PROFILE: ICD-10-CM

## 2023-02-07 DIAGNOSIS — R29.810 FACIAL DROOP: ICD-10-CM

## 2023-02-07 DIAGNOSIS — R53.1 LEFT-SIDED WEAKNESS: ICD-10-CM

## 2023-02-07 LAB
ALBUMIN SERPL-MCNC: 4.2 G/DL (ref 3.5–5.2)
ALP BLD-CCNC: 83 U/L (ref 40–129)
ALT SERPL-CCNC: 13 U/L (ref 0–40)
ANION GAP SERPL CALCULATED.3IONS-SCNC: 18 MMOL/L (ref 7–16)
AST SERPL-CCNC: 20 U/L (ref 0–39)
BASOPHILS ABSOLUTE: 0.05 E9/L (ref 0–0.2)
BASOPHILS RELATIVE PERCENT: 0.6 % (ref 0–2)
BILIRUB SERPL-MCNC: 0.6 MG/DL (ref 0–1.2)
BUN BLDV-MCNC: 23 MG/DL (ref 6–23)
CALCIUM SERPL-MCNC: 10 MG/DL (ref 8.6–10.2)
CHLORIDE BLD-SCNC: 101 MMOL/L (ref 98–107)
CHOLESTEROL, TOTAL: 207 MG/DL (ref 0–199)
CO2: 25 MMOL/L (ref 22–29)
CREAT SERPL-MCNC: 0.9 MG/DL (ref 0.7–1.2)
EOSINOPHILS ABSOLUTE: 0.51 E9/L (ref 0.05–0.5)
EOSINOPHILS RELATIVE PERCENT: 6.2 % (ref 0–6)
GFR SERPL CREATININE-BSD FRML MDRD: >60 ML/MIN/1.73
GLUCOSE BLD-MCNC: 106 MG/DL (ref 74–99)
HCT VFR BLD CALC: 40.1 % (ref 37–54)
HDLC SERPL-MCNC: 59 MG/DL
HEMOGLOBIN: 13.1 G/DL (ref 12.5–16.5)
IMMATURE GRANULOCYTES #: 0.02 E9/L
IMMATURE GRANULOCYTES %: 0.2 % (ref 0–5)
LDL CHOLESTEROL CALCULATED: 129 MG/DL (ref 0–99)
LYMPHOCYTES ABSOLUTE: 1.95 E9/L (ref 1.5–4)
LYMPHOCYTES RELATIVE PERCENT: 23.6 % (ref 20–42)
MCH RBC QN AUTO: 32.1 PG (ref 26–35)
MCHC RBC AUTO-ENTMCNC: 32.7 % (ref 32–34.5)
MCV RBC AUTO: 98.3 FL (ref 80–99.9)
MONOCYTES ABSOLUTE: 0.82 E9/L (ref 0.1–0.95)
MONOCYTES RELATIVE PERCENT: 9.9 % (ref 2–12)
NEUTROPHILS ABSOLUTE: 4.91 E9/L (ref 1.8–7.3)
NEUTROPHILS RELATIVE PERCENT: 59.5 % (ref 43–80)
PDW BLD-RTO: 13.3 FL (ref 11.5–15)
PLATELET # BLD: 239 E9/L (ref 130–450)
PMV BLD AUTO: 11.1 FL (ref 7–12)
POTASSIUM SERPL-SCNC: 4.5 MMOL/L (ref 3.5–5)
RBC # BLD: 4.08 E12/L (ref 3.8–5.8)
SODIUM BLD-SCNC: 144 MMOL/L (ref 132–146)
TOTAL PROTEIN: 7.2 G/DL (ref 6.4–8.3)
TRIGL SERPL-MCNC: 94 MG/DL (ref 0–149)
VLDLC SERPL CALC-MCNC: 19 MG/DL
WBC # BLD: 8.3 E9/L (ref 4.5–11.5)

## 2023-03-15 RX ORDER — LEVOTHYROXINE SODIUM 0.03 MG/1
25 TABLET ORAL DAILY
Qty: 90 TABLET | Refills: 1 | Status: SHIPPED | OUTPATIENT
Start: 2023-03-15

## 2023-03-27 ENCOUNTER — APPOINTMENT (OUTPATIENT)
Dept: CT IMAGING | Age: 88
End: 2023-03-27
Payer: MEDICARE

## 2023-03-27 ENCOUNTER — APPOINTMENT (OUTPATIENT)
Dept: GENERAL RADIOLOGY | Age: 88
End: 2023-03-27
Payer: MEDICARE

## 2023-03-27 ENCOUNTER — HOSPITAL ENCOUNTER (EMERGENCY)
Age: 88
Discharge: HOME | End: 2023-03-27
Attending: EMERGENCY MEDICINE
Payer: MEDICARE

## 2023-03-27 VITALS
RESPIRATION RATE: 16 BRPM | WEIGHT: 149 LBS | BODY MASS INDEX: 22 KG/M2 | DIASTOLIC BLOOD PRESSURE: 90 MMHG | SYSTOLIC BLOOD PRESSURE: 170 MMHG | HEART RATE: 71 BPM | TEMPERATURE: 97.7 F | OXYGEN SATURATION: 98 %

## 2023-03-27 DIAGNOSIS — R42 DIZZINESS: Primary | ICD-10-CM

## 2023-03-27 DIAGNOSIS — I10 ESSENTIAL HYPERTENSION: ICD-10-CM

## 2023-03-27 LAB
ALBUMIN SERPL-MCNC: 3.9 G/DL (ref 3.5–5.2)
ALP SERPL-CCNC: 72 U/L (ref 40–129)
ALT SERPL-CCNC: 11 U/L (ref 0–40)
ANION GAP SERPL CALCULATED.3IONS-SCNC: 9 MMOL/L (ref 7–16)
AST SERPL-CCNC: 21 U/L (ref 0–39)
BASOPHILS # BLD: 0.04 E9/L (ref 0–0.2)
BASOPHILS NFR BLD: 0.7 % (ref 0–2)
BILIRUB SERPL-MCNC: 0.6 MG/DL (ref 0–1.2)
BUN SERPL-MCNC: 23 MG/DL (ref 6–23)
CALCIUM SERPL-MCNC: 9.5 MG/DL (ref 8.6–10.2)
CHLORIDE SERPL-SCNC: 104 MMOL/L (ref 98–107)
CO2 SERPL-SCNC: 28 MMOL/L (ref 22–29)
CREAT SERPL-MCNC: 0.8 MG/DL (ref 0.7–1.2)
EKG ATRIAL RATE: 79 BPM
EKG P AXIS: 38 DEGREES
EKG P-R INTERVAL: 212 MS
EKG Q-T INTERVAL: 410 MS
EKG QRS DURATION: 140 MS
EKG QTC CALCULATION (BAZETT): 470 MS
EKG R AXIS: -52 DEGREES
EKG T AXIS: -6 DEGREES
EKG VENTRICULAR RATE: 79 BPM
EOSINOPHIL # BLD: 0.26 E9/L (ref 0.05–0.5)
EOSINOPHIL NFR BLD: 4.8 % (ref 0–6)
ERYTHROCYTE [DISTWIDTH] IN BLOOD BY AUTOMATED COUNT: 13.2 FL (ref 11.5–15)
GLUCOSE SERPL-MCNC: 99 MG/DL (ref 74–99)
HCT VFR BLD AUTO: 38.6 % (ref 37–54)
HGB BLD-MCNC: 12.4 G/DL (ref 12.5–16.5)
IMM GRANULOCYTES # BLD: 0.01 E9/L
IMM GRANULOCYTES NFR BLD: 0.2 % (ref 0–5)
LYMPHOCYTES # BLD: 1.9 E9/L (ref 1.5–4)
LYMPHOCYTES NFR BLD: 34.9 % (ref 20–42)
MCH RBC QN AUTO: 33.3 PG (ref 26–35)
MCHC RBC AUTO-ENTMCNC: 32.1 % (ref 32–34.5)
MCV RBC AUTO: 103.8 FL (ref 80–99.9)
MONOCYTES # BLD: 0.64 E9/L (ref 0.1–0.95)
MONOCYTES NFR BLD: 11.7 % (ref 2–12)
NEUTROPHILS # BLD: 2.6 E9/L (ref 1.8–7.3)
NEUTS SEG NFR BLD: 47.7 % (ref 43–80)
PLATELET # BLD AUTO: 225 E9/L (ref 130–450)
PMV BLD AUTO: 10.4 FL (ref 7–12)
POTASSIUM SERPL-SCNC: 3.9 MMOL/L (ref 3.5–5)
PROT SERPL-MCNC: 6.6 G/DL (ref 6.4–8.3)
RBC # BLD AUTO: 3.72 E12/L (ref 3.8–5.8)
SODIUM SERPL-SCNC: 141 MMOL/L (ref 132–146)
TROPONIN, HIGH SENSITIVITY: 37 NG/L (ref 0–11)
TROPONIN, HIGH SENSITIVITY: 38 NG/L (ref 0–11)
TROPONIN, HIGH SENSITIVITY: 47 NG/L (ref 0–11)
WBC # BLD: 5.5 E9/L (ref 4.5–11.5)

## 2023-03-27 PROCEDURE — 70498 CT ANGIOGRAPHY NECK: CPT

## 2023-03-27 PROCEDURE — 96374 THER/PROPH/DIAG INJ IV PUSH: CPT

## 2023-03-27 PROCEDURE — 93010 ELECTROCARDIOGRAM REPORT: CPT | Performed by: INTERNAL MEDICINE

## 2023-03-27 PROCEDURE — 80053 COMPREHEN METABOLIC PANEL: CPT

## 2023-03-27 PROCEDURE — 70496 CT ANGIOGRAPHY HEAD: CPT

## 2023-03-27 PROCEDURE — 93005 ELECTROCARDIOGRAM TRACING: CPT

## 2023-03-27 PROCEDURE — 71045 X-RAY EXAM CHEST 1 VIEW: CPT

## 2023-03-27 PROCEDURE — 84484 ASSAY OF TROPONIN QUANT: CPT

## 2023-03-27 PROCEDURE — 85025 COMPLETE CBC W/AUTO DIFF WBC: CPT

## 2023-03-27 PROCEDURE — 99285 EMERGENCY DEPT VISIT HI MDM: CPT

## 2023-03-27 PROCEDURE — 6360000004 HC RX CONTRAST MEDICATION: Performed by: RADIOLOGY

## 2023-03-27 PROCEDURE — 2500000003 HC RX 250 WO HCPCS

## 2023-03-27 PROCEDURE — 36415 COLL VENOUS BLD VENIPUNCTURE: CPT

## 2023-03-27 RX ORDER — LABETALOL HYDROCHLORIDE 5 MG/ML
10 INJECTION, SOLUTION INTRAVENOUS ONCE
Status: COMPLETED | OUTPATIENT
Start: 2023-03-27 | End: 2023-03-27

## 2023-03-27 RX ADMIN — IOPAMIDOL 75 ML: 755 INJECTION, SOLUTION INTRAVENOUS at 12:01

## 2023-03-27 RX ADMIN — LABETALOL HYDROCHLORIDE 10 MG: 5 INJECTION, SOLUTION INTRAVENOUS at 09:53

## 2023-03-27 ASSESSMENT — PAIN - FUNCTIONAL ASSESSMENT: PAIN_FUNCTIONAL_ASSESSMENT: NONE - DENIES PAIN

## 2023-03-27 NOTE — ED PROVIDER NOTES
normal limits   COMPREHENSIVE METABOLIC PANEL       When ordered only abnormal lab results are displayed. All other labs were within normal range or not returned as of this dictation. EKG:  EKG: This EKG is signed by emergency department physician. Rate: 79  Rhythm: Normal sinus  Interpretation: no acute changes  Comparison: stable as compared to patient's most recent EKG       RADIOLOGY:   Non-plain film images such as CT, Ultrasound and MRI are read by the radiologist. Plain radiographic images are visualized and preliminarily interpreted by the ED Provider with the below findings:    CTA head and neck: No LVO, no significant stenosis or aneurysm. CXR: No acute findings    Interpretation per the Radiologist below, if available at the time of this note:    Discussed with Radiologist: no      PROCEDURES   Unless otherwise noted below, none     Procedures      PAST MEDICAL HISTORY      has a past medical history of Arthritis, Asthma, COPD (chronic obstructive pulmonary disease) ([address] Depression, Hiatal hernia, solitary pulmonary nodule, Hyperglycemia, Hyperlipidemia, Hypertension, Macular degeneration, Neuropathy, Osteoarthritis, Peptic ulcer, PONV (postoperative nausea and vomiting), and Thyroid disease. EMERGENCY DEPARTMENT COURSE and DIFFERENTIAL DIAGNOSIS/MDM:   Vitals:    Vitals:    03/27/23 1024 03/27/23 1138 03/27/23 1246 03/27/23 1416   BP: (!) 173/87 (!) 179/101 (!) 171/93 (!) 170/90   Pulse: 59 72 72 71   Resp: 14 16 15 16   Temp:       TempSrc:       SpO2:  98% 98% 98%   Weight:             ED Course as of 03/28/23 0956   Mon Mar 27, 2023   1016   ATTENDING PROVIDER ATTESTATION:     I have personally performed and/or participated in the history, exam, medical decision making, and procedures and agree with all pertinent clinical information unless otherwise noted. I have also reviewed and agree with the past medical, family and social history unless otherwise noted.     I have discussed this

## 2023-03-27 NOTE — ED NOTES
Pt ambulated with walker with no assistance. Pt c/o lightheadedness while ambulating.      Macrina Daniels RN  03/27/23 0588

## 2023-04-03 RX ORDER — LISINOPRIL 10 MG/1
10 TABLET ORAL 2 TIMES DAILY
Qty: 180 TABLET | Refills: 1 | Status: SHIPPED
Start: 2023-04-03 | End: 2023-04-06

## 2023-04-04 ENCOUNTER — TELEPHONE (OUTPATIENT)
Dept: PRIMARY CARE CLINIC | Age: 88
End: 2023-04-04

## 2023-04-04 NOTE — TELEPHONE ENCOUNTER
Blood pressure 162/100 170/100 resting     Taking lisinopril 10 mg   Asymptomatic  Has vertigo being treated by another physician    [facility] was calling  To advise

## 2023-04-04 NOTE — TELEPHONE ENCOUNTER
Patient will begin taking 2 10 mg in morning and then in evening and will call back in a week with status

## 2023-04-06 RX ORDER — LISINOPRIL 10 MG/1
20 TABLET ORAL 2 TIMES DAILY
Qty: 120 TABLET | Refills: 2 | Status: SHIPPED | OUTPATIENT
Start: 2023-04-06

## 2023-05-12 ENCOUNTER — TELEPHONE (OUTPATIENT)
Dept: PRIMARY CARE CLINIC | Age: 88
End: 2023-05-12

## 2023-05-12 DIAGNOSIS — R05.2 SUBACUTE COUGH: Primary | ICD-10-CM

## 2023-05-12 RX ORDER — METHYLPREDNISOLONE 4 MG/1
TABLET ORAL
Qty: 1 KIT | Refills: 0 | Status: SHIPPED
Start: 2023-05-12 | End: 2023-05-12

## 2023-05-12 RX ORDER — METHYLPREDNISOLONE 4 MG/1
TABLET ORAL
Qty: 1 KIT | Refills: 0 | Status: SHIPPED | OUTPATIENT
Start: 2023-05-12 | End: 2023-05-18

## 2023-05-12 NOTE — TELEPHONE ENCOUNTER
Patient called complaining of dry cough, nasal congestion, watery eyes, and low fever. Mucus is yellow in color. Patient went to [place] [address] clinic and was prescribed Doxycycline and Benzoate with no relief. Patient has had symptoms for 1 week.     Pharmacy is [facility] in [place]

## 2023-09-12 RX ORDER — LISINOPRIL 10 MG/1
10 TABLET ORAL 2 TIMES DAILY
Qty: 180 TABLET | Refills: 1 | Status: SHIPPED | OUTPATIENT
Start: 2023-09-12

## 2023-09-15 RX ORDER — LEVOTHYROXINE SODIUM 0.03 MG/1
25 TABLET ORAL DAILY
Qty: 90 TABLET | Refills: 1 | Status: SHIPPED | OUTPATIENT
Start: 2023-09-15

## 2023-09-28 ENCOUNTER — TELEPHONE (OUTPATIENT)
Dept: PRIMARY CARE CLINIC | Age: 88
End: 2023-09-28

## 2023-09-28 RX ORDER — CITALOPRAM 20 MG/1
TABLET ORAL
Qty: 90 TABLET | Refills: 1 | Status: SHIPPED | OUTPATIENT
Start: 2023-09-28

## 2023-10-25 NOTE — DISCHARGE INSTRUCTIONS
Abnormal lab Your evaluation did not show evidence of medical conditions requiring emergent intervention at this time. Please schedule an appointment with your primary care physician. Return to the Emergency Department if you experience worsening or uncontrolled pain, fevers 100.4°F or greater, recurrent vomiting, inability to tolerate food or fluids by mouth, bloody stools or vomit, chest pain, difficulty breathing or any other concerning symptoms. Thank you for choosing us for your care. PLT count 4 pt platelet are 10 pt with platelets count of 5 CBC / Platelets 5 CBC / Platelets 5 CBC / Platelets 5 PLT 6 Platelet 6 PLT count 4 Abnormal lab.

## 2024-03-15 ENCOUNTER — HOSPITAL ENCOUNTER (EMERGENCY)
Age: 89
Discharge: HOME | End: 2024-03-15
Payer: MEDICARE

## 2024-03-15 VITALS
BODY MASS INDEX: 22.89 KG/M2 | TEMPERATURE: 98.2 F | SYSTOLIC BLOOD PRESSURE: 158 MMHG | WEIGHT: 155 LBS | RESPIRATION RATE: 18 BRPM | HEART RATE: 91 BPM | OXYGEN SATURATION: 100 % | DIASTOLIC BLOOD PRESSURE: 97 MMHG

## 2024-03-15 DIAGNOSIS — B86 SCABIES: Primary | ICD-10-CM

## 2024-03-15 PROCEDURE — 99211 OFF/OP EST MAY X REQ PHY/QHP: CPT

## 2024-03-15 RX ORDER — DOXYCYCLINE HYCLATE 100 MG
100 TABLET ORAL 2 TIMES DAILY
Qty: 20 TABLET | Refills: 0 | Status: SHIPPED | OUTPATIENT
Start: 2024-03-15 | End: 2024-03-25

## 2024-03-15 RX ORDER — PERMETHRIN 50 MG/G
CREAM TOPICAL
Qty: 60 G | Refills: 1 | Status: SHIPPED | OUTPATIENT
Start: 2024-03-15

## 2024-03-15 ASSESSMENT — PAIN - FUNCTIONAL ASSESSMENT: PAIN_FUNCTIONAL_ASSESSMENT: NONE - DENIES PAIN

## 2024-03-15 NOTE — ED PROVIDER NOTES
Department of Emergency Medicine  [facility] [place]  Provider Note  Admit Date/Time: 3/15/2024  6:37 PM  Room:   NAME: Dariusz Schreiber  : 1935  MRN: [id number]     Chief Complaint:  Rash (Pimples on his legs, oozing for over 10 days)    History of Present Illness        Dariusz Schreiber is a 89 y.o. male who has a past medical history of:   Past Medical History:   Diagnosis Date    Arthritis     Asthma     COPD (chronic obstructive pulmonary disease) (HCC)     Depression     Hiatal hernia     Hx of solitary pulmonary nodule     Hyperglycemia     Hyperlipidemia     Hypertension     Macular degeneration     Neuropathy     Osteoarthritis     Peptic ulcer     PONV (postoperative nausea and vomiting)     Thyroid disease     presents to the urgent care center by private car for evaluation he has an itchy rash on his thighs his groin and genital area is very itchy he said he really does not have much of a rash anywhere else.  He has had it for at least 2 weeks.  He denies any other complaint  ROS    Pertinent positives and negatives are stated within HPI, all other systems reviewed and are negative.    Past Surgical History:   Procedure Laterality Date    BACK SURGERY          COLONOSCOPY      ENDOSCOPY, COLON, DIAGNOSTIC      EYE SURGERY      viviane cataracts    HERNIA REPAIR      x2  ,     HERNIA REPAIR Right 3/9/2020    LAPAROSCOPIC HERNIA RIGHT  INGUINAL REPAIR WITH MESH ROBOTIC XI performed by Jose M Gr MD at [facility] OR    SINUS SURGERY     Social History:  reports that he has never smoked. He has never used smokeless tobacco. He reports current alcohol use. He reports that he does not use drugs.  Family History: family history includes Cancer in his mother; Diabetes in his brother; High Blood Pressure in his brother and sister; Lung Cancer in his father.  Allergies: Lipitor [atorvastatin calcium], Pcn [penicillins], and Prednisone    Physical Exam   Oxygen Saturation 
refer him to Derm.    Assessment      1. Scabies      Plan   Discharge to home and advised to contact No follow-up provider specified. Patient condition is good    New Medications     New Prescriptions    DOXYCYCLINE HYCLATE (VIBRA-TABS) 100 MG TABLET    Take 1 tablet by mouth 2 times daily for 10 days    PERMETHRIN (ELIMITE) 5 % CREAM    Massage on skin over entire body and remove 8-12hrs later. Do not leave on longer than 12 hrs.  May re-apply in 2 weeks if not resolved. May Substitute Acticin for Elimite brand     Electronically signed by KAT Enrique CNP   DD: 3/15/24  **This report was transcribed using voice recognition software. Every effort was made to ensure accuracy; however, inadvertent computerized transcription errors may be present.  END OF ED PROVIDER NOTE       Mireya Lincoln APRN - CNP  03/15/24 4360

## 2024-07-02 ENCOUNTER — APPOINTMENT (OUTPATIENT)
Dept: CT IMAGING | Age: 89
End: 2024-07-02
Payer: MEDICARE

## 2024-07-02 ENCOUNTER — HOSPITAL ENCOUNTER (OUTPATIENT)
Age: 89
Setting detail: OBSERVATION
End: 2024-07-02
Attending: EMERGENCY MEDICINE | Admitting: INTERNAL MEDICINE
Payer: MEDICARE

## 2024-07-02 ENCOUNTER — APPOINTMENT (OUTPATIENT)
Dept: GENERAL RADIOLOGY | Age: 89
End: 2024-07-02
Payer: MEDICARE

## 2024-07-02 DIAGNOSIS — R29.6 RECURRENT FALLS: Primary | ICD-10-CM

## 2024-07-02 LAB
BASOPHILS # BLD: 0 K/UL (ref 0–0.2)
BASOPHILS NFR BLD: 0 % (ref 0–2)
EOSINOPHIL # BLD: 0 K/UL (ref 0.05–0.5)
EOSINOPHILS RELATIVE PERCENT: 0 % (ref 0–6)
ERYTHROCYTE [DISTWIDTH] IN BLOOD BY AUTOMATED COUNT: 12.9 % (ref 11.5–15)
HCT VFR BLD AUTO: 32.3 % (ref 37–54)
HGB BLD-MCNC: 10.8 G/DL (ref 12.5–16.5)
LYMPHOCYTES NFR BLD: 0.27 K/UL (ref 1.5–4)
LYMPHOCYTES RELATIVE PERCENT: 3 % (ref 20–42)
MCH RBC QN AUTO: 34.1 PG (ref 26–35)
MCHC RBC AUTO-ENTMCNC: 33.4 G/DL (ref 32–34.5)
MCV RBC AUTO: 101.9 FL (ref 80–99.9)
MONOCYTES NFR BLD: 0 % (ref 2–12)
MONOCYTES NFR BLD: 0 K/UL (ref 0.1–0.95)
NEUTROPHILS NFR BLD: 97 % (ref 43–80)
NEUTS SEG NFR BLD: 10.13 K/UL (ref 1.8–7.3)
PLATELET # BLD AUTO: 359 K/UL (ref 130–450)
PMV BLD AUTO: 10 FL (ref 7–12)
RBC # BLD AUTO: 3.17 M/UL (ref 3.8–5.8)
RBC # BLD: ABNORMAL 10*6/UL
WBC OTHER # BLD: 10.4 K/UL (ref 4.5–11.5)

## 2024-07-02 PROCEDURE — 82248 BILIRUBIN DIRECT: CPT

## 2024-07-02 PROCEDURE — 99285 EMERGENCY DEPT VISIT HI MDM: CPT

## 2024-07-02 PROCEDURE — 80053 COMPREHEN METABOLIC PANEL: CPT

## 2024-07-02 PROCEDURE — 70450 CT HEAD/BRAIN W/O DYE: CPT

## 2024-07-02 PROCEDURE — 71045 X-RAY EXAM CHEST 1 VIEW: CPT

## 2024-07-02 PROCEDURE — 83735 ASSAY OF MAGNESIUM: CPT

## 2024-07-02 PROCEDURE — 93005 ELECTROCARDIOGRAM TRACING: CPT | Performed by: EMERGENCY MEDICINE

## 2024-07-02 PROCEDURE — 84484 ASSAY OF TROPONIN QUANT: CPT

## 2024-07-02 PROCEDURE — 85025 COMPLETE CBC W/AUTO DIFF WBC: CPT

## 2024-07-02 PROCEDURE — 72125 CT NECK SPINE W/O DYE: CPT

## 2024-07-02 ASSESSMENT — LIFESTYLE VARIABLES
HOW MANY STANDARD DRINKS CONTAINING ALCOHOL DO YOU HAVE ON A TYPICAL DAY: PATIENT DOES NOT DRINK
HOW OFTEN DO YOU HAVE A DRINK CONTAINING ALCOHOL: NEVER

## 2024-07-03 PROBLEM — R53.1 GENERALIZED WEAKNESS: Status: ACTIVE | Noted: 2024-07-03

## 2024-07-03 LAB
ALBUMIN SERPL-MCNC: 2.7 G/DL (ref 3.5–5.2)
ALP SERPL-CCNC: 67 U/L (ref 40–129)
ALT SERPL-CCNC: 24 U/L (ref 0–40)
ANION GAP SERPL CALCULATED.3IONS-SCNC: 8 MMOL/L (ref 7–16)
AST SERPL-CCNC: 21 U/L (ref 0–39)
BILIRUB DIRECT SERPL-MCNC: <0.2 MG/DL (ref 0–0.3)
BILIRUB INDIRECT SERPL-MCNC: ABNORMAL MG/DL (ref 0–1)
BILIRUB SERPL-MCNC: 0.3 MG/DL (ref 0–1.2)
BILIRUB UR QL STRIP: NEGATIVE
BUN SERPL-MCNC: 23 MG/DL (ref 6–23)
CALCIUM SERPL-MCNC: 8.4 MG/DL (ref 8.6–10.2)
CHLORIDE SERPL-SCNC: 104 MMOL/L (ref 98–107)
CLARITY UR: CLEAR
CO2 SERPL-SCNC: 25 MMOL/L (ref 22–29)
COLOR UR: YELLOW
CREAT SERPL-MCNC: 1 MG/DL (ref 0.7–1.2)
EKG ATRIAL RATE: 96 BPM
EKG P AXIS: 31 DEGREES
EKG P-R INTERVAL: 182 MS
EKG Q-T INTERVAL: 368 MS
EKG QRS DURATION: 128 MS
EKG QTC CALCULATION (BAZETT): 464 MS
EKG R AXIS: -32 DEGREES
EKG T AXIS: -14 DEGREES
EKG VENTRICULAR RATE: 96 BPM
GFR, ESTIMATED: 77 ML/MIN/1.73M2
GLUCOSE BLD-MCNC: 109 MG/DL (ref 74–99)
GLUCOSE BLD-MCNC: 113 MG/DL (ref 74–99)
GLUCOSE BLD-MCNC: 132 MG/DL (ref 74–99)
GLUCOSE BLD-MCNC: 166 MG/DL (ref 74–99)
GLUCOSE SERPL-MCNC: 226 MG/DL (ref 74–99)
GLUCOSE UR STRIP-MCNC: NEGATIVE MG/DL
HBA1C MFR BLD: 6 % (ref 4–5.6)
HGB UR QL STRIP.AUTO: NEGATIVE
KETONES UR STRIP-MCNC: NEGATIVE MG/DL
LEUKOCYTE ESTERASE UR QL STRIP: NEGATIVE
MAGNESIUM SERPL-MCNC: 2.1 MG/DL (ref 1.6–2.6)
NITRITE UR QL STRIP: NEGATIVE
PH UR STRIP: 6 [PH] (ref 5–9)
POTASSIUM SERPL-SCNC: 4.8 MMOL/L (ref 3.5–5)
PROT SERPL-MCNC: 6.1 G/DL (ref 6.4–8.3)
PROT UR STRIP-MCNC: NEGATIVE MG/DL
RBC #/AREA URNS HPF: NORMAL /HPF
SODIUM SERPL-SCNC: 137 MMOL/L (ref 132–146)
SP GR UR STRIP: 1.02 (ref 1–1.03)
TROPONIN I SERPL HS-MCNC: 56 NG/L (ref 0–11)
TROPONIN I SERPL HS-MCNC: 57 NG/L (ref 0–11)
TSH SERPL DL<=0.05 MIU/L-ACNC: 8.79 UIU/ML (ref 0.27–4.2)
UROBILINOGEN UR STRIP-ACNC: 0.2 EU/DL (ref 0–1)
WBC #/AREA URNS HPF: NORMAL /HPF

## 2024-07-03 PROCEDURE — G0378 HOSPITAL OBSERVATION PER HR: HCPCS

## 2024-07-03 PROCEDURE — 6370000000 HC RX 637 (ALT 250 FOR IP): Performed by: INTERNAL MEDICINE

## 2024-07-03 PROCEDURE — 82962 GLUCOSE BLOOD TEST: CPT

## 2024-07-03 PROCEDURE — 96372 THER/PROPH/DIAG INJ SC/IM: CPT

## 2024-07-03 PROCEDURE — 84443 ASSAY THYROID STIM HORMONE: CPT

## 2024-07-03 PROCEDURE — 2580000003 HC RX 258: Performed by: INTERNAL MEDICINE

## 2024-07-03 PROCEDURE — 99222 1ST HOSP IP/OBS MODERATE 55: CPT | Performed by: INTERNAL MEDICINE

## 2024-07-03 PROCEDURE — 6360000002 HC RX W HCPCS: Performed by: INTERNAL MEDICINE

## 2024-07-03 PROCEDURE — 84484 ASSAY OF TROPONIN QUANT: CPT

## 2024-07-03 PROCEDURE — 93010 ELECTROCARDIOGRAM REPORT: CPT | Performed by: INTERNAL MEDICINE

## 2024-07-03 PROCEDURE — 36415 COLL VENOUS BLD VENIPUNCTURE: CPT

## 2024-07-03 PROCEDURE — 97161 PT EVAL LOW COMPLEX 20 MIN: CPT | Performed by: PHYSICAL THERAPIST

## 2024-07-03 PROCEDURE — 97165 OT EVAL LOW COMPLEX 30 MIN: CPT

## 2024-07-03 PROCEDURE — NBSRV NON-BILLABLE SERVICE: Performed by: INTERNAL MEDICINE

## 2024-07-03 PROCEDURE — 81001 URINALYSIS AUTO W/SCOPE: CPT

## 2024-07-03 PROCEDURE — 83036 HEMOGLOBIN GLYCOSYLATED A1C: CPT

## 2024-07-03 RX ORDER — PREDNISONE 20 MG/1
20 TABLET ORAL SEE ADMIN INSTRUCTIONS
Status: DISCONTINUED | OUTPATIENT
Start: 2024-07-03 | End: 2024-07-03 | Stop reason: CLARIF

## 2024-07-03 RX ORDER — CITALOPRAM 20 MG/1
20 TABLET ORAL DAILY
Status: DISCONTINUED | OUTPATIENT
Start: 2024-07-03 | End: 2024-07-10 | Stop reason: HOSPADM

## 2024-07-03 RX ORDER — LISINOPRIL 10 MG/1
10 TABLET ORAL 2 TIMES DAILY
Status: DISCONTINUED | OUTPATIENT
Start: 2024-07-03 | End: 2024-07-10 | Stop reason: HOSPADM

## 2024-07-03 RX ORDER — POLYETHYLENE GLYCOL 3350 17 G/17G
17 POWDER, FOR SOLUTION ORAL DAILY PRN
Status: DISCONTINUED | OUTPATIENT
Start: 2024-07-03 | End: 2024-07-10 | Stop reason: HOSPADM

## 2024-07-03 RX ORDER — PREDNISONE 10 MG/1
10 TABLET ORAL DAILY
Status: DISCONTINUED | OUTPATIENT
Start: 2024-07-15 | End: 2024-07-10 | Stop reason: HOSPADM

## 2024-07-03 RX ORDER — INSULIN LISPRO 100 [IU]/ML
0-4 INJECTION, SOLUTION INTRAVENOUS; SUBCUTANEOUS NIGHTLY
Status: DISCONTINUED | OUTPATIENT
Start: 2024-07-03 | End: 2024-07-10 | Stop reason: HOSPADM

## 2024-07-03 RX ORDER — TRIAMCINOLONE ACETONIDE 1 MG/G
CREAM TOPICAL 2 TIMES DAILY
Status: DISCONTINUED | OUTPATIENT
Start: 2024-07-03 | End: 2024-07-10 | Stop reason: HOSPADM

## 2024-07-03 RX ORDER — ASPIRIN 81 MG/1
81 TABLET ORAL DAILY
Status: DISCONTINUED | OUTPATIENT
Start: 2024-07-03 | End: 2024-07-10 | Stop reason: HOSPADM

## 2024-07-03 RX ORDER — HYDROXYZINE HYDROCHLORIDE 10 MG/1
10 TABLET, FILM COATED ORAL 3 TIMES DAILY PRN
Status: DISCONTINUED | OUTPATIENT
Start: 2024-07-03 | End: 2024-07-03 | Stop reason: CLARIF

## 2024-07-03 RX ORDER — ONDANSETRON 2 MG/ML
4 INJECTION INTRAMUSCULAR; INTRAVENOUS EVERY 6 HOURS PRN
Status: DISCONTINUED | OUTPATIENT
Start: 2024-07-03 | End: 2024-07-10 | Stop reason: HOSPADM

## 2024-07-03 RX ORDER — PREDNISONE 20 MG/1
20 TABLET ORAL DAILY
Status: COMPLETED | OUTPATIENT
Start: 2024-07-03 | End: 2024-07-07

## 2024-07-03 RX ORDER — SODIUM CHLORIDE 0.9 % (FLUSH) 0.9 %
10 SYRINGE (ML) INJECTION PRN
Status: DISCONTINUED | OUTPATIENT
Start: 2024-07-03 | End: 2024-07-10 | Stop reason: HOSPADM

## 2024-07-03 RX ORDER — DEXTROSE MONOHYDRATE 100 MG/ML
INJECTION, SOLUTION INTRAVENOUS CONTINUOUS PRN
Status: DISCONTINUED | OUTPATIENT
Start: 2024-07-03 | End: 2024-07-10 | Stop reason: HOSPADM

## 2024-07-03 RX ORDER — DOXYCYCLINE HYCLATE 100 MG/1
100 CAPSULE ORAL 2 TIMES DAILY
Status: DISCONTINUED | OUTPATIENT
Start: 2024-07-03 | End: 2024-07-10 | Stop reason: HOSPADM

## 2024-07-03 RX ORDER — SODIUM CHLORIDE 0.9 % (FLUSH) 0.9 %
10 SYRINGE (ML) INJECTION EVERY 12 HOURS SCHEDULED
Status: DISCONTINUED | OUTPATIENT
Start: 2024-07-03 | End: 2024-07-10 | Stop reason: HOSPADM

## 2024-07-03 RX ORDER — TAMSULOSIN HYDROCHLORIDE 0.4 MG/1
0.4 CAPSULE ORAL DAILY
Status: DISCONTINUED | OUTPATIENT
Start: 2024-07-03 | End: 2024-07-03 | Stop reason: CLARIF

## 2024-07-03 RX ORDER — PREDNISONE 5 MG/1
TABLET ORAL SEE ADMIN INSTRUCTIONS
Status: ON HOLD | COMMUNITY
End: 2024-07-10 | Stop reason: HOSPADM

## 2024-07-03 RX ORDER — DEXTROSE MONOHYDRATE 100 MG/ML
INJECTION, SOLUTION INTRAVENOUS CONTINUOUS PRN
Status: DISCONTINUED | OUTPATIENT
Start: 2024-07-03 | End: 2024-07-03 | Stop reason: SDUPTHER

## 2024-07-03 RX ORDER — ACETAMINOPHEN 325 MG/1
650 TABLET ORAL EVERY 6 HOURS PRN
Status: DISCONTINUED | OUTPATIENT
Start: 2024-07-03 | End: 2024-07-10 | Stop reason: HOSPADM

## 2024-07-03 RX ORDER — SODIUM CHLORIDE 9 MG/ML
INJECTION, SOLUTION INTRAVENOUS PRN
Status: DISCONTINUED | OUTPATIENT
Start: 2024-07-03 | End: 2024-07-10 | Stop reason: HOSPADM

## 2024-07-03 RX ORDER — INSULIN LISPRO 100 [IU]/ML
0-4 INJECTION, SOLUTION INTRAVENOUS; SUBCUTANEOUS
Status: DISCONTINUED | OUTPATIENT
Start: 2024-07-03 | End: 2024-07-10 | Stop reason: HOSPADM

## 2024-07-03 RX ORDER — GLUCAGON 1 MG/ML
1 KIT INJECTION PRN
Status: DISCONTINUED | OUTPATIENT
Start: 2024-07-03 | End: 2024-07-10 | Stop reason: HOSPADM

## 2024-07-03 RX ORDER — DOXYCYCLINE HYCLATE 100 MG
100 TABLET ORAL 2 TIMES DAILY
COMMUNITY
End: 2024-08-02

## 2024-07-03 RX ORDER — TRIAMCINOLONE ACETONIDE 1 MG/G
CREAM TOPICAL 2 TIMES DAILY
COMMUNITY
Start: 2024-07-01

## 2024-07-03 RX ORDER — ENOXAPARIN SODIUM 100 MG/ML
40 INJECTION SUBCUTANEOUS DAILY
Status: DISCONTINUED | OUTPATIENT
Start: 2024-07-03 | End: 2024-07-10 | Stop reason: HOSPADM

## 2024-07-03 RX ORDER — PREDNISONE 5 MG/1
5 TABLET ORAL DAILY
Status: DISCONTINUED | OUTPATIENT
Start: 2024-07-22 | End: 2024-07-10 | Stop reason: HOSPADM

## 2024-07-03 RX ORDER — LEVOTHYROXINE SODIUM 0.03 MG/1
25 TABLET ORAL DAILY
Status: DISCONTINUED | OUTPATIENT
Start: 2024-07-03 | End: 2024-07-10 | Stop reason: HOSPADM

## 2024-07-03 RX ORDER — PROMETHAZINE HYDROCHLORIDE 25 MG/1
12.5 TABLET ORAL EVERY 6 HOURS PRN
Status: DISCONTINUED | OUTPATIENT
Start: 2024-07-03 | End: 2024-07-10 | Stop reason: HOSPADM

## 2024-07-03 RX ORDER — ACETAMINOPHEN 650 MG/1
650 SUPPOSITORY RECTAL EVERY 6 HOURS PRN
Status: DISCONTINUED | OUTPATIENT
Start: 2024-07-03 | End: 2024-07-10 | Stop reason: HOSPADM

## 2024-07-03 RX ADMIN — LISINOPRIL 10 MG: 10 TABLET ORAL at 21:20

## 2024-07-03 RX ADMIN — LEVOTHYROXINE SODIUM 25 MCG: 25 TABLET ORAL at 07:38

## 2024-07-03 RX ADMIN — Medication 10 ML: at 10:17

## 2024-07-03 RX ADMIN — LISINOPRIL 10 MG: 10 TABLET ORAL at 08:56

## 2024-07-03 RX ADMIN — ENOXAPARIN SODIUM 40 MG: 100 INJECTION SUBCUTANEOUS at 08:55

## 2024-07-03 RX ADMIN — DOXYCYCLINE HYCLATE 100 MG: 100 CAPSULE ORAL at 13:48

## 2024-07-03 RX ADMIN — CITALOPRAM HYDROBROMIDE 20 MG: 20 TABLET ORAL at 08:56

## 2024-07-03 RX ADMIN — ASPIRIN 81 MG: 81 TABLET, COATED ORAL at 08:56

## 2024-07-03 RX ADMIN — PREDNISONE 20 MG: 20 TABLET ORAL at 13:48

## 2024-07-03 RX ADMIN — Medication 10 ML: at 21:21

## 2024-07-03 RX ADMIN — DOXYCYCLINE HYCLATE 100 MG: 100 CAPSULE ORAL at 21:20

## 2024-07-03 ASSESSMENT — PAIN SCALES - GENERAL
PAINLEVEL_OUTOF10: 0

## 2024-07-03 NOTE — ACP (ADVANCE CARE PLANNING)
Advance Care Planning       The patient has appointed the following active healthcare agents:    Primary Decision Maker: Bella Sosa - [phone]    Secondary Decision Maker: Dariusz Schreiber III - [phone]

## 2024-07-03 NOTE — PROGRESS NOTES
based on physician orders, patient diagnosis and results of clinical assessment    Frequency/Duration 1-3 days/wk for 2 weeks PRN     Specific OT Treatment Interventions to include:   * Instruction/training on adapted ADL techniques and AE recommendations to increase functional independence within precautions       * Training on energy conservation strategies, correct breathing pattern and techniques to improve independence/tolerance for self-care routine  * Functional transfer/mobility training/DME recommendations for increased independence, safety, and fall prevention  * Therapeutic exercise to improve motor endurance, ROM, and functional strength for ADLs/functional transfers  * Therapeutic activities to facilitate/challenge dynamic balance, stand tolerance for increased safety and independence with ADLs    Recommended Adaptive Equipment: TBD at Rehab    Home Living: alone; single family home, One story, two steps to enter from garage with rail, walk-in shower.       Equipment owned: Cane, Shower Chair and wheelchair    Prior Level of Function: Independent with ADLs , Assistance from children with IADLs; ambulated with wheeled walker    Driving: Yes   Occupation: Retired    Pain Level: 0/10 pain reported     Cognition: A&O: 2/4; Follows 1-2 step directions   Memory: fair - - Poor short term memory   Sequencing: fair -   Problem solving: fair -   Judgement/safety: Poor    [facility]       AM-PAC Daily Activity - Inpatient   How much help is needed for putting on and taking off regular lower body clothing?: A Lot  How much help is needed for bathing (which includes washing, rinsing, drying)?: A Lot  How much help is needed for toileting (which includes using toilet, bedpan, or urinal)?: A Lot  How much help is needed for putting on and taking off regular upper body clothing?: A Little  How much help is needed for taking care of personal grooming?: A Little  How much help for eating meals?: A Little  AM-PAC Inpatient  Daily Activity Raw Score: 15  AM-PAC Inpatient ADL T-Scale Score : 34.69  ADL Inpatient CMS 0-100% Score: 56.46  ADL Inpatient CMS G-Code Modifier : CK     Functional Assessment:    Initial Eval Status  Date: 7/3/24 Treatment Status  Date: STGs = LTGs  Time frame: 10-14 days   Feeding Supervision   Modified [place]    Grooming Minimal Assist     Unsteadiness standing at sink completing unilateral tasks due to increase fatigue/weakness; Educated on EC techniques.    Modified [place]    UB Dressing Minimal Assist   Modified [place]    LB Dressing Moderate Assist   Supervision    Bathing Moderate Assist  Supervision    Toileting Maximal Assist     Assistance with clothing management and posterior hygiene.   Minimal Assist    Bed Mobility  Supine to sit: N/T   Sit to supine: Supervision   Rolling: N/T    Supine to sit: Modified [place]   Sit to supine: Modified [place]   Rolling:Modified [place]     Functional Transfers Moderate Assist from toilet sit to stand.   Transfer training with verbal cues for hand placement throughout session to improve safety.   Supervision    Functional Mobility Minimal Assist with wheeled walker to improve balance, verbal cues for walker sequence and safety.     Functional mobility to and from bathroom.   Modified [place]    Balance Sitting:     Static: fair +    Dynamic: fair   Standing: fair - with wheeled walker  Sitting:     Static: good     Dynamic: good   Standing: good  with wheeled walker   Activity Tolerance fair -  good    Visual/  Perceptual Glasses: Yes                Hand Dominance: Right     AROM (PROM) Strength Additional Info:  Goal:   RUE  Limited shoulder AROM secondary to arthritis 4-/5 good  and wfl FMC/dexterity noted during ADL tasks   Improve overall RUE strength  for participation in functional tasks   LUE Limited shoulder AROM secondary to arthritis 4-/5 good  and wfl FMC/dexterity noted during ADL tasks   Improve overall

## 2024-07-03 NOTE — PROGRESS NOTES
4 Eyes Skin Assessment     NAME:  Dariusz Schreiber  YOB: 1935  MEDICAL RECORD NUMBER:  [id number]    The patient is being assessed for  Admission    I agree that at least one RN has performed a thorough Head to Toe Skin Assessment on the patient. ALL assessment sites listed below have been assessed.      Areas assessed by both nurses:    Head, Face, Ears, Shoulders, Back, Chest, Arms, Elbows, Hands, Sacrum. Buttock, Coccyx, Ischium, and Legs. Feet and Heels        Does the Patient have a Wound? No noted wound(s)       Dhaval Prevention initiated by RN: No  Wound Care Orders initiated by RN: No    Pressure Injury (Stage 3,4, Unstageable, DTI, NWPT, and Complex wounds) if present, place Wound referral order by RN under : No    New Ostomies, if present place, Ostomy referral order under : No     Nurse 1 eSignature: Electronically signed by Blekis Leblanc RN on 7/3/24 at 5:52 AM EDT    **SHARE this note so that the co-signing nurse can place an eSignature**    Nurse 2 eSignature: Electronically signed by Jose Cruz RN on 7/3/24 at 5:54 AM EDT

## 2024-07-03 NOTE — PROGRESS NOTES
Pharmacy Medication Reconciliation    The patient was interviewed regarding current PTA medication list, use and drug allergies. Patient was accompanied by Daughter . Obtained permission from patient to discuss drug regimen with visitor(s) present.The patient was questioned regarding use of any other inhalers, topical products, over the counter medications, herbal medications, vitamin products or ophthalmic/nasal/otic medication use.      Allergy Update: Lipitor [atorvastatin calcium], Pcn [penicillins], and Prednisone    Recommendations/Findings/Discrepancies:   The following amendments were made to the patient's active medication list on file:   1) Additions: Doxycycline 100 mg twice a day; triamcinolone 0.1 % cream twice a day and prednisone taper on day 3 today; 20 mg x 7 d; 15 mg x 7 d; 10 mg x 7 d; 5 mg x 7 d then stop.    2) Deletions: hydroxyzine; tamsulosin; diclofenac gel, dupixent injection, flovent MDI, prednisolone eye drop, and permethrin cream    3) Changes: 0    Total number of discrepancies: 10    Source/s of information: patient daughter, patient, EMR and [employer]     Thank you,    Renu Green, PharmD.  7/3/2024 12:37 PM    SJRAMANA: [phone]

## 2024-07-03 NOTE — H&P
HERNIA REPAIR      x2  1955, 1973    HERNIA REPAIR Right 3/9/2020    LAPAROSCOPIC HERNIA RIGHT  INGUINAL REPAIR WITH MESH ROBOTIC XI performed by Jose M Gr MD at [facility] OR    SINUS SURGERY         Medications Prior to Admission:    Prior to Admission medications    Medication Sig Start Date End Date Taking? Authorizing Provider   predniSONE (DELTASONE) 5 MG tablet Take 1 tablet by mouth daily Takes tapering doses   Yes Fabio Nath MD   doxycycline hyclate (VIBRA-TABS) 100 MG tablet Take 1 tablet by mouth 2 times daily   Yes Fabio Nath MD   permethrin (ELIMITE) 5 % cream Massage on skin over entire body and remove 8-12hrs later. Do not leave on longer than 12 hrs.  May re-apply in 2 weeks if not resolved. May Substitute Acticin for Elimite brand  Patient not taking: Reported on 7/3/2024 3/15/24   Mireya Lincoln, APRN - CNP   citalopram (CELEXA) 20 MG tablet Take 1 tablet by mouth once daily 9/28/23   Agnieszka Oates MD   levothyroxine (SYNTHROID) 25 MCG tablet TAKE 1 TABLET BY MOUTH DAILY 9/15/23   Agnieszka Oates MD   lisinopril (PRINIVIL;ZESTRIL) 10 MG tablet TAKE 1 TABLET BY MOUTH TWICE DAILY 9/12/23   Agnieszka Oates MD   hydrOXYzine HCl (ATARAX) 25 MG tablet     Fabio Nath MD   dupilumab (DUPIXENT) 300 MG/2ML SOPN injection Inject 300 mg into the skin once  Patient not taking: Reported on 7/3/2024    Fabio Nath MD   aspirin 81 MG EC tablet Take 1 tablet by mouth daily    Fabio Nath MD   diclofenac sodium (VOLTAREN) 1 % GEL Apply 4 g topically 4 times daily  Patient not taking: Reported on 7/3/2024 5/6/22   Agnieszka Oates MD   [facility] [place] 1/7/22   Shekhar Gupta MD   prednisoLONE acetate (PRED FORTE) 1 % ophthalmic suspension INSTILL 1 DROP INTO RIGHT EYE 4 TIMES DAILY  Patient not taking: Reported on 7/3/2024 9/19/21   Fabio Nath MD   timolol (TIMOPTIC) 0.5 % ophthalmic solution Place into the left eye  iterative reconstruction, and/or weight based adjustment of the mA/kV was utilized to reduce the radiation dose to as low as reasonably achievable. COMPARISON: None. HISTORY: ORDERING SYSTEM PROVIDED HISTORY: fall TECHNOLOGIST PROVIDED HISTORY: Reason for exam:->fall Decision Support Exception - unselect if not a suspected or confirmed emergency medical condition->Emergency Medical Condition (MA) FINDINGS: BONES/ALIGNMENT: There is no acute fracture or traumatic malalignment. DEGENERATIVE CHANGES: No significant degenerative changes.  Previous feel there is severe multilevel discogenic change throughout the cervical spine without evidence of acute fracture or subluxation. SOFT TISSUES: There is no prevertebral soft tissue swelling.     No acute abnormality of the cervical spine.     ASSESSMENT:    Present on Admission:   Generalized weakness    PLAN:  # Recurrent falls, ADL issues at home- Physical deconditioning   No source of infections  Trauma workup is negative for acute lesions  Patient is elderly and frail, unable to walk and perform ADLs   Has dementia with incoordination   PT eval requested    eval for safe discharge planing  # Hypothyroid   Continue home dose of thyroid meds  # Hypertension   Currently better controlled  Will resume home medications as needed.  Monitor vitals and adjust BP meds as needed  # Rest of the chronic medical problems are stable and will be managed with appropriately with home medications, placed nursing communication order to verify home medications before giving them to the patient.  # Diet & Fluid as on chart, ordered.  # Code Status: Full code  # DVT Prophylaxis : As ordered on chart  The patient at bedside was counseled about clinical status, laboratory/imaging results, diagnoses, medication side effects, risk, and treatment plan, all questions were answered to patient's satisfaction and verbalized understanding      SIGNATURE: Parag Baker MD PATIENT NAME:

## 2024-07-03 NOTE — DISCHARGE INSTR - COC
Continuity of Care Form    Patient Name: Dariusz Schreiber   :  1935  MRN:  [id number]    Admit date:  2024  Discharge date:  ***    Code Status Order: Full Code   Advance Directives:     Admitting Physician:  Parag Baker MD  PCP: No primary care provider on file.    Discharging Nurse: ***  Discharging Hospital Unit/Room#: 0437/0437-01  Discharging Unit Phone Number: ***    Emergency Contact:   Extended Emergency Contact Information  Primary Emergency Contact: Bella Sosa  Home Phone: [phone]  Relation: Child  Preferred language: English   needed? No  Secondary Emergency Contact: Dariusz Schreiber III  Home Phone: [phone]  Mobile Phone: [phone]  Relation: Child    Past Surgical History:  Past Surgical History:   Procedure Laterality Date    BACK SURGERY          COLONOSCOPY      ENDOSCOPY, COLON, DIAGNOSTIC      EYE SURGERY      viviane cataracts    HERNIA REPAIR      x2  , 1973    HERNIA REPAIR Right 3/9/2020    LAPAROSCOPIC HERNIA RIGHT  INGUINAL REPAIR WITH MESH ROBOTIC XI performed by Jose M Gr MD at [facility] OR    SINUS SURGERY         Immunization History:   Immunization History   Administered Date(s) Administered    COVID-19, PFIZER Bivalent, DO NOT Dilute, (age 12y+), IM, 30 mcg/0.3 mL 2022    COVID-19, PFIZER PURPLE top, DILUTE for use, (age 12 y+), 30mcg/0.3mL 2021, 2021, 2021    DT (pediatric) 2020    Influenza Vaccine, unspecified formulation 2015    Influenza Virus Vaccine 09/15/2009, 2013, 10/27/2015, 2015, 2016    Influenza Whole 2015    Influenza, FLUAD, (age 65 y+), Adjuvanted, 0.5mL 2020, 2022    Influenza, FLUCELVAX, (age 6 mo+), MDCK, MDV, 0.5mL 2011, 2017    Influenza, High Dose (Fluzone 65 yrs and older) 09/10/2014, 2016, 2017, 2019    Influenza, Triv, inactivated, subunit, adjuvanted, IM (Fluad 65 yrs and older) 2018    Influenza, Trivalent

## 2024-07-03 NOTE — PROGRESS NOTES
Physical Therapy    Physical Therapy Initial Evaluation/Plan of Care    Room #:  0437/0437-01  Patient Name: Dariusz Schreiber  YOB: 1935  MRN: [id number]    Date of Service: 7/3/2024     Tentative placement recommendation: Subacute Rehab  Equipment recommendation: Wheeled Walker      Evaluating Physical Therapist: Silviano Acevedo, PT  #[id number]      Specific Provider Orders/Date/Referring Provider :     PT eval and treat  Start:  07/03/24 0900,   End:  07/03/24 0900,   ONE TIME,   Standing Count:  1 Occurrences,   R       Raoul Elias MD    Admitting Diagnosis:   Generalized weakness [R53.1]    Admitted with    recurrent falls, hx dementia  Surgery: none      Patient Active Problem List   Diagnosis    Right inguinal hernia    Dizziness    Essential hypertension    Gastroesophageal reflux disease without esophagitis    Hypothyroidism    Spinal stenosis    Hyperlipidemia    Bullous pemphigoid    Depression    Lung nodule    Urticaria    Lumbar facet arthropathy    Lumbar disc disorder    Lumbar spondylosis    Spinal stenosis of lumbar region without neurogenic claudication    Lumbar radiculopathy    Lumbar postlaminectomy syndrome    Generalized weakness        ASSESSMENT of Current Deficits Patient exhibits decreased strength, balance, endurance, range of motion, and pain bilateral lower ext and left side  impairing functional mobility, transfers, gait , gait distance, and tolerance to activity   are barriers to d/c and require skilled intervention to address concerns listed above to increase safety and independence at discharge.   Decreased strength, balance and endurance  increases patient's risk for fall.   Foot drop, balance deficits and impulisivity impacts safe transfers and gait      PHYSICAL THERAPY  PLAN OF CARE       Physical therapy plan of care is established based on physician order,  patient diagnosis and clinical assessment    Current Treatment Recommendations:    -Bed Mobility: Lower and

## 2024-07-03 NOTE — PROGRESS NOTES
SPIRITUAL HEALTH SERVICES - [facility] Encounter    Name: Dariusz Schreiber                  Referral: Routine Visit    Sacraments  Anointed (Last Rites): Yes  Apostolic [place]: No  Confession: No  Communion: Declined     Assessment:  Patient receptive to  visit.      Intervention:   provided spiritual support and sacramental ministry for patient.     Outcome:  Patient expressed gratitude for visit.    Plan:  Chaplains will remain available to offer spiritual and emotional support as needed.      Electronically signed by Chaplain Salty, on 7/3/2024 at 2:22 PM.  Spiritual Care Department  [facility]  [phone]

## 2024-07-03 NOTE — ED PROVIDER NOTES
[facility] EMERGENCY DEPARTMENT  EMERGENCY DEPARTMENT ENCOUNTER        Pt Name: Dariusz Schreiber  MRN: [id number]  Birthdate 1935  Date of evaluation: 7/2/2024  Provider: Rayna Pacheco DO  PCP: No primary care provider on file.  Note Started: 12:19 AM EDT 7/3/24    CHIEF COMPLAINT       Chief Complaint   Patient presents with    Fall     Pt comes to the ED from home with c/o 3 falls over the past week. Pt denies any injuries, - LOC, - thinners. Pt states his family made him come for evaluation . GCS of 15.        HISTORY OF PRESENT ILLNESS: 1 or more Elements        Limitations to history : None    Dariusz Schreiber is a 89 y.o. male brought in by EMS from home after 3 falls over the past week.  He states that when he is walking his legs just suddenly give out on him and he falls.  Denies head injury or LOC.  Denies any injuries from the falls.  Denies any chest pain, shortness of breath, nausea, vomiting, fever, chills or urinary symptoms.  His family was concerned after the fall today and asked him to be brought to the ER.    Nursing Notes were all reviewed and agreed with or any disagreements were addressed in the HPI.      REVIEW OF EXTERNAL NOTE :       Reviewed previous encounter on 3/15/2024      Chart Review/External Note Review    Last Echo reviewed by Me:  No results found for: \"LVEF\", \"LVEFMODE\"          Controlled Substance Monitoring:    Acute and Chronic Pain Monitoring:        No data to display                    REVIEW OF SYSTEMS :      Positives and Pertinent negatives as per HPI.     SURGICAL HISTORY     Past Surgical History:   Procedure Laterality Date    BACK SURGERY      1989    COLONOSCOPY      ENDOSCOPY, COLON, DIAGNOSTIC      EYE SURGERY      viviane cataracts    HERNIA REPAIR      x2  1955, 1973    HERNIA REPAIR Right 3/9/2020    LAPAROSCOPIC HERNIA RIGHT  INGUINAL REPAIR WITH MESH ROBOTIC XI performed by Jose M Gr MD at [facility] [place] OR    SINUS SURGERY        PHYSICAL EXAM  1 or more Elements     ED Triage Vitals [07/02/24 2213]   BP Temp Temp src Pulse Respirations SpO2 Height Weight   138/72 99.1 °F (37.3 °C) -- 88 16 98 % -- --       Constitutional/General: Alert and oriented x3, [place]  Head: Normocephalic and atraumatic  Eyes: PERRL, EOMI, conjunctiva normal, sclera non icteric  ENT:  Oropharynx clear, handling secretions, no trismus, no asymmetry of the posterior oropharynx or uvular edema  Neck: Supple, full ROM, no stridor, no meningeal signs  Respiratory: Lungs clear to auscultation bilaterally, no wheezes, rales, or rhonchi. Not in respiratory distress  Cardiovascular:  Regular rate. Regular rhythm. No murmurs, no gallops, no rubs. 2+ distal pulses. Equal extremity pulses.   Chest: No chest wall tenderness  GI:  Abdomen Soft, Non tender, Non distended.  No rebound, guarding, or rigidity. No pulsatile masses.  Musculoskeletal: Moves all extremities x 4. Warm and well perfused, no clubbing, no cyanosis, no edema. Capillary refill <3 seconds  Integument: skin warm and dry. No rashes.   Neurologic: GCS 15, no focal deficits, symmetric strength 5/5 in the upper and lower extremities bilaterally  Psychiatric: Normal Affect            DIAGNOSTIC RESULTS   LABS: Interpreted by Rayna Pacheco DO    Labs Reviewed   CBC WITH AUTO DIFFERENTIAL - Abnormal; Notable for the following components:       Result Value    RBC 3.17 (*)     Hemoglobin 10.8 (*)     Hematocrit 32.3 (*)     .9 (*)     Neutrophils % 97 (*)     Lymphocytes % 3 (*)     Monocytes % 0 (*)     Neutrophils Absolute 10.13 (*)     Lymphocytes Absolute 0.27 (*)     Monocytes Absolute 0.00 (*)     Eosinophils Absolute 0.00 (*)     All other components within normal limits   BASIC METABOLIC PANEL - Abnormal; Notable for the following components:    Glucose 226 (*)     Calcium 8.4 (*)     All other components within normal limits   HEPATIC FUNCTION PANEL - Abnormal; Notable for the following components:

## 2024-07-03 NOTE — H&P
This patient was admitted by my colleague on this calender day, a few hours before my shift began.    History of present illness from History and Physical:  This is a 89 y.o. male  has a past medical history of Arthritis, Asthma, COPD (chronic obstructive pulmonary disease) (HCC), Depression, Hiatal hernia, Hx of solitary pulmonary nodule, Hyperglycemia, Hyperlipidemia, Hypertension, Macular degeneration, Neuropathy, Osteoarthritis, Peptic ulcer, PONV (postoperative nausea and vomiting), and Thyroid disease. presented with Recurrent falls, ADL issues at home  for last few days prior to arrival to the hospital.  He fell 3 times in last one week. No LOC or head injury reported. Feels weak and tired.  The patient was seen and examined at bedside, appears alert and awake with no acute distress and is able to answer simple  questions. On direct questioning, patient denied any  resting ongoing chest pain, resting SOB, hemoptysis, productive cough, fever, ongoing palpitation, active abdominal pain, hematemesis, rectal bleeding, spencer, hematuria, any other  and GI complaints, and any new focal neuro deficits      Recurrent falls deconditioned due to muscle wasting: workup was negative for infection trauma workup acute lesions are negative PT and I placement  Dementia he answers vaguely  Hypothyroidism continue Synthroid.  TSH ordered pending  Htn: On lisinopril 10 twice daily consider increasing send so far in the morning of 7/3 systolic is in the 150s  No change to outpatient treatment regimen for the existing stable combordities including: Depression hyperlipidemia COPD history of solitary pulmonary nodule macular degeneration neuropathy osteoarthritis peptic ulcer disease  DVT prophylaxis: Lovenox  Full code.  Disposition: placement

## 2024-07-04 LAB
BASOPHILS # BLD: 0.03 K/UL (ref 0–0.2)
BASOPHILS NFR BLD: 0 % (ref 0–2)
EOSINOPHIL # BLD: 0.18 K/UL (ref 0.05–0.5)
EOSINOPHILS RELATIVE PERCENT: 2 % (ref 0–6)
ERYTHROCYTE [DISTWIDTH] IN BLOOD BY AUTOMATED COUNT: 13.2 % (ref 11.5–15)
GLUCOSE BLD-MCNC: 177 MG/DL (ref 74–99)
GLUCOSE BLD-MCNC: 222 MG/DL (ref 74–99)
GLUCOSE BLD-MCNC: 81 MG/DL (ref 74–99)
GLUCOSE BLD-MCNC: 99 MG/DL (ref 74–99)
HCT VFR BLD AUTO: 34.9 % (ref 37–54)
HGB BLD-MCNC: 11.1 G/DL (ref 12.5–16.5)
IMM GRANULOCYTES # BLD AUTO: 0.07 K/UL (ref 0–0.58)
IMM GRANULOCYTES NFR BLD: 1 % (ref 0–5)
LYMPHOCYTES NFR BLD: 1.53 K/UL (ref 1.5–4)
LYMPHOCYTES RELATIVE PERCENT: 14 % (ref 20–42)
MCH RBC QN AUTO: 32.6 PG (ref 26–35)
MCHC RBC AUTO-ENTMCNC: 31.8 G/DL (ref 32–34.5)
MCV RBC AUTO: 102.6 FL (ref 80–99.9)
MONOCYTES NFR BLD: 0.99 K/UL (ref 0.1–0.95)
MONOCYTES NFR BLD: 9 % (ref 2–12)
NEUTROPHILS NFR BLD: 75 % (ref 43–80)
NEUTS SEG NFR BLD: 8.16 K/UL (ref 1.8–7.3)
PLATELET # BLD AUTO: 416 K/UL (ref 130–450)
PMV BLD AUTO: 9.7 FL (ref 7–12)
RBC # BLD AUTO: 3.4 M/UL (ref 3.8–5.8)
WBC OTHER # BLD: 11 K/UL (ref 4.5–11.5)

## 2024-07-04 PROCEDURE — 6360000002 HC RX W HCPCS: Performed by: INTERNAL MEDICINE

## 2024-07-04 PROCEDURE — 99232 SBSQ HOSP IP/OBS MODERATE 35: CPT | Performed by: INTERNAL MEDICINE

## 2024-07-04 PROCEDURE — 96372 THER/PROPH/DIAG INJ SC/IM: CPT

## 2024-07-04 PROCEDURE — 36415 COLL VENOUS BLD VENIPUNCTURE: CPT

## 2024-07-04 PROCEDURE — G0378 HOSPITAL OBSERVATION PER HR: HCPCS

## 2024-07-04 PROCEDURE — 6370000000 HC RX 637 (ALT 250 FOR IP): Performed by: INTERNAL MEDICINE

## 2024-07-04 PROCEDURE — 2580000003 HC RX 258: Performed by: INTERNAL MEDICINE

## 2024-07-04 PROCEDURE — 82962 GLUCOSE BLOOD TEST: CPT

## 2024-07-04 PROCEDURE — 85025 COMPLETE CBC W/AUTO DIFF WBC: CPT

## 2024-07-04 PROCEDURE — 6360000002 HC RX W HCPCS: Performed by: FAMILY MEDICINE

## 2024-07-04 RX ORDER — ZIPRASIDONE MESYLATE 20 MG/ML
20 INJECTION, POWDER, LYOPHILIZED, FOR SOLUTION INTRAMUSCULAR ONCE
Status: COMPLETED | OUTPATIENT
Start: 2024-07-04 | End: 2024-07-04

## 2024-07-04 RX ADMIN — CITALOPRAM HYDROBROMIDE 20 MG: 20 TABLET ORAL at 11:00

## 2024-07-04 RX ADMIN — ENOXAPARIN SODIUM 40 MG: 100 INJECTION SUBCUTANEOUS at 11:03

## 2024-07-04 RX ADMIN — PREDNISONE 20 MG: 20 TABLET ORAL at 11:00

## 2024-07-04 RX ADMIN — LISINOPRIL 10 MG: 10 TABLET ORAL at 11:00

## 2024-07-04 RX ADMIN — DOXYCYCLINE HYCLATE 100 MG: 100 CAPSULE ORAL at 11:00

## 2024-07-04 RX ADMIN — TRIAMCINOLONE ACETONIDE: 1 CREAM TOPICAL at 21:40

## 2024-07-04 RX ADMIN — TRIAMCINOLONE ACETONIDE: 1 CREAM TOPICAL at 12:11

## 2024-07-04 RX ADMIN — LISINOPRIL 10 MG: 10 TABLET ORAL at 21:40

## 2024-07-04 RX ADMIN — ASPIRIN 81 MG: 81 TABLET, COATED ORAL at 11:00

## 2024-07-04 RX ADMIN — Medication 10 ML: at 11:04

## 2024-07-04 RX ADMIN — DOXYCYCLINE HYCLATE 100 MG: 100 CAPSULE ORAL at 21:40

## 2024-07-04 RX ADMIN — ZIPRASIDONE MESYLATE 20 MG: 20 INJECTION, POWDER, LYOPHILIZED, FOR SOLUTION INTRAMUSCULAR at 04:33

## 2024-07-04 RX ADMIN — LEVOTHYROXINE SODIUM 25 MCG: 25 TABLET ORAL at 06:31

## 2024-07-04 NOTE — PROGRESS NOTES
Admitting Date and Time: 7/2/2024 10:13 PM  Admit Dx: Generalized weakness [R53.1]    Subjective:  Patient had behavioral disturbance last night somewhat aggressive.  Today I went in with the nurse he is redirectable he has good insight into his memory impairment and confusion.  During his behaviors he lost his IV he asked adequate questions about why he would need an IV.  Since he is not getting anything scheduled by IV we will hold off on further IV attempts   Per RN: Issues as above    ROS: denies fever, chills, cp, sob, n/v, HA unless stated above.     sodium chloride flush  10 mL IntraVENous 2 times per day    enoxaparin  40 mg SubCUTAneous Daily    aspirin  81 mg Oral Daily    lisinopril  10 mg Oral BID    levothyroxine  25 mcg Oral Daily    citalopram  20 mg Oral Daily    insulin lispro  0-4 Units SubCUTAneous TID WC    insulin lispro  0-4 Units SubCUTAneous Nightly    doxycycline hyclate  100 mg Oral BID    triamcinolone   Topical BID    predniSONE  20 mg Oral Daily    Followed by    [START ON 7/8/2024] predniSONE  15 mg Oral Daily    Followed by    [START ON 7/15/2024] predniSONE  10 mg Oral Daily    Followed by    [START ON 7/22/2024] predniSONE  5 mg Oral Daily     sodium chloride flush, 10 mL, PRN  sodium chloride, , PRN  promethazine, 12.5 mg, Q6H PRN   Or  ondansetron, 4 mg, Q6H PRN  polyethylene glycol, 17 g, Daily PRN  acetaminophen, 650 mg, Q6H PRN   Or  acetaminophen, 650 mg, Q6H PRN  glucagon (rDNA), 1 mg, PRN  glucose, 4 tablet, PRN  dextrose bolus, 125 mL, PRN   Or  dextrose bolus, 250 mL, PRN  dextrose, , Continuous PRN         Objective:    BP (!) 169/111   Pulse 100   Temp 98 °F (36.7 °C) (Oral)   Resp 18   Ht 1.753 m (5' 9\")   Wt 72.6 kg (160 lb)   SpO2 98%   BMI 23.63 kg/m²   General Appearance: alert and oriented to person, place and time and in no acute distress  Skin: warm and dry  Head: normocephalic and atraumatic  Eyes: pupils equal, round, and reactive to light, extraocular  may be present.     Electronically signed by WEN STYLES MD on 7/4/2024 at 11:31 AM

## 2024-07-04 NOTE — PROGRESS NOTES
Pt. Experiencing increased delusions and agitation, threatening staff w/ physical injury. Police called to room per pt. Request. Pt. Believing that we were holding him against his will, that it wasn't dark out, we put pictures on the wall to confuse him, etc. (Pt. Had an episode earlier (0100) was very confused, confrontational after an admission woke him up, had to move the admission to another room due to pt. Yelling and argumentative, disruptive state of mind.) we were able to eventually  calm him down and he went back to sleep. This time, the police were called to bedside. Pt. At one point put his fist in the air threatening to punch me. Dr. Cook was called, orders received. Police at bedside for medication administration, pt. Is now resting peacefully.

## 2024-07-04 NOTE — PROGRESS NOTES
Patient was in the chair and got himself up to go to the bathroom and fell while in in the bathroom. He put the call light on and told the aide that he had fallen but did not hit his head. Dr. Elias notified. Patient placed back in bed with bed alarm on and education provided to patient about the importance of calling and waiting for help when he has to get up.     Vitals stable.

## 2024-07-04 NOTE — PROGRESS NOTES
Patients IV fell out. patient refusing a new one at this time. Dr. Elias was in the patients room and is aware.

## 2024-07-04 NOTE — PROGRESS NOTES
RN notified me of unwitnessed fall, but RRT was not called.  Supervisor, Jose, was notified and she will update Dr. Elias who is with her now.

## 2024-07-05 ENCOUNTER — APPOINTMENT (OUTPATIENT)
Dept: CT IMAGING | Age: 89
End: 2024-07-05
Payer: MEDICARE

## 2024-07-05 LAB
GLUCOSE BLD-MCNC: 120 MG/DL (ref 74–99)
GLUCOSE BLD-MCNC: 156 MG/DL (ref 74–99)
GLUCOSE BLD-MCNC: 238 MG/DL (ref 74–99)
GLUCOSE BLD-MCNC: 91 MG/DL (ref 74–99)

## 2024-07-05 PROCEDURE — G0378 HOSPITAL OBSERVATION PER HR: HCPCS

## 2024-07-05 PROCEDURE — 6370000000 HC RX 637 (ALT 250 FOR IP): Performed by: INTERNAL MEDICINE

## 2024-07-05 PROCEDURE — 97535 SELF CARE MNGMENT TRAINING: CPT

## 2024-07-05 PROCEDURE — 6360000002 HC RX W HCPCS: Performed by: INTERNAL MEDICINE

## 2024-07-05 PROCEDURE — 82962 GLUCOSE BLOOD TEST: CPT

## 2024-07-05 PROCEDURE — 99232 SBSQ HOSP IP/OBS MODERATE 35: CPT | Performed by: INTERNAL MEDICINE

## 2024-07-05 PROCEDURE — 97530 THERAPEUTIC ACTIVITIES: CPT

## 2024-07-05 PROCEDURE — 96372 THER/PROPH/DIAG INJ SC/IM: CPT

## 2024-07-05 PROCEDURE — 70450 CT HEAD/BRAIN W/O DYE: CPT

## 2024-07-05 PROCEDURE — 97110 THERAPEUTIC EXERCISES: CPT

## 2024-07-05 RX ADMIN — TRIAMCINOLONE ACETONIDE: 1 CREAM TOPICAL at 08:46

## 2024-07-05 RX ADMIN — INSULIN LISPRO 1 UNITS: 100 INJECTION, SOLUTION INTRAVENOUS; SUBCUTANEOUS at 12:42

## 2024-07-05 RX ADMIN — TRIAMCINOLONE ACETONIDE: 1 CREAM TOPICAL at 21:13

## 2024-07-05 RX ADMIN — ENOXAPARIN SODIUM 40 MG: 100 INJECTION SUBCUTANEOUS at 08:48

## 2024-07-05 RX ADMIN — LEVOTHYROXINE SODIUM 25 MCG: 25 TABLET ORAL at 05:39

## 2024-07-05 RX ADMIN — PREDNISONE 20 MG: 20 TABLET ORAL at 08:47

## 2024-07-05 RX ADMIN — LISINOPRIL 10 MG: 10 TABLET ORAL at 08:47

## 2024-07-05 RX ADMIN — LISINOPRIL 10 MG: 10 TABLET ORAL at 21:03

## 2024-07-05 RX ADMIN — ASPIRIN 81 MG: 81 TABLET, COATED ORAL at 08:47

## 2024-07-05 RX ADMIN — DOXYCYCLINE HYCLATE 100 MG: 100 CAPSULE ORAL at 21:03

## 2024-07-05 RX ADMIN — DOXYCYCLINE HYCLATE 100 MG: 100 CAPSULE ORAL at 08:47

## 2024-07-05 RX ADMIN — CITALOPRAM HYDROBROMIDE 20 MG: 20 TABLET ORAL at 08:54

## 2024-07-05 NOTE — PROGRESS NOTES
Stood ambulated into hallway and back into room, cues for pacing, walker approximation and walker control. Assisted back to edge of bed and performed seated exercise.      Therapeutic Exercises:  ankle pumps, hip abduction/adduction, long arc quad, and seated marching x 12 reps.      At end of session, patient in bed with  daughter  call light and phone within reach,  all lines and tubes intact, nursing notified.      Patient would benefit from continued skilled Physical Therapy to improve functional independence and quality of life.         Patient's/ family goals   none stated    Time in  1027a  Time out  1050a    Total Treatment Time 23 minutes  CPT codes:    Therapeutic activities (53824)   15 minutes  1 unit(s)  Therapeutic exercises (88771)   8 minutes  1 unit(s)    Treasure Hopson PTA  lic#[id number]

## 2024-07-05 NOTE — PROGRESS NOTES
seen and examined at bedside, appears alert and awake with no acute distress and is able to answer simple  questions. On direct questioning, patient denied any  resting ongoing chest pain, resting SOB, hemoptysis, productive cough, fever, ongoing palpitation, active abdominal pain, hematemesis, rectal bleeding, spencer, hematuria, any other  and GI complaints, and any new focal neuro deficits      Recurrent falls deconditioned due to muscle wasting: workup was negative for infection trauma workup acute lesions are negative PT and placement  Dementia he answers vaguely.  Overnight some 7/3 to 7/4 he had behavioral disturbance on 7/4 during the day he is orientable redirectable and self aware that he is somewhat confused  Metabolic encephalopathy Hypothyroidism continue Synthroid.  TSH 8.79.    7/4 I called daughter Bella at [phone] who says he is determined to nbe independent and doesn't accept help.  He has not been taken his medications as prescribed. Therefore we will give him his usual prescribed dose.  Dtr says he has been canceling dr appointment ad not taking his medications  Htn: On lisinopril 10 twice daily consider increasing send so far in the morning of 7/3 systolic is in the 150s  No change to outpatient treatment regimen for the existing stable combordities including: Depression hyperlipidemia COPD history of solitary pulmonary nodule macular degeneration neuropathy osteoarthritis peptic ulcer disease bullous pemphigoid  DVT prophylaxis: Lovenox  Full code.  Disposition: placement  7/5 due to behavior disturbance he is now a placement problem.  Daughter is visiting today instructed to tell the other daughter that I examine his arm and he shows no sign of acute fracture and is not needing a left shoulder x-ray.  Furthermore I instructed this daughter to have POA paperwork sent in so we know who is officially making his decisions for him if there is a designated POA anyway.  The daughter who I  spoke to who was not present today did recommend changing CODE STATUS but we will verify POA status first    NOTE: This report was transcribed using voice recognition software. Every effort was made to ensure accuracy; however, inadvertent computerized transcription errors may be present.     Electronically signed by WEN STYLES MD on 7/5/2024 at 10:02 AM

## 2024-07-05 NOTE — PROGRESS NOTES
OCCUPATIONAL THERAPY TREATMENT NOTE    [facility]   [address] [facility]        Date:2024  Patient Name: Dariusz Schreiber  MRN: [id number]  : 1935  Room: [address] [place] [address]       Evaluating OT: Gordon Pacheco OTR/L; #[id number]      Referring Provider and Specific Provider Orders/Date:      24   OT eval and treat  Start:  24,   End:  24,   ONE TIME,   Standing Count:  1 Occurrences,   R         Raoul Elias MD       Placement Recommendation: Subacute Rehab        Diagnosis:   No diagnosis found.     Surgery: None       Pertinent Medical History:       Past Medical History        Past Medical History:   Diagnosis Date    Arthritis      Asthma      COPD (chronic obstructive pulmonary disease) (HCC)      Depression      Hiatal hernia      Hx of solitary pulmonary nodule      Hyperglycemia      Hyperlipidemia      Hypertension      Macular degeneration      Neuropathy      Osteoarthritis      Peptic ulcer      PONV (postoperative nausea and vomiting)      Thyroid disease              Past Surgical History         Past Surgical History:   Procedure Laterality Date    BACK SURGERY             COLONOSCOPY        ENDOSCOPY, COLON, DIAGNOSTIC        EYE SURGERY         viviane cataracts    HERNIA REPAIR         x2  ,     HERNIA REPAIR Right 3/9/2020     LAPAROSCOPIC HERNIA RIGHT  INGUINAL REPAIR WITH MESH ROBOTIC XI performed by Jose M Gr MD at [facility] OR    SINUS SURGERY                Precautions:  Fall Risk, Bedrest with bathroom privileges, Lesions throughout body      Assessment of current deficits:     [x] Functional mobility            [x]ADLs           [x] Strength                   []Cognition    [x] Functional transfers          [x] IADLs          [] Safety Awareness   [x]Endurance    [] Fine Coordination              [x] Balance      [] Vision/perception    []Sensation      []Gross Motor Coordination  [] ROM

## 2024-07-06 LAB
GLUCOSE BLD-MCNC: 117 MG/DL (ref 74–99)
GLUCOSE BLD-MCNC: 140 MG/DL (ref 74–99)
GLUCOSE BLD-MCNC: 163 MG/DL (ref 74–99)
GLUCOSE BLD-MCNC: 71 MG/DL (ref 74–99)

## 2024-07-06 PROCEDURE — 6370000000 HC RX 637 (ALT 250 FOR IP): Performed by: INTERNAL MEDICINE

## 2024-07-06 PROCEDURE — 99232 SBSQ HOSP IP/OBS MODERATE 35: CPT | Performed by: INTERNAL MEDICINE

## 2024-07-06 PROCEDURE — G0378 HOSPITAL OBSERVATION PER HR: HCPCS

## 2024-07-06 PROCEDURE — 6360000002 HC RX W HCPCS: Performed by: INTERNAL MEDICINE

## 2024-07-06 PROCEDURE — 82962 GLUCOSE BLOOD TEST: CPT

## 2024-07-06 PROCEDURE — 96372 THER/PROPH/DIAG INJ SC/IM: CPT

## 2024-07-06 RX ADMIN — DOXYCYCLINE HYCLATE 100 MG: 100 CAPSULE ORAL at 21:29

## 2024-07-06 RX ADMIN — CITALOPRAM HYDROBROMIDE 20 MG: 20 TABLET ORAL at 08:21

## 2024-07-06 RX ADMIN — PREDNISONE 20 MG: 20 TABLET ORAL at 08:21

## 2024-07-06 RX ADMIN — DOXYCYCLINE HYCLATE 100 MG: 100 CAPSULE ORAL at 08:21

## 2024-07-06 RX ADMIN — TRIAMCINOLONE ACETONIDE: 1 CREAM TOPICAL at 08:23

## 2024-07-06 RX ADMIN — LISINOPRIL 10 MG: 10 TABLET ORAL at 08:21

## 2024-07-06 RX ADMIN — ASPIRIN 81 MG: 81 TABLET, COATED ORAL at 08:21

## 2024-07-06 RX ADMIN — TRIAMCINOLONE ACETONIDE: 1 CREAM TOPICAL at 21:45

## 2024-07-06 RX ADMIN — ENOXAPARIN SODIUM 40 MG: 100 INJECTION SUBCUTANEOUS at 08:26

## 2024-07-06 RX ADMIN — LISINOPRIL 10 MG: 10 TABLET ORAL at 21:29

## 2024-07-06 RX ADMIN — LEVOTHYROXINE SODIUM 25 MCG: 25 TABLET ORAL at 06:02

## 2024-07-06 ASSESSMENT — PAIN SCALES - GENERAL: PAINLEVEL_OUTOF10: 0

## 2024-07-06 NOTE — PROGRESS NOTES
Admitting Date and Time: 7/2/2024 10:13 PM  Admit Dx: Generalized weakness [R53.1]    Subjective:  Patient no complaints getting cleaned up  Memory impaired  Per RN: No issues    ROS: denies fever, chills, cp, sob, n/v, HA unless stated above.     sodium chloride flush  10 mL IntraVENous 2 times per day    enoxaparin  40 mg SubCUTAneous Daily    aspirin  81 mg Oral Daily    lisinopril  10 mg Oral BID    levothyroxine  25 mcg Oral Daily    citalopram  20 mg Oral Daily    insulin lispro  0-4 Units SubCUTAneous TID WC    insulin lispro  0-4 Units SubCUTAneous Nightly    doxycycline hyclate  100 mg Oral BID    triamcinolone   Topical BID    predniSONE  20 mg Oral Daily    Followed by    [START ON 7/8/2024] predniSONE  15 mg Oral Daily    Followed by    [START ON 7/15/2024] predniSONE  10 mg Oral Daily    Followed by    [START ON 7/22/2024] predniSONE  5 mg Oral Daily     sodium chloride flush, 10 mL, PRN  sodium chloride, , PRN  promethazine, 12.5 mg, Q6H PRN   Or  ondansetron, 4 mg, Q6H PRN  polyethylene glycol, 17 g, Daily PRN  acetaminophen, 650 mg, Q6H PRN   Or  acetaminophen, 650 mg, Q6H PRN  glucagon (rDNA), 1 mg, PRN  glucose, 4 tablet, PRN  dextrose bolus, 125 mL, PRN   Or  dextrose bolus, 250 mL, PRN  dextrose, , Continuous PRN         Objective:    BP (!) 187/105   Pulse 86   Temp 97.9 °F (36.6 °C) (Oral)   Resp 18   Ht 1.753 m (5' 9\")   Wt 72.6 kg (160 lb)   SpO2 98%   BMI 23.63 kg/m²   General Appearance: alert and oriented to person, place and time and in no acute distress  Skin: warm and dry  Head: normocephalic and atraumatic  Eyes: pupils equal, round, and reactive to light, extraocular eye movements intact, conjunctivae normal  Neck: neck supple and non tender without mass   Pulmonary/Chest: clear to auscultation bilaterally- no wheezes, rales or rhonchi, normal air movement, no respiratory distress  Cardiovascular: normal rate, normal S1 and S2 and no carotid bruits  Abdomen: soft, non-tender,

## 2024-07-07 VITALS
RESPIRATION RATE: 18 BRPM | OXYGEN SATURATION: 99 % | TEMPERATURE: 98.7 F | DIASTOLIC BLOOD PRESSURE: 80 MMHG | WEIGHT: 160 LBS | SYSTOLIC BLOOD PRESSURE: 154 MMHG | HEART RATE: 84 BPM | BODY MASS INDEX: 23.7 KG/M2 | HEIGHT: 69 IN

## 2024-07-07 LAB
GLUCOSE BLD-MCNC: 104 MG/DL (ref 74–99)
GLUCOSE BLD-MCNC: 117 MG/DL (ref 74–99)
GLUCOSE BLD-MCNC: 206 MG/DL (ref 74–99)
GLUCOSE BLD-MCNC: 93 MG/DL (ref 74–99)

## 2024-07-07 PROCEDURE — 6370000000 HC RX 637 (ALT 250 FOR IP): Performed by: INTERNAL MEDICINE

## 2024-07-07 PROCEDURE — 96372 THER/PROPH/DIAG INJ SC/IM: CPT

## 2024-07-07 PROCEDURE — 6360000002 HC RX W HCPCS: Performed by: INTERNAL MEDICINE

## 2024-07-07 PROCEDURE — 99232 SBSQ HOSP IP/OBS MODERATE 35: CPT | Performed by: INTERNAL MEDICINE

## 2024-07-07 PROCEDURE — 97530 THERAPEUTIC ACTIVITIES: CPT

## 2024-07-07 PROCEDURE — G0378 HOSPITAL OBSERVATION PER HR: HCPCS

## 2024-07-07 PROCEDURE — 82962 GLUCOSE BLOOD TEST: CPT

## 2024-07-07 RX ADMIN — BENZOCAINE 6 MG-MENTHOL 10 MG LOZENGES 1 LOZENGE: at 16:47

## 2024-07-07 RX ADMIN — ENOXAPARIN SODIUM 40 MG: 100 INJECTION SUBCUTANEOUS at 09:25

## 2024-07-07 RX ADMIN — DOXYCYCLINE HYCLATE 100 MG: 100 CAPSULE ORAL at 09:25

## 2024-07-07 RX ADMIN — DOXYCYCLINE HYCLATE 100 MG: 100 CAPSULE ORAL at 19:52

## 2024-07-07 RX ADMIN — LISINOPRIL 10 MG: 10 TABLET ORAL at 19:51

## 2024-07-07 RX ADMIN — PREDNISONE 20 MG: 20 TABLET ORAL at 09:25

## 2024-07-07 RX ADMIN — LEVOTHYROXINE SODIUM 25 MCG: 25 TABLET ORAL at 06:02

## 2024-07-07 RX ADMIN — CITALOPRAM HYDROBROMIDE 20 MG: 20 TABLET ORAL at 09:24

## 2024-07-07 RX ADMIN — TRIAMCINOLONE ACETONIDE: 1 CREAM TOPICAL at 09:32

## 2024-07-07 RX ADMIN — TRIAMCINOLONE ACETONIDE: 1 CREAM TOPICAL at 19:52

## 2024-07-07 RX ADMIN — ASPIRIN 81 MG: 81 TABLET, COATED ORAL at 09:25

## 2024-07-07 RX ADMIN — LISINOPRIL 10 MG: 10 TABLET ORAL at 09:25

## 2024-07-07 ASSESSMENT — PAIN SCALES - GENERAL
PAINLEVEL_OUTOF10: 0
PAINLEVEL_OUTOF10: 0

## 2024-07-07 NOTE — PROGRESS NOTES
plan of care, energy conservation, importance of positional changes for oxygen exchange,  importance of mobility while in hospital , importance and purpose of adaptive device and adjusted to proper height for the patient., safety , and seated exercises      Patient response to education:   Pt verbalized understanding Pt demonstrated skill Pt requires further education in this area   Yes Partial Yes      Treatment:  Patient practiced and was instructed/facilitated in the following treatment: Patient transferred to edge of bed and Sat edge of bed 5 minutes with Supervision  to increase dynamic sitting balance and activity tolerance. Patient stood to amb to bathroom, assisted with hygiene and doffing/donning briefs and pants. Patient amb back to bed for rest break, then stood to amb into hallway. Patient declined sitting up in chair due to fatigue and transferred back to supine position. Education on the importance of exercises while sitting and in bed.       Therapeutic Exercises:  not performed       At end of session, patient in bed with     call light and phone within reach,  all lines and tubes intact, nursing notified.      Patient would benefit from continued skilled Physical Therapy to improve functional independence and quality of life.         Patient's/ family goals   none stated    Time in  1027  Time out  1103    Total Treatment Time 36 minutes  CPT codes:  Therapeutic activities (45697)   26 minutes  2 unit(s)  Non billable time 10 minutes    Zeenat Dia PTA  lic#[id number]

## 2024-07-07 NOTE — PROGRESS NOTES
Admitting Date and Time: 7/2/2024 10:13 PM  Admit Dx: Generalized weakness [R53.1]    Subjective:  Easily wakes from sleep with no complaints.  He greeted me with respect after I reintroduced myself.  Memory impaired  Per RN: No issues    ROS: denies fever, chills, cp, sob, n/v, HA unless stated above.     sodium chloride flush  10 mL IntraVENous 2 times per day    enoxaparin  40 mg SubCUTAneous Daily    aspirin  81 mg Oral Daily    lisinopril  10 mg Oral BID    levothyroxine  25 mcg Oral Daily    citalopram  20 mg Oral Daily    insulin lispro  0-4 Units SubCUTAneous TID WC    insulin lispro  0-4 Units SubCUTAneous Nightly    doxycycline hyclate  100 mg Oral BID    triamcinolone   Topical BID    [START ON 7/8/2024] predniSONE  15 mg Oral Daily    Followed by    [START ON 7/15/2024] predniSONE  10 mg Oral Daily    Followed by    [START ON 7/22/2024] predniSONE  5 mg Oral Daily     sodium chloride flush, 10 mL, PRN  sodium chloride, , PRN  promethazine, 12.5 mg, Q6H PRN   Or  ondansetron, 4 mg, Q6H PRN  polyethylene glycol, 17 g, Daily PRN  acetaminophen, 650 mg, Q6H PRN   Or  acetaminophen, 650 mg, Q6H PRN  glucagon (rDNA), 1 mg, PRN  glucose, 4 tablet, PRN  dextrose bolus, 125 mL, PRN   Or  dextrose bolus, 250 mL, PRN  dextrose, , Continuous PRN         Objective:    BP (!) 164/92   Pulse 84   Temp 97.6 °F (36.4 °C) (Oral)   Resp 20   Ht 1.753 m (5' 9\")   Wt 72.6 kg (160 lb)   SpO2 96%   BMI 23.63 kg/m²   General Appearance: alert and oriented to person, place and time and in no acute distress  Skin: warm and dry  Head: normocephalic and atraumatic  Eyes: pupils equal, round, and reactive to light, extraocular eye movements intact, conjunctivae normal  Neck: neck supple and non tender without mass   Pulmonary/Chest: clear to auscultation bilaterally- no wheezes, rales or rhonchi, normal air movement, no respiratory distress  Cardiovascular: normal rate, normal S1 and S2 and no carotid bruits  Abdomen: soft,

## 2024-07-08 LAB
GLUCOSE BLD-MCNC: 117 MG/DL (ref 74–99)
GLUCOSE BLD-MCNC: 191 MG/DL (ref 74–99)
GLUCOSE BLD-MCNC: 86 MG/DL (ref 74–99)
GLUCOSE BLD-MCNC: 95 MG/DL (ref 74–99)

## 2024-07-08 PROCEDURE — 82962 GLUCOSE BLOOD TEST: CPT

## 2024-07-08 PROCEDURE — G0378 HOSPITAL OBSERVATION PER HR: HCPCS

## 2024-07-08 PROCEDURE — 99232 SBSQ HOSP IP/OBS MODERATE 35: CPT | Performed by: INTERNAL MEDICINE

## 2024-07-08 PROCEDURE — 97535 SELF CARE MNGMENT TRAINING: CPT

## 2024-07-08 PROCEDURE — 97530 THERAPEUTIC ACTIVITIES: CPT

## 2024-07-08 PROCEDURE — 6370000000 HC RX 637 (ALT 250 FOR IP): Performed by: INTERNAL MEDICINE

## 2024-07-08 PROCEDURE — 96372 THER/PROPH/DIAG INJ SC/IM: CPT

## 2024-07-08 PROCEDURE — 6360000002 HC RX W HCPCS: Performed by: INTERNAL MEDICINE

## 2024-07-08 RX ADMIN — DOXYCYCLINE HYCLATE 100 MG: 100 CAPSULE ORAL at 20:05

## 2024-07-08 RX ADMIN — CITALOPRAM HYDROBROMIDE 20 MG: 20 TABLET ORAL at 10:10

## 2024-07-08 RX ADMIN — TRIAMCINOLONE ACETONIDE: 1 CREAM TOPICAL at 20:04

## 2024-07-08 RX ADMIN — ACETAMINOPHEN 650 MG: 325 TABLET ORAL at 20:05

## 2024-07-08 RX ADMIN — LEVOTHYROXINE SODIUM 25 MCG: 25 TABLET ORAL at 05:37

## 2024-07-08 RX ADMIN — LISINOPRIL 10 MG: 10 TABLET ORAL at 10:11

## 2024-07-08 RX ADMIN — ASPIRIN 81 MG: 81 TABLET, COATED ORAL at 10:08

## 2024-07-08 RX ADMIN — ENOXAPARIN SODIUM 40 MG: 100 INJECTION SUBCUTANEOUS at 10:05

## 2024-07-08 RX ADMIN — LISINOPRIL 10 MG: 10 TABLET ORAL at 20:05

## 2024-07-08 RX ADMIN — DOXYCYCLINE HYCLATE 100 MG: 100 CAPSULE ORAL at 10:11

## 2024-07-08 RX ADMIN — PREDNISONE 15 MG: 10 TABLET ORAL at 10:11

## 2024-07-08 ASSESSMENT — PAIN DESCRIPTION - LOCATION: LOCATION: BACK

## 2024-07-08 ASSESSMENT — PAIN SCALES - WONG BAKER: WONGBAKER_NUMERICALRESPONSE: HURTS A LITTLE BIT

## 2024-07-08 ASSESSMENT — PAIN SCALES - GENERAL
PAINLEVEL_OUTOF10: 5
PAINLEVEL_OUTOF10: 0

## 2024-07-08 ASSESSMENT — PAIN DESCRIPTION - DESCRIPTORS: DESCRIPTORS: ACHING

## 2024-07-08 NOTE — PROGRESS NOTES
Patient response to education:   Pt verbalized understanding Pt demonstrated skill Pt requires further education in this area   Yes Partial Yes      Treatment:  Patient practiced and was instructed/facilitated in the following treatment: Patient transferred to edge of bed and Sat edge of bed 8 minutes with Supervision  to increase dynamic sitting balance and activity tolerance. Patient stood  took steps to chair. Performed seated exercise. Stood ambulated into hallway and back into bedside chair. MELGAR present at end of session. Redirection cues needed to stay on task.     Therapeutic Exercises:  ankle pumps, heel raises, long arc quad, and seated marching x 12 reps.        At end of session, patient in chair with  MELGAR  call light and phone within reach,  all lines and tubes intact, nursing notified.      Patient would benefit from continued skilled Physical Therapy to improve functional independence and quality of life.         Patient's/ family goals   none stated    Time in  1004a  Time out  1026    Total Treatment Time 22 minutes  CPT codes:  Therapeutic activities (37680)   12 minutes  1 unit(s)  Therapeutic exercises (26622)   5 minutes  0 unit(s)  Non billable time 5 minutes    Treasure Hopson PTA  lic#[id number]

## 2024-07-08 NOTE — PROGRESS NOTES
clothing?: A Little  How much help is needed for taking care of personal grooming?: A Little  How much help for eating meals?: A Little  AM-PAC Inpatient Daily Activity Raw Score: 15  AM-PAC Inpatient ADL T-Scale Score : 34.69  ADL Inpatient CMS 0-100% Score: 56.46  ADL Inpatient CMS G-Code Modifier : CK                Functional Assessment:     Initial Eval Status  Date: 7/3/24 Treatment Status  Date: 7/8/24 STGs = LTGs  Time frame: 10-14 days   Feeding Supervision  Supervision seated EOB  Modified [place]    Grooming Minimal Assist      Unsteadiness standing at sink completing unilateral tasks due to increase fatigue/weakness; Educated on EC techniques.    Supervision to wash face    MIN A standing at sink to wash face  Modified [place]    UB Dressing Minimal Assist  MIN A to zeinab/[facility] gown  Modified [place]    LB Dressing Moderate Assist  MOD A to zeinab pants/brief  pt requiring assist to thread over B LE, maintain standing balance to pull up around waist v/c's for technique  Supervision    Bathing Moderate Assist MIN A to complete sponge bathing seated in chair  Supervision    Toileting Maximal Assist      Assistance with clothing management and posterior hygiene.  N/t  Minimal Assist    Bed Mobility  Supine to sit: N/T   Sit to supine: Supervision   Rolling: N/T   N/t pt seated in chair upon arrival  Supine to sit: Modified [place]   Sit to supine: Modified [place]   Rolling:Modified [place]     Functional Transfers Moderate Assist from toilet sit to stand.   Transfer training with verbal cues for hand placement throughout session to improve safety.  MIN  A sit<>stand from chair to w/w v/c's for hand placement     MOD A sit<>stand from standard toilet    Supervision    Functional Mobility Minimal Assist with wheeled walker to improve balance, verbal cues for walker sequence and safety.      Functional mobility to and from bathroom.  MIN A with w/w house hold distances

## 2024-07-08 NOTE — PROGRESS NOTES
Admitting Date and Time: 7/2/2024 10:13 PM  Admit Dx: Generalized weakness [R53.1]    Subjective:  7/7 Easily wakes from sleep with no complaints.  He greeted me with respect after I reintroduced myself.  Memory impaired  Per RN: No issues    ROS: denies fever, chills, cp, sob, n/v, HA unless stated above.    7/8 patient is sitting in the chair in no distress, pleasant     sodium chloride flush  10 mL IntraVENous 2 times per day    enoxaparin  40 mg SubCUTAneous Daily    aspirin  81 mg Oral Daily    lisinopril  10 mg Oral BID    levothyroxine  25 mcg Oral Daily    citalopram  20 mg Oral Daily    insulin lispro  0-4 Units SubCUTAneous TID WC    insulin lispro  0-4 Units SubCUTAneous Nightly    doxycycline hyclate  100 mg Oral BID    triamcinolone   Topical BID    predniSONE  15 mg Oral Daily    Followed by    [START ON 7/15/2024] predniSONE  10 mg Oral Daily    Followed by    [START ON 7/22/2024] predniSONE  5 mg Oral Daily     Benzocaine-Menthol, 1 lozenge, Q2H PRN  sodium chloride flush, 10 mL, PRN  sodium chloride, , PRN  promethazine, 12.5 mg, Q6H PRN   Or  ondansetron, 4 mg, Q6H PRN  polyethylene glycol, 17 g, Daily PRN  acetaminophen, 650 mg, Q6H PRN   Or  acetaminophen, 650 mg, Q6H PRN  glucagon (rDNA), 1 mg, PRN  glucose, 4 tablet, PRN  dextrose bolus, 125 mL, PRN   Or  dextrose bolus, 250 mL, PRN  dextrose, , Continuous PRN         Objective:    /82   Pulse 95   Temp 98.1 °F (36.7 °C) (Oral)   Resp 18   Ht 1.753 m (5' 9\")   Wt 72.6 kg (160 lb)   SpO2 97%   BMI 23.63 kg/m²   General Appearance: alert and oriented to person, place and time and in no acute distress  Head: normocephalic and atraumatic  Eyes: pupils equal, round, and reactive to light, extraocular eye movements intact, conjunctivae normal  Pulmonary/Chest: clear to auscultation bilaterally- no wheezes, rales or rhonchi,   Cardiovascular: normal rate, seems irregular with some extra beats will place him on a monitor   Abdomen: soft,

## 2024-07-09 ENCOUNTER — TELEPHONE (OUTPATIENT)
Dept: FAMILY MEDICINE CLINIC | Age: 89
End: 2024-07-09

## 2024-07-09 LAB
ALBUMIN SERPL-MCNC: 2.9 G/DL (ref 3.5–5.2)
ALP SERPL-CCNC: 84 U/L (ref 40–129)
ALT SERPL-CCNC: 20 U/L (ref 0–40)
ANION GAP SERPL CALCULATED.3IONS-SCNC: 7 MMOL/L (ref 7–16)
AST SERPL-CCNC: 20 U/L (ref 0–39)
BASOPHILS # BLD: 0.04 K/UL (ref 0–0.2)
BASOPHILS NFR BLD: 1 % (ref 0–2)
BILIRUB SERPL-MCNC: 0.3 MG/DL (ref 0–1.2)
BUN SERPL-MCNC: 32 MG/DL (ref 6–23)
CALCIUM SERPL-MCNC: 8.6 MG/DL (ref 8.6–10.2)
CHLORIDE SERPL-SCNC: 104 MMOL/L (ref 98–107)
CO2 SERPL-SCNC: 24 MMOL/L (ref 22–29)
CREAT SERPL-MCNC: 1 MG/DL (ref 0.7–1.2)
EOSINOPHIL # BLD: 0.1 K/UL (ref 0.05–0.5)
EOSINOPHILS RELATIVE PERCENT: 1 % (ref 0–6)
ERYTHROCYTE [DISTWIDTH] IN BLOOD BY AUTOMATED COUNT: 13.4 % (ref 11.5–15)
GFR, ESTIMATED: 71 ML/MIN/1.73M2
GLUCOSE BLD-MCNC: 105 MG/DL (ref 74–99)
GLUCOSE BLD-MCNC: 123 MG/DL (ref 74–99)
GLUCOSE BLD-MCNC: 91 MG/DL (ref 74–99)
GLUCOSE BLD-MCNC: 98 MG/DL (ref 74–99)
GLUCOSE SERPL-MCNC: 147 MG/DL (ref 74–99)
HCT VFR BLD AUTO: 38.9 % (ref 37–54)
HGB BLD-MCNC: 12.3 G/DL (ref 12.5–16.5)
IMM GRANULOCYTES # BLD AUTO: 0.06 K/UL (ref 0–0.58)
IMM GRANULOCYTES NFR BLD: 1 % (ref 0–5)
LYMPHOCYTES NFR BLD: 1.18 K/UL (ref 1.5–4)
LYMPHOCYTES RELATIVE PERCENT: 14 % (ref 20–42)
MCH RBC QN AUTO: 32.5 PG (ref 26–35)
MCHC RBC AUTO-ENTMCNC: 31.6 G/DL (ref 32–34.5)
MCV RBC AUTO: 102.6 FL (ref 80–99.9)
MONOCYTES NFR BLD: 0.74 K/UL (ref 0.1–0.95)
MONOCYTES NFR BLD: 9 % (ref 2–12)
NEUTROPHILS NFR BLD: 75 % (ref 43–80)
NEUTS SEG NFR BLD: 6.28 K/UL (ref 1.8–7.3)
PLATELET # BLD AUTO: 395 K/UL (ref 130–450)
PMV BLD AUTO: 9.7 FL (ref 7–12)
POTASSIUM SERPL-SCNC: 4.7 MMOL/L (ref 3.5–5)
PROT SERPL-MCNC: 6.3 G/DL (ref 6.4–8.3)
RBC # BLD AUTO: 3.79 M/UL (ref 3.8–5.8)
SODIUM SERPL-SCNC: 135 MMOL/L (ref 132–146)
WBC OTHER # BLD: 8.4 K/UL (ref 4.5–11.5)

## 2024-07-09 PROCEDURE — 99232 SBSQ HOSP IP/OBS MODERATE 35: CPT | Performed by: INTERNAL MEDICINE

## 2024-07-09 PROCEDURE — 99233 SBSQ HOSP IP/OBS HIGH 50: CPT | Performed by: INTERNAL MEDICINE

## 2024-07-09 PROCEDURE — 6370000000 HC RX 637 (ALT 250 FOR IP): Performed by: INTERNAL MEDICINE

## 2024-07-09 PROCEDURE — 96372 THER/PROPH/DIAG INJ SC/IM: CPT

## 2024-07-09 PROCEDURE — 80053 COMPREHEN METABOLIC PANEL: CPT

## 2024-07-09 PROCEDURE — 36415 COLL VENOUS BLD VENIPUNCTURE: CPT

## 2024-07-09 PROCEDURE — G0378 HOSPITAL OBSERVATION PER HR: HCPCS

## 2024-07-09 PROCEDURE — 82962 GLUCOSE BLOOD TEST: CPT

## 2024-07-09 PROCEDURE — 6360000002 HC RX W HCPCS: Performed by: INTERNAL MEDICINE

## 2024-07-09 PROCEDURE — 85025 COMPLETE CBC W/AUTO DIFF WBC: CPT

## 2024-07-09 RX ADMIN — TRIAMCINOLONE ACETONIDE: 1 CREAM TOPICAL at 10:03

## 2024-07-09 RX ADMIN — LISINOPRIL 10 MG: 10 TABLET ORAL at 22:35

## 2024-07-09 RX ADMIN — ASPIRIN 81 MG: 81 TABLET, COATED ORAL at 09:51

## 2024-07-09 RX ADMIN — CITALOPRAM HYDROBROMIDE 20 MG: 20 TABLET ORAL at 10:03

## 2024-07-09 RX ADMIN — TRIAMCINOLONE ACETONIDE: 1 CREAM TOPICAL at 22:37

## 2024-07-09 RX ADMIN — LISINOPRIL 10 MG: 10 TABLET ORAL at 09:52

## 2024-07-09 RX ADMIN — LEVOTHYROXINE SODIUM 25 MCG: 25 TABLET ORAL at 05:55

## 2024-07-09 RX ADMIN — ENOXAPARIN SODIUM 40 MG: 100 INJECTION SUBCUTANEOUS at 09:52

## 2024-07-09 RX ADMIN — DOXYCYCLINE HYCLATE 100 MG: 100 CAPSULE ORAL at 09:52

## 2024-07-09 RX ADMIN — DOXYCYCLINE HYCLATE 100 MG: 100 CAPSULE ORAL at 22:35

## 2024-07-09 RX ADMIN — PREDNISONE 15 MG: 10 TABLET ORAL at 09:52

## 2024-07-09 ASSESSMENT — PAIN SCALES - GENERAL: PAINLEVEL_OUTOF10: 0

## 2024-07-09 NOTE — FLOWSHEET NOTE
Inpatient Wound Care    Admit Date: 7/2/2024 10:13 PM    Reason for consult:  mult open areas    Significant history:    Past Medical History:   Diagnosis Date    Arthritis     Asthma     COPD (chronic obstructive pulmonary disease) (HCC)     Depression     Hiatal hernia     Hx of solitary pulmonary nodule     Hyperglycemia     Hyperlipidemia     Hypertension     Macular degeneration     Neuropathy     Osteoarthritis     Peptic ulcer     PONV (postoperative nausea and vomiting)     Thyroid disease        Wound history:      Findings:     07/09/24 1350   Wound 07/09/24 Arm Proximal;Right   Date First Assessed/Time First Assessed: 07/09/24 1350   Primary Wound Type: Other (comment)  Location: Arm  Wound Location Orientation: Proximal;Right   Wound Image    Wound Etiology   (blistering disorder)   Wound Cleansed Irrigated with saline   Dressing/Treatment Other (comment)  (rx OINTMENT)   Wound Assessment Pink/red   Drainage Amount Small (< 25%)   Drainage Description Serous   Odor None   Wound 07/09/24 Knee Right;Anterior   Date First Assessed/Time First Assessed: 07/09/24 1350   Primary Wound Type: (c) Other (comment)  Location: Knee  Wound Location Orientation: Right;Anterior   Wound Image    Wound Etiology Other   Wound Cleansed Cleansed with saline   Dressing/Treatment Non adherent  (rx ointment)   Wound Length (cm) 6 cm   Wound Width (cm) 3 cm   Wound Surface Area (cm^2) 18 cm^2   Wound Assessment Pink/red   Drainage Amount Small (< 25%)   Drainage Description Serosanguinous   Odor None   Cecilia-wound Assessment Other (Comment)  (red)   Wound 07/09/24 Pretibial Left   Date First Assessed/Time First Assessed: 07/09/24 1350   Location: Pretibial  Wound Location Orientation: Left   Wound Image    Wound Cleansed Cleansed with saline   Dressing/Treatment Non adherent  (RXointment)   Wound Assessment Pink/red   Drainage Amount Small (< 25%)   Drainage Description Serosanguinous   Odor None   Cecilia-wound Assessment Other

## 2024-07-09 NOTE — PROGRESS NOTES
non-tender, non-distended, normal bowel sounds, no masses or organomegaly  Extremities: no cyanosis, no clubbing and no  edema  Neurologic: no cranial nerve deficit and speech normal      Recent Labs     07/09/24  0838      K 4.7      CO2 24   BUN 32*   CREATININE 1.0   GLUCOSE 147*   CALCIUM 8.6         Recent Labs     07/09/24  0838   ALKPHOS 84   BILITOT 0.3   AST 20   ALT 20         Recent Labs     07/09/24  0838   WBC 8.4   RBC 3.79*   HGB 12.3*   HCT 38.9   .6*   MCH 32.5   MCHC 31.6*   RDW 13.4      MPV 9.7             Radiology:   CT HEAD WO CONTRAST   Final Result   No acute intracranial abnormality.         XR CHEST PORTABLE   Final Result   1. Bibasilar atelectasis with probable trace left pleural effusion.   2. Hiatal hernia.         CT Head W/O Contrast   Final Result   1. No acute intracranial abnormality.   2. Marked cerebral atrophy with ventricular enlargement.   3. 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   4. Chronic microvascular ischemic changes.         CT CSpine W/O Contrast   Final Result   No acute abnormality of the cervical spine.             Assessment:  Principal Problem:    Generalized weakness  Resolved Problems:    * No resolved hospital problems. *      Plan: History of present illness from History and Physical:  This is a 89 y.o. male  has a past medical history of Arthritis, Asthma, COPD (chronic obstructive pulmonary disease) (HCC), Depression, Hiatal hernia, Hx of solitary pulmonary nodule, Hyperglycemia, Hyperlipidemia, Hypertension, Macular degeneration, Neuropathy, Osteoarthritis, Peptic ulcer, PONV (postoperative nausea and vomiting), and Thyroid disease. presented with Recurrent falls, ADL issues at home  for last few days prior to arrival to the hospital.  He fell 3 times in last one week. No LOC or head injury reported. Feels weak and tired.  The patient was seen and examined at bedside, appears alert and awake with no acute distress and

## 2024-07-09 NOTE — TELEPHONE ENCOUNTER
Called pt's dgt who's asked to reschedule pt's 7/10/24 appt because pt is in hospital.  Appt rescheduled to 10/30/24 and added pt to cancellation list.  Also advised her to call the 800 number to see if another [facility] office can see pt sooner.    ----- Message from Juan M Lei sent at 7/9/2024  3:45 PM EDT -----  Regarding: ECC Appointment Request  ECC Appointment Request    Patient needs appointment for ECC Appointment Type: New to Provider.    Patient Requested Dates(s):July 31 or first week of August     Patient Requested Time:Afternoon     Provider Name: Kaleb Andrade DO    Reason for Appointment Request: New Patient - Available appointments did not meet patient need  --------------------------------------------------------------------------------------------------------------------------    Relationship to Patient: Miguel Brunson Daughter     Call Back Information: OK to leave message on voicemail  Preferred Call Back Number: Phone [phone]    The caller ask to reschedule her father's appointment because her father will go to the nursing home.

## 2024-07-10 VITALS
SYSTOLIC BLOOD PRESSURE: 145 MMHG | HEIGHT: 69 IN | BODY MASS INDEX: 20.86 KG/M2 | OXYGEN SATURATION: 95 % | RESPIRATION RATE: 18 BRPM | TEMPERATURE: 98.2 F | HEART RATE: 92 BPM | WEIGHT: 140.8 LBS | DIASTOLIC BLOOD PRESSURE: 84 MMHG

## 2024-07-10 LAB
GLUCOSE BLD-MCNC: 149 MG/DL (ref 74–99)
GLUCOSE BLD-MCNC: 86 MG/DL (ref 74–99)

## 2024-07-10 PROCEDURE — 6370000000 HC RX 637 (ALT 250 FOR IP): Performed by: INTERNAL MEDICINE

## 2024-07-10 PROCEDURE — 99239 HOSP IP/OBS DSCHRG MGMT >30: CPT | Performed by: INTERNAL MEDICINE

## 2024-07-10 PROCEDURE — 82962 GLUCOSE BLOOD TEST: CPT

## 2024-07-10 PROCEDURE — G0378 HOSPITAL OBSERVATION PER HR: HCPCS

## 2024-07-10 PROCEDURE — 6360000002 HC RX W HCPCS: Performed by: INTERNAL MEDICINE

## 2024-07-10 RX ORDER — PREDNISONE 5 MG/1
15 TABLET ORAL DAILY
Qty: 12 TABLET | Refills: 0 | Status: SHIPPED | OUTPATIENT
Start: 2024-07-11 | End: 2024-07-15

## 2024-07-10 RX ORDER — POLYETHYLENE GLYCOL 3350 17 G/17G
17 POWDER, FOR SOLUTION ORAL DAILY PRN
Qty: 527 G | Refills: 0 | Status: SHIPPED | OUTPATIENT
Start: 2024-07-10 | End: 2024-08-09

## 2024-07-10 RX ORDER — ONDANSETRON 4 MG/1
4 TABLET, FILM COATED ORAL 3 TIMES DAILY PRN
Qty: 15 TABLET | Refills: 0 | Status: SHIPPED | OUTPATIENT
Start: 2024-07-10

## 2024-07-10 RX ORDER — PREDNISONE 5 MG/1
5 TABLET ORAL DAILY
Qty: 7 TABLET | Refills: 0 | Status: SHIPPED | OUTPATIENT
Start: 2024-07-22 | End: 2024-07-29

## 2024-07-10 RX ORDER — PREDNISONE 10 MG/1
10 TABLET ORAL DAILY
Qty: 7 TABLET | Refills: 0 | Status: SHIPPED | OUTPATIENT
Start: 2024-07-15 | End: 2024-07-22

## 2024-07-10 RX ADMIN — LISINOPRIL 10 MG: 10 TABLET ORAL at 09:30

## 2024-07-10 RX ADMIN — ASPIRIN 81 MG: 81 TABLET, COATED ORAL at 09:30

## 2024-07-10 RX ADMIN — TRIAMCINOLONE ACETONIDE: 1 CREAM TOPICAL at 09:31

## 2024-07-10 RX ADMIN — CITALOPRAM HYDROBROMIDE 20 MG: 20 TABLET ORAL at 09:30

## 2024-07-10 RX ADMIN — DOXYCYCLINE HYCLATE 100 MG: 100 CAPSULE ORAL at 09:30

## 2024-07-10 RX ADMIN — PREDNISONE 15 MG: 10 TABLET ORAL at 09:30

## 2024-07-10 RX ADMIN — ENOXAPARIN SODIUM 40 MG: 100 INJECTION SUBCUTANEOUS at 09:30

## 2024-07-10 RX ADMIN — LEVOTHYROXINE SODIUM 25 MCG: 25 TABLET ORAL at 06:31

## 2024-07-10 NOTE — FLOWSHEET NOTE
Pt is steadily improving   Continue with ortho and home PT  RTO 1 month Report called to Shaji at [facility]. Patient transferred by family with transfer paperwork.

## 2024-07-10 NOTE — PLAN OF CARE
Problem: ABCDS Injury Assessment  Goal: Absence of physical injury  7/6/2024 2033 by Cassandra Upton RN  Outcome: Progressing  7/6/2024 1515 by Teodora Parra RN  Outcome: Progressing     Problem: Pain  Goal: Verbalizes/displays adequate comfort level or baseline comfort level  7/6/2024 2033 by Cassandra Upton RN  Outcome: Progressing  7/6/2024 1515 by Teodora Parra RN  Outcome: Progressing     Problem: Safety - Adult  Goal: Free from fall injury  7/6/2024 2033 by Cassandra Upton RN  Outcome: Progressing  7/6/2024 1515 by Teodora Parra RN  Outcome: Progressing     Problem: Confusion  Goal: Confusion, delirium, dementia, or psychosis is improved or at baseline  Description: INTERVENTIONS:  1. Assess for possible contributors to thought disturbance, including medications, impaired vision or hearing, underlying metabolic abnormalities, dehydration, psychiatric diagnoses, and notify attending LIP  2. [place] high risk fall precautions, as indicated  3. Provide frequent short contacts to provide reality reorientation, refocusing and direction  4. Decrease environmental stimuli, including noise as appropriate  5. Monitor and intervene to maintain adequate nutrition, hydration, elimination, sleep and activity  6. If unable to ensure safety without constant attention obtain sitter and review sitter guidelines with assigned personnel  7. Initiate Psychosocial CNS and Spiritual Care consult, as indicated  7/6/2024 2033 by Cassandra Upton RN  Outcome: Progressing  7/6/2024 1515 by Teodora Parra RN  Outcome: Progressing     
  Problem: ABCDS Injury Assessment  Goal: Absence of physical injury  7/5/2024 2136 by Hollie Fuentes RN  Outcome: Progressing     Problem: Pain  Goal: Verbalizes/displays adequate comfort level or baseline comfort level  7/5/2024 2136 by Hollie Fuentes RN  Outcome: Progressing     Problem: Safety - Adult  Goal: Free from fall injury  7/5/2024 2136 by Hollie Fuentes RN  Outcome: Progressing     Problem: Confusion  Goal: Confusion, delirium, dementia, or psychosis is improved or at baseline  Description: INTERVENTIONS:  1. Assess for possible contributors to thought disturbance, including medications, impaired vision or hearing, underlying metabolic abnormalities, dehydration, psychiatric diagnoses, and notify attending LIP  2. [place] high risk fall precautions, as indicated  3. Provide frequent short contacts to provide reality reorientation, refocusing and direction  4. Decrease environmental stimuli, including noise as appropriate  5. Monitor and intervene to maintain adequate nutrition, hydration, elimination, sleep and activity  6. If unable to ensure safety without constant attention obtain sitter and review sitter guidelines with assigned personnel  7. Initiate Psychosocial CNS and Spiritual Care consult, as indicated  Outcome: Progressing     
  Problem: ABCDS Injury Assessment  Goal: Absence of physical injury  7/7/2024 2053 by Belkis Leblanc, RN  Outcome: Progressing  7/7/2024 1113 by Bryanna Alvarado, RN  Outcome: Progressing     
  Problem: ABCDS Injury Assessment  Goal: Absence of physical injury  Outcome: Progressing     Problem: Pain  Goal: Verbalizes/displays adequate comfort level or baseline comfort level  Outcome: Progressing     Problem: Safety - Adult  Goal: Free from fall injury  Outcome: Progressing     
  Problem: ABCDS Injury Assessment  Goal: Absence of physical injury  Outcome: Progressing     Problem: Pain  Goal: Verbalizes/displays adequate comfort level or baseline comfort level  Outcome: Progressing     Problem: Safety - Adult  Goal: Free from fall injury  Outcome: Progressing     Problem: Confusion  Goal: Confusion, delirium, dementia, or psychosis is improved or at baseline  Description: INTERVENTIONS:  1. Assess for possible contributors to thought disturbance, including medications, impaired vision or hearing, underlying metabolic abnormalities, dehydration, psychiatric diagnoses, and notify attending LIP  2. [place] high risk fall precautions, as indicated  3. Provide frequent short contacts to provide reality reorientation, refocusing and direction  4. Decrease environmental stimuli, including noise as appropriate  5. Monitor and intervene to maintain adequate nutrition, hydration, elimination, sleep and activity  6. If unable to ensure safety without constant attention obtain sitter and review sitter guidelines with assigned personnel  7. Initiate Psychosocial CNS and Spiritual Care consult, as indicated  Outcome: Progressing     
  Problem: ABCDS Injury Assessment  Goal: Absence of physical injury  Outcome: Progressing     Problem: Pain  Goal: Verbalizes/displays adequate comfort level or baseline comfort level  Outcome: Progressing     Problem: Safety - Adult  Goal: Free from fall injury  Outcome: Progressing     Problem: Confusion  Goal: Confusion, delirium, dementia, or psychosis is improved or at baseline  Description: INTERVENTIONS:  1. Assess for possible contributors to thought disturbance, including medications, impaired vision or hearing, underlying metabolic abnormalities, dehydration, psychiatric diagnoses, and notify attending LIP  2. [place] high risk fall precautions, as indicated  3. Provide frequent short contacts to provide reality reorientation, refocusing and direction  4. Decrease environmental stimuli, including noise as appropriate  5. Monitor and intervene to maintain adequate nutrition, hydration, elimination, sleep and activity  6. If unable to ensure safety without constant attention obtain sitter and review sitter guidelines with assigned personnel  7. Initiate Psychosocial CNS and Spiritual Care consult, as indicated  Outcome: Progressing     
  Problem: Pain  Goal: Verbalizes/displays adequate comfort level or baseline comfort level  Outcome: Progressing     Problem: Safety - Adult  Goal: Free from fall injury  Outcome: Progressing     
saturation probe site  4.  Every 4-6 hours:  If on nasal continuous positive airway pressure, respiratory therapy assess nares and determine need for appliance change or resting period.  7/10/2024 0312 by Mars Owens RN  Outcome: Progressing

## 2024-07-10 NOTE — CONSULTS
Comprehensive Nutrition Assessment    Type and Reason for Visit:  Initial, Consult, Wound    Nutrition Recommendations/Plan:   Continue current diet   Recommend ONS [place] BID, Ensure Plus HP BID        Malnutrition Assessment:  Malnutrition Status:  Moderate malnutrition (07/10/24 0801)    Context:  Chronic Illness     Findings of the 6 clinical characteristics of malnutrition:  Energy Intake:  Mild decrease in energy intake (Comment)  Weight Loss:  Mild weight loss (specify amount and time period) (6% in 4-5 months)     Body Fat Loss:   (moderate) Triceps, Buccal region, Orbital   Muscle Mass Loss:   (moderate) Temples (temporalis), Clavicles (pectoralis & deltoids), Hand (interosseous)  Fluid Accumulation:  No significant fluid accumulation     Strength:  Not Performed    Nutrition Assessment:    Pt admit w/ generalized weakness & recurrent falls pending placement.  PMHx of COPD, HTN/HLD, neuropathy, OA, PUD, thyroid ds. Pt meets criteria for moderate malnutrition. Pt is on regular diet, intake 25-50%, will add ONS to aid healing & encourage intake.    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 Weight Source: Not Specified  Current BMI (kg/m2): 20.8  Usual

## 2024-07-10 NOTE — CARE COORDINATION
7/8/2024 237p () SS NOTE: SW spoke with Cheryl @ [facility]. Per Cheryl, she has met with pt in person and is able to accept pt. She will start precert on this date. Pasr initiated. SCOOBY will need signed.     Electronically signed by STEPHEN Moreno on 7/8/2024 at 3:53 PM    
ADDENDUM; 7/10/2024. 9:27 AM PRECERT obtained for [facility], PASSR completed. Dr. Rodriguez to discharge pt this afternoon. STEPHANY spoke with serina Blanco whom will transport pt to SNF today at 1:00 pm. Facility liaison and nursing aware.  Need signed SCOOBY. STEPHEN Soares    Ss note:7/10/2024 9:08 AM SW notified by rahat Luna with [facility], PRECERT has been obtained. PASSR completed. Dr. Rodriguez to place discharge for this afternoon. STEPHANY has call out to serina Blanco regarding mode of transportation, awaiting return call. STEPHEN Soares  
Ss note: 7/3/2024 1:42 PM Pt is observation status. Pt has been accepted at [facility] and PRECERT has been submitted. Will await insurance approval. PASSRR completed, will need signed SCOOBY. PTA pt resides home alone, hx of multiple falls. Pts dtr Bella will need notified when pt is discharged. STEPHEN Soares  
Ss note: 7/5/2024 2:34 PM Observation status.POA for Healthcare and Living will in soft chart. Pts son Dariusz is POA, dtr Bella is secondary. Dtr relays she has spoke with her brother and agreeable to referral to [facility], referral made, will await acceptance, (passrr was completed for [facility], will need updated) when receiving facility obtained. Will need signed SCOOBY. No hx of HHC or SNF. STEPHEN Soares  
Ss note: 7/5/2024 3:38 PM Observation status. POA for Healthcare and Living will in soft chart, son Dariusz is POA, dtr Bella is second, sw has been speaking with dtr for dc plan, she has spoken to her brother and referral was placed to [facility]. Pt with Hx of falls, resides home alone. Per liaison Cheryl with [facility], she will do an on site today and liaison will need to see if pt has any behaviors over the weekend before accepting. At this time awaiting liaison assessment and sw/cm will need to follow up for acceptance on monday, requires PRECERT (passrr was done for [facility] that will need updated once accepting snf obtained. ([facility] but no beds and no official acceptance. Will need signed SCOOBY. STEPHEN Soares  
Ss note: 7/5/2024 9:11 AM Rob notified by liaison Luciana that [facility] is unable to accept pt, liaison pulled the PRECERT. SW paged therapy for updated notes and spoke with dtr Bella for additional snf choices. Dtr relays she wants pt to be a DNR CC, sw notified charge nurse to notify physician. Dtr also requesting an x ray of left arm/shoulder, charge nurse notified. Dtr gave SNF preferences of 1. [facility] referral made awaiting response. 2. [facility], referral made but chart not reviewed as no male beds at this time. Will await updated therapy notes and response from [facility]. Pt is observation status and a passrr was [facility] for [facility], this will need updated to new SNF once obtained. Will need PRECERT, and signed SCOOBY for SNF. PTA pt has hx of falls, has son and dtr for support. STEPHEN Soares  
Ss note: 7/5/2024.12:11 PM SW left vm message for dtr Bella to updated her that [facility] has denied, [facility] does not have any beds and has concerns pt may need dementia unit. Rob requested additional SNF choices from Dtr Bella. Pt presents from home alone with hx of falls, Pt is observation status, (passrr was completed for [facility] this will need updated to accepting SNF once obtained.) Therapy paged for updated notes. Will await return call from dtr for SNF choices, will need PRECERT and signed SCOOBY. Dtr had requested an xray of left shoulder/arm, physician documentation reflects no xray needed, dtr requesting code status change to [facility], however awaiting dtr to inform whom is POA for pt and provide paperwork. No hx of HHC or SNF. STEPHEN Soares  
Ss note: 7/9/202410:07 AM Pt is observation status. Will need passr for SNF. Per charting pt was accepted at [facility] yesterday and liaison  Cheryl submitted for PRECERT, will await insurance approval, PTA pt resides home alone, hx of multiple falls.  Will need signed SCOOBY. STEPHEN Soares  
others, and if so, who? Yes (dtr Bella merino)  Plans to Return to Present Housing: No (pt and family requesting short term rehab)  Other Identified Issues/Barriers to RETURNING to current housing:   Potential Assistance needed at discharge: Skilled Nursing Facility (for rehab)            Potential DME:  has walker, has cane.     Patient expects to discharge to: SNF   Plan for transportation at discharge:  TBD    Financial    Payor: [facility] MEDICARE / Plan: [employer] MEDIBLUE ESSENTIAL/PLUS / Product Type: *No Product type* /     Does insurance require precert for SNF: Yes    Potential assistance Purchasing Medications: No  Meds-to-Beds request:        [id number] - [place], [place] - [address] - P [phone] - F [phone]  [address] [facility] [address]  Phone: [phone] Fax: [phone]      Notes:    Ss note:7/3/2024 10:43 AM Pt currently observation status. Consult noted recurrent falls, ADL issue evaluate for rehab placement. SW met with pt, pleasant resides home alone in one story. PTA pt relays he uses a walker mostly, has a cane, can drive. Relays his dtrs help with homemaking chores. No hx of SNF. Therapy has been ordered, pt receptive to SNF, requested sw follow up with serina Blanco. Spoke with dtr via phone, prefers [facility]. Referral made to liaadrian Chowdhury, will await acceptance, requires PRECERT. (Passr vs [facility]) and signed SCOOBY. Dtr did not want to give additional SNF choices at this time. SW will follow. STEPHEN Soares    The Plan for Transition of Care is related to the following treatment goals of Generalized weakness [R53.1]

## 2024-07-10 NOTE — DISCHARGE SUMMARY
dailyHistorical Med           STOP taking these medications       permethrin (ELIMITE) 5 % cream Comments:   Reason for Stopping:         hydrOXYzine HCl (ATARAX) 25 MG tablet Comments:   Reason for Stopping:         dupilumab (DUPIXENT) 300 MG/2ML SOPN injection Comments:   Reason for Stopping:         diclofenac sodium (VOLTAREN) 1 % GEL Comments:   Reason for Stopping:         prednisoLONE acetate (PRED FORTE) 1 % ophthalmic suspension Comments:   Reason for Stopping:         tamsulosin (FLOMAX) 0.4 MG capsule Comments:   Reason for Stopping:         fluticasone (FLOVENT HFA) 110 MCG/ACT inhaler Comments:   Reason for Stopping:                 Note that more  than 30 minutes was spent in preparing discharge papers, discussing discharge with patient, medication review, etc.    NOTE: This report was transcribed using voice recognition software. Every effort was made to ensure accuracy; however, inadvertent computerized transcription errors may be present.     Signed:  Electronically signed by Guy Rodriguez MD on 7/10/2024 at 7:07 PM

## 2024-07-10 NOTE — PROGRESS NOTES
SPIRITUAL HEALTH SERVICES - GABRIELLE Baltazar Encounter    Name: Dariusz Schreiber                  Referral: Routine Visit    Sacraments  Anointed (Last Rites): No  Apostolic Roosevelt: No  Confession: No  Communion: Yes     Assessment:  Patient receptive to  visit.      Intervention:   provided spiritual support and sacramental ministry for patient.     Outcome:  Patient expressed gratitude for visit.    Plan:  Chaplains will remain available to offer spiritual and emotional support as needed.      Electronically signed by Chaplain Salty, on 7/10/2024 at 1:22 PM.  Spiritual Care Department  [facility]  [phone]

## 2024-07-18 ENCOUNTER — APPOINTMENT (OUTPATIENT)
Dept: GENERAL RADIOLOGY | Age: 89
DRG: 481 | End: 2024-07-18
Payer: MEDICARE

## 2024-07-18 ENCOUNTER — APPOINTMENT (OUTPATIENT)
Dept: CT IMAGING | Age: 89
DRG: 481 | End: 2024-07-18
Payer: MEDICARE

## 2024-07-18 ENCOUNTER — HOSPITAL ENCOUNTER (INPATIENT)
Age: 89
LOS: 6 days | Discharge: NURSING, REHAB OR LONG-TERM CARE | DRG: 481 | End: 2024-07-24
Attending: EMERGENCY MEDICINE | Admitting: INTERNAL MEDICINE
Payer: MEDICARE

## 2024-07-18 DIAGNOSIS — S72.001A CLOSED RIGHT HIP FRACTURE, INITIAL ENCOUNTER (HCC): ICD-10-CM

## 2024-07-18 DIAGNOSIS — S72.141A CLOSED 2-PART INTERTROCHANTERIC FRACTURE OF RIGHT FEMUR, INITIAL ENCOUNTER (HCC): Primary | ICD-10-CM

## 2024-07-18 DIAGNOSIS — I42.9 CARDIOMYOPATHY, UNSPECIFIED TYPE (HCC): ICD-10-CM

## 2024-07-18 LAB
ANION GAP SERPL CALCULATED.3IONS-SCNC: 7 MMOL/L (ref 7–16)
BASOPHILS # BLD: 0.03 K/UL (ref 0–0.2)
BASOPHILS NFR BLD: 0 % (ref 0–2)
BUN SERPL-MCNC: 30 MG/DL (ref 6–23)
CALCIUM SERPL-MCNC: 8.4 MG/DL (ref 8.6–10.2)
CHLORIDE SERPL-SCNC: 101 MMOL/L (ref 98–107)
CO2 SERPL-SCNC: 28 MMOL/L (ref 22–29)
CREAT SERPL-MCNC: 1.1 MG/DL (ref 0.7–1.2)
D-DIMER QUANTITATIVE: 4307 NG/ML DDU (ref 0–230)
EOSINOPHIL # BLD: 0.53 K/UL (ref 0.05–0.5)
EOSINOPHILS RELATIVE PERCENT: 5 % (ref 0–6)
ERYTHROCYTE [DISTWIDTH] IN BLOOD BY AUTOMATED COUNT: 13.7 % (ref 11.5–15)
GFR, ESTIMATED: 63 ML/MIN/1.73M2
GLUCOSE SERPL-MCNC: 136 MG/DL (ref 74–99)
HCT VFR BLD AUTO: 32.1 % (ref 37–54)
HGB BLD-MCNC: 10.3 G/DL (ref 12.5–16.5)
IMM GRANULOCYTES # BLD AUTO: 0.07 K/UL (ref 0–0.58)
IMM GRANULOCYTES NFR BLD: 1 % (ref 0–5)
INR PPP: 1.2
LYMPHOCYTES NFR BLD: 1.86 K/UL (ref 1.5–4)
LYMPHOCYTES RELATIVE PERCENT: 16 % (ref 20–42)
MAGNESIUM SERPL-MCNC: 2.1 MG/DL (ref 1.6–2.6)
MCH RBC QN AUTO: 32 PG (ref 26–35)
MCHC RBC AUTO-ENTMCNC: 32.1 G/DL (ref 32–34.5)
MCV RBC AUTO: 99.7 FL (ref 80–99.9)
MONOCYTES NFR BLD: 0.83 K/UL (ref 0.1–0.95)
MONOCYTES NFR BLD: 7 % (ref 2–12)
NEUTROPHILS NFR BLD: 72 % (ref 43–80)
NEUTS SEG NFR BLD: 8.51 K/UL (ref 1.8–7.3)
PARTIAL THROMBOPLASTIN TIME: 36.1 SEC (ref 24.5–35.1)
PLATELET, FLUORESCENCE: 214 K/UL (ref 130–450)
PMV BLD AUTO: 10.4 FL (ref 7–12)
POTASSIUM SERPL-SCNC: 4.8 MMOL/L (ref 3.5–5)
PROTHROMBIN TIME: 12.7 SEC (ref 9.3–12.4)
RBC # BLD AUTO: 3.22 M/UL (ref 3.8–5.8)
SODIUM SERPL-SCNC: 136 MMOL/L (ref 132–146)
WBC OTHER # BLD: 11.8 K/UL (ref 4.5–11.5)

## 2024-07-18 PROCEDURE — 71045 X-RAY EXAM CHEST 1 VIEW: CPT

## 2024-07-18 PROCEDURE — 2580000003 HC RX 258: Performed by: INTERNAL MEDICINE

## 2024-07-18 PROCEDURE — 6360000002 HC RX W HCPCS: Performed by: SPECIALIST/TECHNOLOGIST

## 2024-07-18 PROCEDURE — 2580000003 HC RX 258: Performed by: SPECIALIST/TECHNOLOGIST

## 2024-07-18 PROCEDURE — 86923 COMPATIBILITY TEST ELECTRIC: CPT

## 2024-07-18 PROCEDURE — 80048 BASIC METABOLIC PNL TOTAL CA: CPT

## 2024-07-18 PROCEDURE — 71275 CT ANGIOGRAPHY CHEST: CPT

## 2024-07-18 PROCEDURE — 86901 BLOOD TYPING SEROLOGIC RH(D): CPT

## 2024-07-18 PROCEDURE — 85025 COMPLETE CBC W/AUTO DIFF WBC: CPT

## 2024-07-18 PROCEDURE — 36415 COLL VENOUS BLD VENIPUNCTURE: CPT

## 2024-07-18 PROCEDURE — 86850 RBC ANTIBODY SCREEN: CPT

## 2024-07-18 PROCEDURE — 73502 X-RAY EXAM HIP UNI 2-3 VIEWS: CPT

## 2024-07-18 PROCEDURE — 1200000000 HC SEMI PRIVATE

## 2024-07-18 PROCEDURE — 6360000004 HC RX CONTRAST MEDICATION: Performed by: RADIOLOGY

## 2024-07-18 PROCEDURE — 83735 ASSAY OF MAGNESIUM: CPT

## 2024-07-18 PROCEDURE — 6360000002 HC RX W HCPCS: Performed by: INTERNAL MEDICINE

## 2024-07-18 PROCEDURE — 99223 1ST HOSP IP/OBS HIGH 75: CPT | Performed by: INTERNAL MEDICINE

## 2024-07-18 PROCEDURE — 73552 X-RAY EXAM OF FEMUR 2/>: CPT

## 2024-07-18 PROCEDURE — 85610 PROTHROMBIN TIME: CPT

## 2024-07-18 PROCEDURE — 99285 EMERGENCY DEPT VISIT HI MDM: CPT

## 2024-07-18 PROCEDURE — 85730 THROMBOPLASTIN TIME PARTIAL: CPT

## 2024-07-18 PROCEDURE — 85379 FIBRIN DEGRADATION QUANT: CPT

## 2024-07-18 PROCEDURE — 87070 CULTURE OTHR SPECIMN AEROBIC: CPT

## 2024-07-18 PROCEDURE — 86900 BLOOD TYPING SEROLOGIC ABO: CPT

## 2024-07-18 PROCEDURE — 87205 SMEAR GRAM STAIN: CPT

## 2024-07-18 RX ORDER — MAGNESIUM SULFATE IN WATER 40 MG/ML
2000 INJECTION, SOLUTION INTRAVENOUS PRN
Status: DISCONTINUED | OUTPATIENT
Start: 2024-07-18 | End: 2024-07-24 | Stop reason: HOSPADM

## 2024-07-18 RX ORDER — SODIUM CHLORIDE 9 MG/ML
INJECTION, SOLUTION INTRAVENOUS ONCE
Status: COMPLETED | OUTPATIENT
Start: 2024-07-18 | End: 2024-07-18

## 2024-07-18 RX ORDER — POLYETHYLENE GLYCOL 3350 17 G/17G
17 POWDER, FOR SOLUTION ORAL DAILY PRN
Status: DISCONTINUED | OUTPATIENT
Start: 2024-07-18 | End: 2024-07-24 | Stop reason: HOSPADM

## 2024-07-18 RX ORDER — ASPIRIN 81 MG/1
81 TABLET ORAL DAILY
Status: DISCONTINUED | OUTPATIENT
Start: 2024-07-19 | End: 2024-07-19 | Stop reason: SDUPTHER

## 2024-07-18 RX ORDER — ONDANSETRON 2 MG/ML
4 INJECTION INTRAMUSCULAR; INTRAVENOUS EVERY 6 HOURS PRN
Status: DISCONTINUED | OUTPATIENT
Start: 2024-07-18 | End: 2024-07-24 | Stop reason: HOSPADM

## 2024-07-18 RX ORDER — LEVOTHYROXINE SODIUM 0.03 MG/1
25 TABLET ORAL DAILY
Status: DISCONTINUED | OUTPATIENT
Start: 2024-07-19 | End: 2024-07-24 | Stop reason: HOSPADM

## 2024-07-18 RX ORDER — TIMOLOL MALEATE 5 MG/ML
1 SOLUTION/ DROPS OPHTHALMIC 2 TIMES DAILY
Status: DISCONTINUED | OUTPATIENT
Start: 2024-07-18 | End: 2024-07-24 | Stop reason: HOSPADM

## 2024-07-18 RX ORDER — POLYETHYLENE GLYCOL 3350 17 G/17G
17 POWDER, FOR SOLUTION ORAL DAILY PRN
Status: DISCONTINUED | OUTPATIENT
Start: 2024-07-18 | End: 2024-07-18

## 2024-07-18 RX ORDER — POTASSIUM CHLORIDE 20 MEQ/1
40 TABLET, EXTENDED RELEASE ORAL PRN
Status: DISCONTINUED | OUTPATIENT
Start: 2024-07-18 | End: 2024-07-24 | Stop reason: HOSPADM

## 2024-07-18 RX ORDER — MIDODRINE HYDROCHLORIDE 5 MG/1
10 TABLET ORAL
Status: DISCONTINUED | OUTPATIENT
Start: 2024-07-18 | End: 2024-07-21

## 2024-07-18 RX ORDER — ALBUMIN (HUMAN) 12.5 G/50ML
50 SOLUTION INTRAVENOUS ONCE
Status: COMPLETED | OUTPATIENT
Start: 2024-07-18 | End: 2024-07-19

## 2024-07-18 RX ORDER — PREDNISONE 10 MG/1
10 TABLET ORAL DAILY
Status: COMPLETED | OUTPATIENT
Start: 2024-07-19 | End: 2024-07-21

## 2024-07-18 RX ORDER — ONDANSETRON 4 MG/1
4 TABLET, ORALLY DISINTEGRATING ORAL EVERY 8 HOURS PRN
Status: DISCONTINUED | OUTPATIENT
Start: 2024-07-18 | End: 2024-07-24 | Stop reason: HOSPADM

## 2024-07-18 RX ORDER — POTASSIUM CHLORIDE 7.45 MG/ML
10 INJECTION INTRAVENOUS PRN
Status: DISCONTINUED | OUTPATIENT
Start: 2024-07-18 | End: 2024-07-24 | Stop reason: HOSPADM

## 2024-07-18 RX ORDER — SODIUM CHLORIDE 9 MG/ML
INJECTION, SOLUTION INTRAVENOUS PRN
Status: DISCONTINUED | OUTPATIENT
Start: 2024-07-18 | End: 2024-07-24 | Stop reason: HOSPADM

## 2024-07-18 RX ORDER — PREDNISONE 5 MG/1
5 TABLET ORAL DAILY
Status: DISCONTINUED | OUTPATIENT
Start: 2024-07-22 | End: 2024-07-24 | Stop reason: HOSPADM

## 2024-07-18 RX ORDER — ACETAMINOPHEN 650 MG/1
650 SUPPOSITORY RECTAL EVERY 6 HOURS PRN
Status: DISCONTINUED | OUTPATIENT
Start: 2024-07-18 | End: 2024-07-24 | Stop reason: HOSPADM

## 2024-07-18 RX ORDER — ASCORBIC ACID 500 MG
500 TABLET ORAL DAILY
Status: DISCONTINUED | OUTPATIENT
Start: 2024-07-19 | End: 2024-07-24 | Stop reason: HOSPADM

## 2024-07-18 RX ORDER — CITALOPRAM 20 MG/1
20 TABLET ORAL DAILY
Status: DISCONTINUED | OUTPATIENT
Start: 2024-07-19 | End: 2024-07-24 | Stop reason: HOSPADM

## 2024-07-18 RX ORDER — ENOXAPARIN SODIUM 100 MG/ML
40 INJECTION SUBCUTANEOUS DAILY
Status: DISCONTINUED | OUTPATIENT
Start: 2024-07-18 | End: 2024-07-24 | Stop reason: HOSPADM

## 2024-07-18 RX ORDER — SODIUM CHLORIDE 0.9 % (FLUSH) 0.9 %
5-40 SYRINGE (ML) INJECTION EVERY 12 HOURS SCHEDULED
Status: DISCONTINUED | OUTPATIENT
Start: 2024-07-18 | End: 2024-07-24 | Stop reason: HOSPADM

## 2024-07-18 RX ORDER — SODIUM CHLORIDE 0.9 % (FLUSH) 0.9 %
5-40 SYRINGE (ML) INJECTION PRN
Status: DISCONTINUED | OUTPATIENT
Start: 2024-07-18 | End: 2024-07-24 | Stop reason: HOSPADM

## 2024-07-18 RX ORDER — TRIAMCINOLONE ACETONIDE 1 MG/G
CREAM TOPICAL 2 TIMES DAILY
Status: DISCONTINUED | OUTPATIENT
Start: 2024-07-18 | End: 2024-07-24 | Stop reason: HOSPADM

## 2024-07-18 RX ORDER — ACETAMINOPHEN 325 MG/1
650 TABLET ORAL EVERY 6 HOURS PRN
Status: DISCONTINUED | OUTPATIENT
Start: 2024-07-18 | End: 2024-07-24 | Stop reason: HOSPADM

## 2024-07-18 RX ADMIN — SODIUM CHLORIDE: 9 INJECTION, SOLUTION INTRAVENOUS at 17:50

## 2024-07-18 RX ADMIN — ENOXAPARIN SODIUM 40 MG: 100 INJECTION SUBCUTANEOUS at 17:51

## 2024-07-18 RX ADMIN — CEFAZOLIN 2000 MG: 2 INJECTION, POWDER, FOR SOLUTION INTRAMUSCULAR; INTRAVENOUS at 17:51

## 2024-07-18 RX ADMIN — IOPAMIDOL 75 ML: 755 INJECTION, SOLUTION INTRAVENOUS at 21:43

## 2024-07-18 NOTE — DISCHARGE INSTR - COC
Continuity of Care Form    Patient Name: Dariusz Schreiber   :  1935  MRN:  [id number]    Admit date:  2024  Discharge date:  2024    Code Status Order: Prior   Advance Directives:     Admitting Physician:  Raoul Elias MD  PCP: No primary care provider on file.    Discharging Nurse: ***  Discharging Hospital Unit/Room#: RICHARD/RICHARD  Discharging Unit Phone Number: [phone]    Emergency Contact:   Extended Emergency Contact Information  Primary Emergency Contact: Dariusz Schreiber III  Home Phone: [phone]  Mobile Phone: [phone]  Relation: Child  Secondary Emergency Contact: Bella Sosa  Home Phone: [phone]  Relation: Child  Preferred language: English   needed? No    Past Surgical History:  Past Surgical History:   Procedure Laterality Date    BACK SURGERY          COLONOSCOPY      ENDOSCOPY, COLON, DIAGNOSTIC      EYE SURGERY      viviane cataracts    HERNIA REPAIR      x2  , 1973    HERNIA REPAIR Right 3/9/2020    LAPAROSCOPIC HERNIA RIGHT  INGUINAL REPAIR WITH MESH ROBOTIC XI performed by Jose M Gr MD at [facility] OR    SINUS SURGERY         Immunization History:   Immunization History   Administered Date(s) Administered    COVID-19, PFIZER Bivalent, DO NOT Dilute, (age 12y+), IM, 30 mcg/0.3 mL 2022    COVID-19, PFIZER PURPLE top, DILUTE for use, (age 12 y+), 30mcg/0.3mL 2021, 2021, 2021    DT (pediatric) 2020    Influenza Vaccine, unspecified formulation 2015    Influenza Virus Vaccine 09/15/2009, 2013, 10/27/2015, 2015, 2016    Influenza Whole 2015    Influenza, FLUAD, (age 65 y+), Adjuvanted, 0.5mL 2020, 2022    Influenza, FLUCELVAX, (age 6 mo+), MDCK, MDV, 0.5mL 2011, 2017    Influenza, High Dose (Fluzone 65 yrs and older) 09/10/2014, 2016, 2017, 2019    Influenza, Triv, inactivated, subunit, adjuvanted, IM (Fluad 65 yrs and older) 2018    Influenza, Trivalent

## 2024-07-18 NOTE — CARE COORDINATION
Case Management Assessment  Initial Evaluation    Date/Time of Evaluation: 7/18/2024 1:49 PM  Assessment Completed by: STEPHEN Robertson    If patient is discharged prior to next notation, then this note serves as note for discharge by case management.    Patient Name: Dariusz Schreiber                   YOB: 1935  Diagnosis: Closed right hip fracture, initial encounter ([facility]) [S72.001A]                   Date / Time: 7/18/2024 10:52 AM    Patient Admission Status: Inpatient   Readmission Risk (Low < 19, Mod (19-27), High > 27): No data recorded  Current PCP: No primary care provider on file.  PCP verified by CM? Yes    Chart Reviewed: Yes      History Provided by: Patient  Patient Orientation: Alert and Oriented    Patient Cognition: Alert    Hospitalization in the last 30 days (Readmission):  Yes    Readmission Assessment  Number of Days since last admission?: 8-30 days  Previous Disposition: SNF  Who is being Interviewed: Patient  What was the patient's/caregiver's perception as to why they think they needed to return back to the hospital?: Other (Comment) (had a fall at nursing home with FX)  Did you visit your Primary Care Physician after you left the hospital, before you returned this time?: Yes  Did you see a specialist, such as Cardiac, Pulmonary, Orthopedic Physician, etc. after you left the hospital?: No  Who advised the patient to return to the hospital?: Skilled Unit  Does the patient report anything that got in the way of taking their medications?: No  In our efforts to provide the best possible care to you and others like you, can you think of anything that we could have done to help you after you left the hospital the first time, so that you might not have needed to return so soon?: Other (Comment) (nothing)      Advance Directives:      Code Status: Prior   Patient's Primary Decision Maker is: Legal Next of Kin    Primary Decision Maker: Bella Sosa - Child - [phone]

## 2024-07-18 NOTE — ED PROVIDER NOTES
------------------------------------------  Re-evaluation(s):  Time: 1245.  Patient’s symptoms show no change  Repeat physical examination is not changed      I have spoken with the patient and family  and discussed today’s results, in addition to providing specific details for the plan of care and counseling regarding the diagnosis and prognosis.  Their questions are answered at this time and they are agreeable with the plan.    In discussion with family, they are requesting Dr. Cartagena for orthopedics    --------------------------------- ADDITIONAL PROVIDER NOTES ---------------------------------  Consultations:  This patient's ED course included: a personal history and physicial examination and re-evaluation prior to disposition    This patient has remained hemodynamically stable, remained unchanged, and been closely monitored during their ED course.    Please note that the withdrawal or failure to initiate urgent interventions for this patient would likely result in a life threatening deterioration or permanent disability.        Clinical Impression  1. Closed 2-part intertrochanteric fracture of right femur, initial encounter ([facility])          Disposition  Patient's disposition: Admit to med/surg floor  Patient's condition is stable.       Joshua Menendez,   07/18/24 1809

## 2024-07-18 NOTE — CONSULTS
Department of Orthopedic Surgery  Resident Consult Note    Reason for Consult: Right Hip Pain    HISTORY OF PRESENT ILLNESS:       Patient is a 89 y.o. male who presents with hip pain after a fall.  Patient is demented and an unreliable historian, family in room who assist with history taking.  Apparently patient sustained a fall yesterday with immediate pain and inability to ambulate.  Patient uses walker at baseline.  Patient was brought to [facility] emergency department for further evaluation.  Orthopedic surgery was consulted secondary to right intertrochanteric fracture.  Patient seen and examined, denies distal numbness/ting/paresthesias, denies any other orthopedic complaints.      Past Medical History:        Diagnosis Date    Arthritis     Asthma     COPD (chronic obstructive pulmonary disease) (HCC)     Depression     Hiatal hernia     Hx of solitary pulmonary nodule     Hyperglycemia     Hyperlipidemia     Hypertension     Macular degeneration     Neuropathy     Osteoarthritis     Peptic ulcer     PONV (postoperative nausea and vomiting)     Thyroid disease      Past Surgical History:        Procedure Laterality Date    BACK SURGERY      1989    COLONOSCOPY      ENDOSCOPY, COLON, DIAGNOSTIC      EYE SURGERY      viviane cataracts    HERNIA REPAIR      x2  1955, 1973    HERNIA REPAIR Right 3/9/2020    LAPAROSCOPIC HERNIA RIGHT  INGUINAL REPAIR WITH MESH ROBOTIC XI performed by Jose M Gr MD at [facility] OR    SINUS SURGERY       Current Medications:   No current facility-administered medications for this encounter.  Allergies:  Lipitor [atorvastatin calcium], Pcn [penicillins], and Prednisone    Social History:   TOBACCO:   reports that he has never smoked. He has never used smokeless tobacco.  ETOH:   reports current alcohol use.  DRUGS:   reports no history of drug use.  Family History:       Problem Relation Age of Onset    Cancer Mother         bladder     Lung Cancer Father     High Blood Pressure

## 2024-07-18 NOTE — H&P
[facility]   History and Physical      CHIEF COMPLAINT:  hip pain    History of Present Illness:  89 y.o. male with a history of COPD arthritis depression dementia pulmonary nodule hyperlipidemia hypertension macular degeneration neuropathy osteoarthritis peptic ulcer thyroid disease presents with hip pain.  He is a poor historian due to dementia but in the emergency room he only complained of his hip pain right side after falling on it yesterday.  He was recently discharged on 7/10 by my partner since he had presented at that time after 3 falls within the week at home.  He was weak he had a workup negative for infection or trauma.  He had some dementia and behavioral disturbance which led to him skipping his medicines and doctors appointments per his daughter.    He was transferred from the ER to the [facility] floor at which point it was noted that his arm blood pressure was low 70/40 although repeat leg was 96/42 he had cold fingers of his pulse ox was reading low but unreliably.  He seemed to do better when he was put on high flow oxygen and his shortness of breath improved.  The nurse notes that he produced yellow sputum with coughing.  I arrived in the room shortly after this change of status he was weaned off his oxygen completely with no respiratory distress I did observe very rare cough.  He has memory impaired but not overtly confused or combative.  Daughter and daughter-in-law at bedside help with history son is POA is in route      Discharge Exam:    Informant(s) for H&P: Chart family patient nurse    REVIEW OF SYSTEMS:  no fevers, chills, cp, sob, n/v, ha, vision/hearing changes, wt changes, hot/cold flashes, other open skin lesions, diarrhea, constipation, dysuria/hematuria unless noted in HPI. Complete ROS performed with the patient and is otherwise negative.      PMH:  Past Medical History:   Diagnosis Date    Arthritis     Asthma     COPD (chronic obstructive

## 2024-07-19 ENCOUNTER — APPOINTMENT (OUTPATIENT)
Dept: GENERAL RADIOLOGY | Age: 89
DRG: 481 | End: 2024-07-19
Payer: MEDICARE

## 2024-07-19 ENCOUNTER — ANESTHESIA EVENT (OUTPATIENT)
Dept: OPERATING ROOM | Age: 89
End: 2024-07-19
Payer: MEDICARE

## 2024-07-19 ENCOUNTER — APPOINTMENT (OUTPATIENT)
Age: 89
DRG: 481 | End: 2024-07-19
Attending: INTERNAL MEDICINE
Payer: MEDICARE

## 2024-07-19 ENCOUNTER — ANESTHESIA (OUTPATIENT)
Dept: OPERATING ROOM | Age: 89
End: 2024-07-19
Payer: MEDICARE

## 2024-07-19 PROBLEM — S72.141A CLOSED 2-PART INTERTROCHANTERIC FRACTURE OF PROXIMAL END OF RIGHT FEMUR (HCC): Status: ACTIVE | Noted: 2024-07-19

## 2024-07-19 PROBLEM — E44.0 MODERATE PROTEIN-CALORIE MALNUTRITION (HCC): Chronic | Status: ACTIVE | Noted: 2024-07-19

## 2024-07-19 LAB
BASOPHILS # BLD: 0.03 K/UL (ref 0–0.2)
BASOPHILS NFR BLD: 0 % (ref 0–2)
ECHO AO ASC DIAM: 3.1 CM
ECHO AO SINUS VALSALVA DIAM: 3.1 CM
ECHO AV AREA PEAK VELOCITY: 2.7 CM2
ECHO AV AREA VTI: 3.1 CM2
ECHO AV CUSP MM: 1.5 CM
ECHO AV MEAN GRADIENT: 5 MMHG
ECHO AV MEAN VELOCITY: 1 M/S
ECHO AV PEAK GRADIENT: 9 MMHG
ECHO AV PEAK VELOCITY: 1.5 M/S
ECHO AV VELOCITY RATIO: 0.67
ECHO AV VTI: 25.9 CM
ECHO EST RA PRESSURE: 8 MMHG
ECHO LA DIAMETER: 3.9 CM
ECHO LA VOL A-L A2C: 97 ML (ref 18–58)
ECHO LA VOL A-L A4C: 108 ML (ref 18–58)
ECHO LA VOL BP: 105 ML (ref 18–58)
ECHO LA VOL MOD A2C: 91 ML (ref 18–58)
ECHO LA VOL MOD A4C: 103 ML (ref 18–58)
ECHO LA VOLUME AREA LENGTH: 112 ML
ECHO LV EDV A4C: 116 ML
ECHO LV EJECTION FRACTION A4C: 55 %
ECHO LV ESV A4C: 53 ML
ECHO LV FRACTIONAL SHORTENING: 26 % (ref 28–44)
ECHO LV INTERNAL DIMENSION DIASTOLIC: 4.2 CM (ref 4.2–5.9)
ECHO LV INTERNAL DIMENSION SYSTOLIC: 3.1 CM
ECHO LV ISOVOLUMETRIC RELAXATION TIME (IVRT): 118 MS
ECHO LV IVSD: 1.3 CM (ref 0.6–1)
ECHO LV MASS 2D: 212.3 G (ref 88–224)
ECHO LV POSTERIOR WALL DIASTOLIC: 1.4 CM (ref 0.6–1)
ECHO LV RELATIVE WALL THICKNESS RATIO: 0.67
ECHO LVOT AREA: 4.5 CM2
ECHO LVOT AV VTI INDEX: 0.71
ECHO LVOT DIAM: 2.4 CM
ECHO LVOT MEAN GRADIENT: 2 MMHG
ECHO LVOT PEAK GRADIENT: 4 MMHG
ECHO LVOT PEAK VELOCITY: 1 M/S
ECHO LVOT SV: 83.2 ML
ECHO LVOT VTI: 18.4 CM
ECHO MV A VELOCITY: 0.99 M/S
ECHO MV AREA PHT: 4.7 CM2
ECHO MV AREA VTI: 3.5 CM2
ECHO MV E DECELERATION TIME (DT): 158.1 MS
ECHO MV E VELOCITY: 0.54 M/S
ECHO MV E/A RATIO: 0.55
ECHO MV LVOT VTI INDEX: 1.29
ECHO MV MAX VELOCITY: 1 M/S
ECHO MV MEAN GRADIENT: 2 MMHG
ECHO MV MEAN VELOCITY: 0.7 M/S
ECHO MV PEAK GRADIENT: 4 MMHG
ECHO MV PRESSURE HALF TIME (PHT): 46.5 MS
ECHO MV VTI: 23.7 CM
ECHO PV MAX VELOCITY: 1 M/S
ECHO PV MEAN GRADIENT: 2 MMHG
ECHO PV MEAN VELOCITY: 0.7 M/S
ECHO PV PEAK GRADIENT: 4 MMHG
ECHO PV VTI: 17.4 CM
ECHO RIGHT VENTRICULAR SYSTOLIC PRESSURE (RVSP): 37 MMHG
ECHO RV INTERNAL DIMENSION: 2.9 CM
ECHO TV REGURGITANT MAX VELOCITY: 2.67 M/S
ECHO TV REGURGITANT PEAK GRADIENT: 29 MMHG
EKG ATRIAL RATE: 91 BPM
EKG P AXIS: 35 DEGREES
EKG P-R INTERVAL: 190 MS
EKG Q-T INTERVAL: 430 MS
EKG QRS DURATION: 132 MS
EKG QTC CALCULATION (BAZETT): 528 MS
EKG R AXIS: -23 DEGREES
EKG T AXIS: -7 DEGREES
EKG VENTRICULAR RATE: 91 BPM
EOSINOPHIL # BLD: 0.22 K/UL (ref 0.05–0.5)
EOSINOPHILS RELATIVE PERCENT: 2 % (ref 0–6)
ERYTHROCYTE [DISTWIDTH] IN BLOOD BY AUTOMATED COUNT: 14 % (ref 11.5–15)
HCT VFR BLD AUTO: 17.5 % (ref 37–54)
HCT VFR BLD AUTO: 21.6 % (ref 37–54)
HCT VFR BLD AUTO: 26.1 % (ref 37–54)
HGB BLD-MCNC: 5.7 G/DL (ref 12.5–16.5)
HGB BLD-MCNC: 7.1 G/DL (ref 12.5–16.5)
HGB BLD-MCNC: 8.2 G/DL (ref 12.5–16.5)
IMM GRANULOCYTES # BLD AUTO: 0.07 K/UL (ref 0–0.58)
IMM GRANULOCYTES NFR BLD: 1 % (ref 0–5)
LYMPHOCYTES NFR BLD: 2.4 K/UL (ref 1.5–4)
LYMPHOCYTES RELATIVE PERCENT: 20 % (ref 20–42)
MCH RBC QN AUTO: 32 PG (ref 26–35)
MCHC RBC AUTO-ENTMCNC: 31.4 G/DL (ref 32–34.5)
MCV RBC AUTO: 102 FL (ref 80–99.9)
MONOCYTES NFR BLD: 0.9 K/UL (ref 0.1–0.95)
MONOCYTES NFR BLD: 8 % (ref 2–12)
NEUTROPHILS NFR BLD: 70 % (ref 43–80)
NEUTS SEG NFR BLD: 8.43 K/UL (ref 1.8–7.3)
PLATELET, FLUORESCENCE: 172 K/UL (ref 130–450)
PMV BLD AUTO: 10.9 FL (ref 7–12)
PROCALCITONIN SERPL-MCNC: 0.26 NG/ML (ref 0–0.08)
RBC # BLD AUTO: 2.56 M/UL (ref 3.8–5.8)
WBC OTHER # BLD: 12.1 K/UL (ref 4.5–11.5)

## 2024-07-19 PROCEDURE — 84145 PROCALCITONIN (PCT): CPT

## 2024-07-19 PROCEDURE — 2500000003 HC RX 250 WO HCPCS: Performed by: NURSE ANESTHETIST, CERTIFIED REGISTERED

## 2024-07-19 PROCEDURE — 2580000003 HC RX 258

## 2024-07-19 PROCEDURE — 85018 HEMOGLOBIN: CPT

## 2024-07-19 PROCEDURE — 6360000002 HC RX W HCPCS: Performed by: FAMILY MEDICINE

## 2024-07-19 PROCEDURE — 99223 1ST HOSP IP/OBS HIGH 75: CPT | Performed by: INTERNAL MEDICINE

## 2024-07-19 PROCEDURE — 71046 X-RAY EXAM CHEST 2 VIEWS: CPT

## 2024-07-19 PROCEDURE — 3600000004 HC SURGERY LEVEL 4 BASE: Performed by: ORTHOPAEDIC SURGERY

## 2024-07-19 PROCEDURE — 3600000014 HC SURGERY LEVEL 4 ADDTL 15MIN: Performed by: ORTHOPAEDIC SURGERY

## 2024-07-19 PROCEDURE — C1713 ANCHOR/SCREW BN/BN,TIS/BN: HCPCS | Performed by: ORTHOPAEDIC SURGERY

## 2024-07-19 PROCEDURE — 6370000000 HC RX 637 (ALT 250 FOR IP): Performed by: FAMILY MEDICINE

## 2024-07-19 PROCEDURE — 85025 COMPLETE CBC W/AUTO DIFF WBC: CPT

## 2024-07-19 PROCEDURE — P9016 RBC LEUKOCYTES REDUCED: HCPCS

## 2024-07-19 PROCEDURE — 2720000010 HC SURG SUPPLY STERILE: Performed by: ORTHOPAEDIC SURGERY

## 2024-07-19 PROCEDURE — 36415 COLL VENOUS BLD VENIPUNCTURE: CPT

## 2024-07-19 PROCEDURE — 93306 TTE W/DOPPLER COMPLETE: CPT

## 2024-07-19 PROCEDURE — 2580000003 HC RX 258: Performed by: NURSE ANESTHETIST, CERTIFIED REGISTERED

## 2024-07-19 PROCEDURE — P9047 ALBUMIN (HUMAN), 25%, 50ML: HCPCS | Performed by: NURSE ANESTHETIST, CERTIFIED REGISTERED

## 2024-07-19 PROCEDURE — 0QS606Z REPOSITION RIGHT UPPER FEMUR WITH INTRAMEDULLARY INTERNAL FIXATION DEVICE, OPEN APPROACH: ICD-10-PCS | Performed by: ORTHOPAEDIC SURGERY

## 2024-07-19 PROCEDURE — P9047 ALBUMIN (HUMAN), 25%, 50ML: HCPCS | Performed by: FAMILY MEDICINE

## 2024-07-19 PROCEDURE — 36430 TRANSFUSION BLD/BLD COMPNT: CPT

## 2024-07-19 PROCEDURE — 93005 ELECTROCARDIOGRAM TRACING: CPT | Performed by: INTERNAL MEDICINE

## 2024-07-19 PROCEDURE — 6370000000 HC RX 637 (ALT 250 FOR IP): Performed by: INTERNAL MEDICINE

## 2024-07-19 PROCEDURE — 3700000001 HC ADD 15 MINUTES (ANESTHESIA): Performed by: ORTHOPAEDIC SURGERY

## 2024-07-19 PROCEDURE — 27245 TREAT THIGH FRACTURE: CPT | Performed by: ORTHOPAEDIC SURGERY

## 2024-07-19 PROCEDURE — 85014 HEMATOCRIT: CPT

## 2024-07-19 PROCEDURE — C1769 GUIDE WIRE: HCPCS | Performed by: ORTHOPAEDIC SURGERY

## 2024-07-19 PROCEDURE — 2580000003 HC RX 258: Performed by: INTERNAL MEDICINE

## 2024-07-19 PROCEDURE — 99232 SBSQ HOSP IP/OBS MODERATE 35: CPT | Performed by: INTERNAL MEDICINE

## 2024-07-19 PROCEDURE — 30233N1 TRANSFUSION OF NONAUTOLOGOUS RED BLOOD CELLS INTO PERIPHERAL VEIN, PERCUTANEOUS APPROACH: ICD-10-PCS | Performed by: INTERNAL MEDICINE

## 2024-07-19 PROCEDURE — 7100000000 HC PACU RECOVERY - FIRST 15 MIN: Performed by: ORTHOPAEDIC SURGERY

## 2024-07-19 PROCEDURE — 7100000001 HC PACU RECOVERY - ADDTL 15 MIN: Performed by: ORTHOPAEDIC SURGERY

## 2024-07-19 PROCEDURE — 6370000000 HC RX 637 (ALT 250 FOR IP)

## 2024-07-19 PROCEDURE — 93306 TTE W/DOPPLER COMPLETE: CPT | Performed by: INTERNAL MEDICINE

## 2024-07-19 PROCEDURE — 6360000002 HC RX W HCPCS: Performed by: NURSE ANESTHETIST, CERTIFIED REGISTERED

## 2024-07-19 PROCEDURE — 2709999900 HC NON-CHARGEABLE SUPPLY: Performed by: ORTHOPAEDIC SURGERY

## 2024-07-19 PROCEDURE — 6360000002 HC RX W HCPCS

## 2024-07-19 PROCEDURE — 3700000000 HC ANESTHESIA ATTENDED CARE: Performed by: ORTHOPAEDIC SURGERY

## 2024-07-19 PROCEDURE — 1200000000 HC SEMI PRIVATE

## 2024-07-19 PROCEDURE — 93010 ELECTROCARDIOGRAM REPORT: CPT | Performed by: INTERNAL MEDICINE

## 2024-07-19 DEVICE — SCREW BNE L110MM DIA10.35MM G TI CANN PERF FOR PROX FEM: Type: IMPLANTABLE DEVICE | Site: HIP | Status: FUNCTIONAL

## 2024-07-19 DEVICE — NAIL IM L400MM DIA12MM 130DEG LNG R PROX FEM GRN TI CANN: Type: IMPLANTABLE DEVICE | Site: HIP | Status: FUNCTIONAL

## 2024-07-19 DEVICE — SCREW BNE L48MM DIA5MM LAT FEM LT GRN TI ST LOK FULL THRD: Type: IMPLANTABLE DEVICE | Site: HIP | Status: FUNCTIONAL

## 2024-07-19 RX ORDER — ASPIRIN 325 MG
325 TABLET, DELAYED RELEASE (ENTERIC COATED) ORAL 2 TIMES DAILY
Status: DISCONTINUED | OUTPATIENT
Start: 2024-07-19 | End: 2024-07-24 | Stop reason: HOSPADM

## 2024-07-19 RX ORDER — ACETAMINOPHEN 325 MG/1
650 TABLET ORAL EVERY 6 HOURS
Status: DISCONTINUED | OUTPATIENT
Start: 2024-07-19 | End: 2024-07-24 | Stop reason: HOSPADM

## 2024-07-19 RX ORDER — NEOSTIGMINE METHYLSULFATE 1 MG/ML
INJECTION, SOLUTION INTRAVENOUS PRN
Status: DISCONTINUED | OUTPATIENT
Start: 2024-07-19 | End: 2024-07-19 | Stop reason: SDUPTHER

## 2024-07-19 RX ORDER — DIPHENHYDRAMINE HYDROCHLORIDE 50 MG/ML
12.5 INJECTION INTRAMUSCULAR; INTRAVENOUS
Status: DISCONTINUED | OUTPATIENT
Start: 2024-07-19 | End: 2024-07-19 | Stop reason: HOSPADM

## 2024-07-19 RX ORDER — ONDANSETRON 2 MG/ML
4 INJECTION INTRAMUSCULAR; INTRAVENOUS
Status: DISCONTINUED | OUTPATIENT
Start: 2024-07-19 | End: 2024-07-19 | Stop reason: HOSPADM

## 2024-07-19 RX ORDER — LABETALOL HYDROCHLORIDE 5 MG/ML
10 INJECTION, SOLUTION INTRAVENOUS
Status: DISCONTINUED | OUTPATIENT
Start: 2024-07-19 | End: 2024-07-19 | Stop reason: HOSPADM

## 2024-07-19 RX ORDER — SODIUM CHLORIDE 0.9 % (FLUSH) 0.9 %
5-40 SYRINGE (ML) INJECTION PRN
Status: DISCONTINUED | OUTPATIENT
Start: 2024-07-19 | End: 2024-07-20 | Stop reason: SDUPTHER

## 2024-07-19 RX ORDER — SODIUM CHLORIDE 9 MG/ML
INJECTION, SOLUTION INTRAVENOUS PRN
Status: DISCONTINUED | OUTPATIENT
Start: 2024-07-19 | End: 2024-07-20 | Stop reason: SDUPTHER

## 2024-07-19 RX ORDER — LIDOCAINE HYDROCHLORIDE 20 MG/ML
INJECTION, SOLUTION INTRAVENOUS PRN
Status: DISCONTINUED | OUTPATIENT
Start: 2024-07-19 | End: 2024-07-19 | Stop reason: SDUPTHER

## 2024-07-19 RX ORDER — OXYCODONE HYDROCHLORIDE 5 MG/1
5 TABLET ORAL EVERY 4 HOURS PRN
Status: DISCONTINUED | OUTPATIENT
Start: 2024-07-19 | End: 2024-07-24 | Stop reason: HOSPADM

## 2024-07-19 RX ORDER — DEXAMETHASONE SODIUM PHOSPHATE 10 MG/ML
INJECTION, SOLUTION INTRAMUSCULAR; INTRAVENOUS PRN
Status: DISCONTINUED | OUTPATIENT
Start: 2024-07-19 | End: 2024-07-19 | Stop reason: SDUPTHER

## 2024-07-19 RX ORDER — PROCHLORPERAZINE EDISYLATE 5 MG/ML
5 INJECTION INTRAMUSCULAR; INTRAVENOUS
Status: DISCONTINUED | OUTPATIENT
Start: 2024-07-19 | End: 2024-07-19 | Stop reason: HOSPADM

## 2024-07-19 RX ORDER — GLYCOPYRROLATE 0.2 MG/ML
INJECTION INTRAMUSCULAR; INTRAVENOUS PRN
Status: DISCONTINUED | OUTPATIENT
Start: 2024-07-19 | End: 2024-07-19 | Stop reason: SDUPTHER

## 2024-07-19 RX ORDER — ALBUMIN (HUMAN) 12.5 G/50ML
SOLUTION INTRAVENOUS PRN
Status: DISCONTINUED | OUTPATIENT
Start: 2024-07-19 | End: 2024-07-19 | Stop reason: SDUPTHER

## 2024-07-19 RX ORDER — HYDRALAZINE HYDROCHLORIDE 20 MG/ML
10 INJECTION INTRAMUSCULAR; INTRAVENOUS
Status: DISCONTINUED | OUTPATIENT
Start: 2024-07-19 | End: 2024-07-19 | Stop reason: HOSPADM

## 2024-07-19 RX ORDER — SODIUM CHLORIDE 9 MG/ML
INJECTION, SOLUTION INTRAVENOUS CONTINUOUS PRN
Status: DISCONTINUED | OUTPATIENT
Start: 2024-07-19 | End: 2024-07-19 | Stop reason: SDUPTHER

## 2024-07-19 RX ORDER — MORPHINE SULFATE 2 MG/ML
2 INJECTION, SOLUTION INTRAMUSCULAR; INTRAVENOUS EVERY 5 MIN PRN
Status: DISCONTINUED | OUTPATIENT
Start: 2024-07-19 | End: 2024-07-19 | Stop reason: HOSPADM

## 2024-07-19 RX ORDER — KETOROLAC TROMETHAMINE 15 MG/ML
15 INJECTION, SOLUTION INTRAMUSCULAR; INTRAVENOUS
Status: DISCONTINUED | OUTPATIENT
Start: 2024-07-19 | End: 2024-07-19 | Stop reason: HOSPADM

## 2024-07-19 RX ORDER — ROCURONIUM BROMIDE 10 MG/ML
INJECTION, SOLUTION INTRAVENOUS PRN
Status: DISCONTINUED | OUTPATIENT
Start: 2024-07-19 | End: 2024-07-19 | Stop reason: SDUPTHER

## 2024-07-19 RX ORDER — FENTANYL CITRATE 50 UG/ML
INJECTION, SOLUTION INTRAMUSCULAR; INTRAVENOUS PRN
Status: DISCONTINUED | OUTPATIENT
Start: 2024-07-19 | End: 2024-07-19 | Stop reason: SDUPTHER

## 2024-07-19 RX ORDER — ETOMIDATE 2 MG/ML
INJECTION INTRAVENOUS PRN
Status: DISCONTINUED | OUTPATIENT
Start: 2024-07-19 | End: 2024-07-19 | Stop reason: SDUPTHER

## 2024-07-19 RX ORDER — IPRATROPIUM BROMIDE AND ALBUTEROL SULFATE 2.5; .5 MG/3ML; MG/3ML
1 SOLUTION RESPIRATORY (INHALATION)
Status: DISCONTINUED | OUTPATIENT
Start: 2024-07-19 | End: 2024-07-19 | Stop reason: HOSPADM

## 2024-07-19 RX ORDER — ONDANSETRON 2 MG/ML
INJECTION INTRAMUSCULAR; INTRAVENOUS PRN
Status: DISCONTINUED | OUTPATIENT
Start: 2024-07-19 | End: 2024-07-19 | Stop reason: SDUPTHER

## 2024-07-19 RX ORDER — CEFAZOLIN 2 G/1
INJECTION, POWDER, FOR SOLUTION INTRAMUSCULAR; INTRAVENOUS
Status: DISPENSED
Start: 2024-07-19 | End: 2024-07-20

## 2024-07-19 RX ORDER — MORPHINE SULFATE 4 MG/ML
4 INJECTION, SOLUTION INTRAMUSCULAR; INTRAVENOUS
Status: DISCONTINUED | OUTPATIENT
Start: 2024-07-19 | End: 2024-07-24 | Stop reason: HOSPADM

## 2024-07-19 RX ORDER — MEPERIDINE HYDROCHLORIDE 25 MG/ML
12.5 INJECTION INTRAMUSCULAR; INTRAVENOUS; SUBCUTANEOUS EVERY 5 MIN PRN
Status: DISCONTINUED | OUTPATIENT
Start: 2024-07-19 | End: 2024-07-19 | Stop reason: HOSPADM

## 2024-07-19 RX ORDER — OXYCODONE HYDROCHLORIDE AND ACETAMINOPHEN 5; 325 MG/1; MG/1
1 TABLET ORAL EVERY 6 HOURS PRN
Qty: 28 TABLET | Refills: 0 | Status: SHIPPED | OUTPATIENT
Start: 2024-07-19 | End: 2024-07-26

## 2024-07-19 RX ORDER — EPHEDRINE SULFATE/0.9% NACL/PF 25 MG/5 ML
SYRINGE (ML) INTRAVENOUS PRN
Status: DISCONTINUED | OUTPATIENT
Start: 2024-07-19 | End: 2024-07-19 | Stop reason: SDUPTHER

## 2024-07-19 RX ORDER — ASPIRIN 325 MG
325 TABLET ORAL 2 TIMES DAILY
Qty: 60 TABLET | Refills: 0 | Status: SHIPPED | OUTPATIENT
Start: 2024-07-19 | End: 2024-08-18

## 2024-07-19 RX ORDER — CEFAZOLIN SODIUM 2 G/50ML
SOLUTION INTRAVENOUS PRN
Status: DISCONTINUED | OUTPATIENT
Start: 2024-07-19 | End: 2024-07-19 | Stop reason: SDUPTHER

## 2024-07-19 RX ORDER — SODIUM CHLORIDE 9 MG/ML
INJECTION, SOLUTION INTRAVENOUS CONTINUOUS
Status: ACTIVE | OUTPATIENT
Start: 2024-07-19 | End: 2024-07-20

## 2024-07-19 RX ORDER — WATER 10 ML/10ML
INJECTION INTRAMUSCULAR; INTRAVENOUS; SUBCUTANEOUS
Status: DISPENSED
Start: 2024-07-19 | End: 2024-07-20

## 2024-07-19 RX ORDER — SODIUM CHLORIDE, SODIUM LACTATE, POTASSIUM CHLORIDE, CALCIUM CHLORIDE 600; 310; 30; 20 MG/100ML; MG/100ML; MG/100ML; MG/100ML
INJECTION, SOLUTION INTRAVENOUS CONTINUOUS PRN
Status: DISCONTINUED | OUTPATIENT
Start: 2024-07-19 | End: 2024-07-19 | Stop reason: SDUPTHER

## 2024-07-19 RX ORDER — MORPHINE SULFATE 2 MG/ML
2 INJECTION, SOLUTION INTRAMUSCULAR; INTRAVENOUS
Status: DISCONTINUED | OUTPATIENT
Start: 2024-07-19 | End: 2024-07-24 | Stop reason: HOSPADM

## 2024-07-19 RX ORDER — NALOXONE HYDROCHLORIDE 0.4 MG/ML
INJECTION, SOLUTION INTRAMUSCULAR; INTRAVENOUS; SUBCUTANEOUS PRN
Status: DISCONTINUED | OUTPATIENT
Start: 2024-07-19 | End: 2024-07-19 | Stop reason: HOSPADM

## 2024-07-19 RX ORDER — MIDAZOLAM HYDROCHLORIDE 1 MG/ML
2 INJECTION INTRAMUSCULAR; INTRAVENOUS
Status: DISCONTINUED | OUTPATIENT
Start: 2024-07-19 | End: 2024-07-19 | Stop reason: HOSPADM

## 2024-07-19 RX ORDER — SODIUM CHLORIDE 0.9 % (FLUSH) 0.9 %
5-40 SYRINGE (ML) INJECTION EVERY 12 HOURS SCHEDULED
Status: DISCONTINUED | OUTPATIENT
Start: 2024-07-19 | End: 2024-07-20 | Stop reason: SDUPTHER

## 2024-07-19 RX ADMIN — OXYCODONE HYDROCHLORIDE AND ACETAMINOPHEN 500 MG: 500 TABLET ORAL at 09:10

## 2024-07-19 RX ADMIN — TIMOLOL MALEATE 1 DROP: 5 SOLUTION OPHTHALMIC at 09:19

## 2024-07-19 RX ADMIN — TRIAMCINOLONE ACETONIDE: 1 CREAM TOPICAL at 03:35

## 2024-07-19 RX ADMIN — FENTANYL CITRATE 50 MCG: 50 INJECTION, SOLUTION INTRAMUSCULAR; INTRAVENOUS at 14:21

## 2024-07-19 RX ADMIN — Medication 10 ML: at 09:10

## 2024-07-19 RX ADMIN — MIDODRINE HYDROCHLORIDE 10 MG: 5 TABLET ORAL at 00:09

## 2024-07-19 RX ADMIN — LEVOTHYROXINE SODIUM 25 MCG: 25 TABLET ORAL at 06:39

## 2024-07-19 RX ADMIN — PREDNISONE 10 MG: 10 TABLET ORAL at 09:10

## 2024-07-19 RX ADMIN — TIMOLOL MALEATE 1 DROP: 5 SOLUTION OPHTHALMIC at 03:35

## 2024-07-19 RX ADMIN — TRIAMCINOLONE ACETONIDE: 1 CREAM TOPICAL at 21:10

## 2024-07-19 RX ADMIN — TRIAMCINOLONE ACETONIDE: 1 CREAM TOPICAL at 09:19

## 2024-07-19 RX ADMIN — CEFAZOLIN SODIUM 2000 MG: 2 SOLUTION INTRAVENOUS at 13:16

## 2024-07-19 RX ADMIN — CITALOPRAM HYDROBROMIDE 20 MG: 20 TABLET ORAL at 09:11

## 2024-07-19 RX ADMIN — ASPIRIN 81 MG: 81 TABLET, COATED ORAL at 09:10

## 2024-07-19 RX ADMIN — ALBUMIN (HUMAN) 50 G: 0.25 INJECTION, SOLUTION INTRAVENOUS at 00:08

## 2024-07-19 RX ADMIN — EPHEDRINE SULFATE 5 MG: 5 INJECTION INTRAVENOUS at 14:38

## 2024-07-19 RX ADMIN — Medication 10 ML: at 00:11

## 2024-07-19 RX ADMIN — Medication 3 MG: at 14:49

## 2024-07-19 RX ADMIN — ETOMIDATE 120 MG: 2 INJECTION, SOLUTION INTRAVENOUS at 13:09

## 2024-07-19 RX ADMIN — Medication 10 ML: at 09:11

## 2024-07-19 RX ADMIN — ACETAMINOPHEN 650 MG: 325 TABLET ORAL at 19:09

## 2024-07-19 RX ADMIN — Medication 10 ML: at 21:00

## 2024-07-19 RX ADMIN — CEFAZOLIN 2000 MG: 2 INJECTION, POWDER, FOR SOLUTION INTRAMUSCULAR; INTRAVENOUS at 22:32

## 2024-07-19 RX ADMIN — ROCURONIUM BROMIDE 50 MG: 10 SOLUTION INTRAVENOUS at 13:09

## 2024-07-19 RX ADMIN — PHENYLEPHRINE HYDROCHLORIDE 100 MCG: 10 INJECTION INTRAVENOUS at 13:40

## 2024-07-19 RX ADMIN — EPHEDRINE SULFATE 5 MG: 5 INJECTION INTRAVENOUS at 14:31

## 2024-07-19 RX ADMIN — PHENYLEPHRINE HYDROCHLORIDE 100 MCG: 10 INJECTION INTRAVENOUS at 13:42

## 2024-07-19 RX ADMIN — ONDANSETRON 4 MG: 2 INJECTION INTRAMUSCULAR; INTRAVENOUS at 14:45

## 2024-07-19 RX ADMIN — TIMOLOL MALEATE 1 DROP: 5 SOLUTION OPHTHALMIC at 21:09

## 2024-07-19 RX ADMIN — ASPIRIN 325 MG: 325 TABLET, COATED ORAL at 21:09

## 2024-07-19 RX ADMIN — GLYCOPYRROLATE 0.4 MG: 0.2 INJECTION INTRAMUSCULAR; INTRAVENOUS at 14:49

## 2024-07-19 RX ADMIN — EPHEDRINE SULFATE 5 MG: 5 INJECTION INTRAVENOUS at 14:34

## 2024-07-19 RX ADMIN — FENTANYL CITRATE 100 MCG: 50 INJECTION, SOLUTION INTRAMUSCULAR; INTRAVENOUS at 13:09

## 2024-07-19 RX ADMIN — ALBUMIN (HUMAN) 25 G: 25 SOLUTION INTRAVENOUS at 14:34

## 2024-07-19 RX ADMIN — MORPHINE SULFATE 2 MG: 2 INJECTION, SOLUTION INTRAMUSCULAR; INTRAVENOUS at 22:34

## 2024-07-19 RX ADMIN — PHENYLEPHRINE HYDROCHLORIDE 100 MCG: 10 INJECTION INTRAVENOUS at 13:48

## 2024-07-19 RX ADMIN — ACETAMINOPHEN 650 MG: 325 TABLET ORAL at 04:13

## 2024-07-19 RX ADMIN — LIDOCAINE HYDROCHLORIDE 80 MG: 20 INJECTION, SOLUTION INTRAVENOUS at 13:09

## 2024-07-19 RX ADMIN — SODIUM CHLORIDE, POTASSIUM CHLORIDE, SODIUM LACTATE AND CALCIUM CHLORIDE: 600; 310; 30; 20 INJECTION, SOLUTION INTRAVENOUS at 14:25

## 2024-07-19 RX ADMIN — SODIUM CHLORIDE: 9 INJECTION, SOLUTION INTRAVENOUS at 19:10

## 2024-07-19 RX ADMIN — DEXAMETHASONE SODIUM PHOSPHATE 10 MG: 10 INJECTION, SOLUTION INTRAMUSCULAR; INTRAVENOUS at 13:19

## 2024-07-19 RX ADMIN — SODIUM CHLORIDE: 9 INJECTION, SOLUTION INTRAVENOUS at 13:04

## 2024-07-19 NOTE — OP NOTE
Diagnosis: Comminuted intertrochanteric fracture R proximal femur    Procedure: ORIF  with locked cephalomedullary nail fixation     Surgeon:  Osiel    Assistants: Petrona Huber     Anesthetic:  General    EBL: 200 cc    Complications:  None    Disposition:  Stable to reconery

## 2024-07-19 NOTE — CONSULTS
Inpatient Cardiology Consultation      Reason for Consult:  Abnormal CTA chest     Consulting Physician: Dr. Rabago    Requesting Physician:  Cheryl Cook DO     Date of Consultation: 7/19/2024  Previously known to Dr. Fuentes and new to [facility] cardiology.     HISTORY OF PRESENT ILLNESS:   Mr. Schreiber is a 89-year-old male with H/O  hypothyroidism on HRT, HTN, HLD, depression, BPH, dementia, PUD, COPD  and GERD admitted with hip pain. Poor historian and most of the history was obtained from the medical records. Had recurrent falls and was discharged home on 7/10/24.  Admitted on 7/18/2024 with low BP of 70/40. He was also noted to have cough with yellow sputum.. He is also noted to have Rt hip intertrochanteric fracture and scheduled for a surgery.   CTA chest showed no PE with small bilateral pleural effusions. LV wall thickening of uncertain etiology and cardiology service was consulted.  Mid inferior wall showing thickness of 34 mm ( unknown phase of the cardiac cycle). Patient denies any chest pain or dyspnea.  He fell on Wed night and sustained a fracture to right hip. Denies LOC.       Please note: past medical records were reviewed per electronic medical record (EMR) - see detailed reports under Past Medical/ Surgical History.   Past Medical History:    Past Medical History:   Diagnosis Date    Arthritis     Asthma     COPD (chronic obstructive pulmonary disease) (HCC)     Depression     Hiatal hernia     Hx of solitary pulmonary nodule     Hyperglycemia     Hyperlipidemia     Hypertension     Macular degeneration     Neuropathy     Osteoarthritis     Peptic ulcer     PONV (postoperative nausea and vomiting)     Thyroid disease        Past Surgical History:    Past Surgical History:   Procedure Laterality Date    BACK SURGERY      1989    COLONOSCOPY      ENDOSCOPY, COLON, DIAGNOSTIC      EYE SURGERY      viviane cataracts    HERNIA REPAIR      x2  1955, 1973    HERNIA REPAIR Right 3/9/2020

## 2024-07-19 NOTE — CARE COORDINATION
7/19/2024: SS NOTE:  Per IDR & nursing report anticipated pt having orthopedic surgical repair of his hip fracture later today 7/19, pending medical clearance, discharge plan for skilled return to [facility], [facility] liaison will follow post-op for PT/OT evals to SUBMIT PRE CERT NEEDED prior to pt's return, SCOOBY initiated.Electronically signed by STEPHEN Deutsch on 7/19/2024 at 11:28 AM

## 2024-07-19 NOTE — ANESTHESIA POSTPROCEDURE EVALUATION
Department of Anesthesiology  Postprocedure Note    Patient: Dariusz Schreiber  MRN: [id number]  YOB: 1935  Date of evaluation: 7/19/2024    Procedure Summary       Date: 07/19/24 Room / Location: [address]    Anesthesia Start: 1304 Anesthesia Stop: 1514    Procedure: RIGHT HIP OPEN REDUCTION INTERNAL FIXATION (Right: Hip) Diagnosis:       Closed fracture of right hip, initial encounter ([facility])      (Closed fracture of right hip, initial encounter ([facility]) [S72.001A])    Surgeons: LUCIO Cartagena DO Responsible Provider: Chacorta Medina MD    Anesthesia Type: general ASA Status: 3            Anesthesia Type: No value filed.    Hossein Phase I:      Hossein Phase II: Hossein Score: 8    Anesthesia Post Evaluation    Patient location during evaluation: PACU  Patient participation: complete - patient participated  Level of consciousness: awake  Airway patency: patent  Nausea & Vomiting: no nausea and no vomiting  Cardiovascular status: hemodynamically stable  Respiratory status: acceptable  Hydration status: euvolemic  Pain management: adequate    No notable events documented.

## 2024-07-19 NOTE — DISCHARGE INSTRUCTIONS
Your information:  Name: Dariusz Schreiber  : 1935    Your instructions:    Discharge to [facility].  Follow up with primary care provider and specialists as directed.    Orthopaedics Discharge Instructions   WBAT on right lower extremity  Pain medication Per Prescriptions  Contact Office for Medication Refill- [phone]  Office can refill pain med every 7 days  If patient discharging to facility then pain control will be continued per facility physician  Ice to operative/injured site for 15-30 minutes of each hour for next 5 days    Recommend that you continue to ice the area 2-3 times per day after this   Elevate operative/injured limb on 2 pillows at home  Goal is to have limb above the heart if able  Wound care -dressings remain clean dry intact.  On postoperative day 7 can be removed and surgical site can be clean with soapy water.  Fracture Care -  If your splint/cast becomes too tight, too loose, wet or damaged please contact our office right away we will need to change out the splint/cast.    DVT prophylaxis,  mg BID.    Follow Up in Office in 2 weeks.     Call the office at [phone] for directions or with any questions.  Watch for these significant complications.  Call physician if they or any other problems occur:  Fever over 101°, redness, swelling or warmth at the operative site  Unrelieved nausea    Foul smelling or cloudy drainage at the operative site   Unrelieved pain    Blood soaked dressing. (Some oozing may be normal)     Numb, pale, blue, cold or tingling extremity     What to do after you leave the hospital:    Recommended diet: regular diet    Recommended activity: activity as tolerated    The following personal items were collected during your admission and were returned to you:    Belongings  Dental Appliances: None  Vision - Corrective Lenses: None  Hearing Aid: None  Clothing: Socks  Jewelry: None  Electronic Devices: None  Weapons (Notify Protective Services/Security):

## 2024-07-19 NOTE — ANESTHESIA PRE PROCEDURE
pulmonary nodule     Hyperglycemia     Hyperlipidemia     Hypertension     Macular degeneration     Neuropathy     Osteoarthritis     Peptic ulcer     PONV (postoperative nausea and vomiting)     Thyroid disease        Past Surgical History:        Procedure Laterality Date    BACK SURGERY      1989    COLONOSCOPY      ENDOSCOPY, COLON, DIAGNOSTIC      EYE SURGERY      viviane cataracts    HERNIA REPAIR      x2  1955, 1973    HERNIA REPAIR Right 3/9/2020    LAPAROSCOPIC HERNIA RIGHT  INGUINAL REPAIR WITH MESH ROBOTIC XI performed by Jose M Gr MD at [facility] OR    SINUS SURGERY         Social History:    Social History     Tobacco Use    Smoking status: Never    Smokeless tobacco: Never   Substance Use Topics    Alcohol use: Yes     Comment: on occasion                                Counseling given: Not Answered      Vital Signs (Current):   Vitals:    07/19/24 0532 07/19/24 0915 07/19/24 1145 07/19/24 1201   BP: (!) 109/59 138/76 139/77    Pulse: 83 77 78    Resp: 14 16     Temp: 98.3 °F (36.8 °C)      TempSrc: Oral      SpO2: 96% 99%     Height:    1.753 m (5' 9.02\")                                              BP Readings from Last 3 Encounters:   07/19/24 139/77   07/10/24 (!) 145/84   03/15/24 (!) 158/97       NPO Status:                                                                                 BMI:   Wt Readings from Last 3 Encounters:   07/10/24 63.9 kg (140 lb 12.8 oz)   03/15/24 70.3 kg (155 lb)   03/27/23 67.6 kg (149 lb)     Body mass index is 20.78 kg/m².    CBC:   Lab Results   Component Value Date/Time    WBC 12.1 07/19/2024 04:11 AM    RBC 2.56 07/19/2024 04:11 AM    HGB 8.2 07/19/2024 04:11 AM    HCT 26.1 07/19/2024 04:11 AM    .0 07/19/2024 04:11 AM    RDW 14.0 07/19/2024 04:11 AM     07/09/2024 08:38 AM       CMP:   Lab Results   Component Value Date/Time     07/18/2024 11:32 AM    K 4.8 07/18/2024 11:32 AM    K 4.2 05/27/2021 02:25 PM     07/18/2024 11:32 AM    CO2

## 2024-07-20 LAB
ANION GAP SERPL CALCULATED.3IONS-SCNC: 7 MMOL/L (ref 7–16)
BUN SERPL-MCNC: 35 MG/DL (ref 6–23)
CALCIUM SERPL-MCNC: 7.9 MG/DL (ref 8.6–10.2)
CHLORIDE SERPL-SCNC: 106 MMOL/L (ref 98–107)
CO2 SERPL-SCNC: 26 MMOL/L (ref 22–29)
CREAT SERPL-MCNC: 0.9 MG/DL (ref 0.7–1.2)
ERYTHROCYTE [DISTWIDTH] IN BLOOD BY AUTOMATED COUNT: 16.4 % (ref 11.5–15)
GFR, ESTIMATED: 78 ML/MIN/1.73M2
GLUCOSE SERPL-MCNC: 155 MG/DL (ref 74–99)
HCT VFR BLD AUTO: 19.2 % (ref 37–54)
HCT VFR BLD AUTO: 27.3 % (ref 37–54)
HGB BLD-MCNC: 6.4 G/DL (ref 12.5–16.5)
HGB BLD-MCNC: 9.3 G/DL (ref 12.5–16.5)
MCH RBC QN AUTO: 32.2 PG (ref 26–35)
MCHC RBC AUTO-ENTMCNC: 33.3 G/DL (ref 32–34.5)
MCV RBC AUTO: 96.5 FL (ref 80–99.9)
MICROORGANISM SPEC CULT: NORMAL
MICROORGANISM/AGENT SPEC: NORMAL
PLATELET # BLD AUTO: 129 K/UL (ref 130–450)
PMV BLD AUTO: 10.8 FL (ref 7–12)
POTASSIUM SERPL-SCNC: 4.7 MMOL/L (ref 3.5–5)
RBC # BLD AUTO: 1.99 M/UL (ref 3.8–5.8)
SERVICE CMNT-IMP: NORMAL
SODIUM SERPL-SCNC: 139 MMOL/L (ref 132–146)
SPECIMEN DESCRIPTION: NORMAL
WBC OTHER # BLD: 10.6 K/UL (ref 4.5–11.5)

## 2024-07-20 PROCEDURE — 94669 MECHANICAL CHEST WALL OSCILL: CPT

## 2024-07-20 PROCEDURE — 2580000003 HC RX 258: Performed by: INTERNAL MEDICINE

## 2024-07-20 PROCEDURE — 97110 THERAPEUTIC EXERCISES: CPT | Performed by: PHYSICAL THERAPIST

## 2024-07-20 PROCEDURE — 6360000002 HC RX W HCPCS

## 2024-07-20 PROCEDURE — 6370000000 HC RX 637 (ALT 250 FOR IP): Performed by: INTERNAL MEDICINE

## 2024-07-20 PROCEDURE — 85014 HEMATOCRIT: CPT

## 2024-07-20 PROCEDURE — P9016 RBC LEUKOCYTES REDUCED: HCPCS

## 2024-07-20 PROCEDURE — 97161 PT EVAL LOW COMPLEX 20 MIN: CPT | Performed by: PHYSICAL THERAPIST

## 2024-07-20 PROCEDURE — 85027 COMPLETE CBC AUTOMATED: CPT

## 2024-07-20 PROCEDURE — 85018 HEMOGLOBIN: CPT

## 2024-07-20 PROCEDURE — 36430 TRANSFUSION BLD/BLD COMPNT: CPT

## 2024-07-20 PROCEDURE — 97530 THERAPEUTIC ACTIVITIES: CPT | Performed by: PHYSICAL THERAPIST

## 2024-07-20 PROCEDURE — 36415 COLL VENOUS BLD VENIPUNCTURE: CPT

## 2024-07-20 PROCEDURE — 6370000000 HC RX 637 (ALT 250 FOR IP)

## 2024-07-20 PROCEDURE — 1200000000 HC SEMI PRIVATE

## 2024-07-20 PROCEDURE — 2580000003 HC RX 258

## 2024-07-20 PROCEDURE — 80048 BASIC METABOLIC PNL TOTAL CA: CPT

## 2024-07-20 PROCEDURE — 99232 SBSQ HOSP IP/OBS MODERATE 35: CPT | Performed by: INTERNAL MEDICINE

## 2024-07-20 PROCEDURE — 6360000002 HC RX W HCPCS: Performed by: INTERNAL MEDICINE

## 2024-07-20 RX ORDER — SODIUM CHLORIDE 9 MG/ML
INJECTION, SOLUTION INTRAVENOUS PRN
Status: DISCONTINUED | OUTPATIENT
Start: 2024-07-20 | End: 2024-07-20 | Stop reason: ALTCHOICE

## 2024-07-20 RX ADMIN — TRIAMCINOLONE ACETONIDE: 1 CREAM TOPICAL at 21:05

## 2024-07-20 RX ADMIN — PREDNISONE 10 MG: 10 TABLET ORAL at 08:01

## 2024-07-20 RX ADMIN — ENOXAPARIN SODIUM 40 MG: 100 INJECTION SUBCUTANEOUS at 08:10

## 2024-07-20 RX ADMIN — ASPIRIN 325 MG: 325 TABLET, COATED ORAL at 08:00

## 2024-07-20 RX ADMIN — SODIUM CHLORIDE, PRESERVATIVE FREE 10 ML: 5 INJECTION INTRAVENOUS at 08:13

## 2024-07-20 RX ADMIN — ASPIRIN 325 MG: 325 TABLET, COATED ORAL at 21:04

## 2024-07-20 RX ADMIN — MORPHINE SULFATE 2 MG: 2 INJECTION, SOLUTION INTRAMUSCULAR; INTRAVENOUS at 21:29

## 2024-07-20 RX ADMIN — ACETAMINOPHEN 650 MG: 325 TABLET ORAL at 21:04

## 2024-07-20 RX ADMIN — Medication 10 ML: at 08:14

## 2024-07-20 RX ADMIN — LEVOTHYROXINE SODIUM 25 MCG: 25 TABLET ORAL at 06:12

## 2024-07-20 RX ADMIN — TIMOLOL MALEATE 1 DROP: 5 SOLUTION OPHTHALMIC at 08:12

## 2024-07-20 RX ADMIN — TRIAMCINOLONE ACETONIDE: 1 CREAM TOPICAL at 08:08

## 2024-07-20 RX ADMIN — OXYCODONE HYDROCHLORIDE AND ACETAMINOPHEN 500 MG: 500 TABLET ORAL at 08:00

## 2024-07-20 RX ADMIN — SODIUM CHLORIDE: 9 INJECTION, SOLUTION INTRAVENOUS at 16:53

## 2024-07-20 RX ADMIN — CITALOPRAM HYDROBROMIDE 20 MG: 20 TABLET ORAL at 08:00

## 2024-07-20 RX ADMIN — ACETAMINOPHEN 650 MG: 325 TABLET ORAL at 06:08

## 2024-07-20 RX ADMIN — TIMOLOL MALEATE 1 DROP: 5 SOLUTION OPHTHALMIC at 21:05

## 2024-07-20 RX ADMIN — CEFAZOLIN 2000 MG: 2 INJECTION, POWDER, FOR SOLUTION INTRAMUSCULAR; INTRAVENOUS at 13:46

## 2024-07-20 RX ADMIN — CEFAZOLIN 2000 MG: 2 INJECTION, POWDER, FOR SOLUTION INTRAMUSCULAR; INTRAVENOUS at 06:06

## 2024-07-20 RX ADMIN — Medication 10 ML: at 21:08

## 2024-07-20 NOTE — CARE COORDINATION
7/20/2024: SS NOTE:  SS Consult noted for d/c planning, pt had surgery for an ORIF of his right hip fracture 7/19, pt is from [facility], [facility] liaison is following for PT/OT adore to SUBMIT PRE CERT on Monday 7/22, sw/cm to follow, SCOOBY initiated.Electronically signed by STEPHEN Deutsch on 7/19/2024 at 11:28 AM

## 2024-07-21 LAB
ABO/RH: NORMAL
ANION GAP SERPL CALCULATED.3IONS-SCNC: 8 MMOL/L (ref 7–16)
ANTIBODY SCREEN: NEGATIVE
ARM BAND NUMBER: NORMAL
BLOOD BANK BLOOD PRODUCT EXPIRATION DATE: NORMAL
BLOOD BANK BLOOD PRODUCT EXPIRATION DATE: NORMAL
BLOOD BANK DISPENSE STATUS: NORMAL
BLOOD BANK DISPENSE STATUS: NORMAL
BLOOD BANK ISBT PRODUCT BLOOD TYPE: 600
BLOOD BANK ISBT PRODUCT BLOOD TYPE: 9500
BLOOD BANK PRODUCT CODE: NORMAL
BLOOD BANK PRODUCT CODE: NORMAL
BLOOD BANK SAMPLE EXPIRATION: NORMAL
BLOOD BANK UNIT TYPE AND RH: NORMAL
BLOOD BANK UNIT TYPE AND RH: NORMAL
BPU ID: NORMAL
BPU ID: NORMAL
BUN SERPL-MCNC: 38 MG/DL (ref 6–23)
CALCIUM SERPL-MCNC: 8 MG/DL (ref 8.6–10.2)
CHLORIDE SERPL-SCNC: 107 MMOL/L (ref 98–107)
CO2 SERPL-SCNC: 24 MMOL/L (ref 22–29)
COMPONENT: NORMAL
COMPONENT: NORMAL
CREAT SERPL-MCNC: 1 MG/DL (ref 0.7–1.2)
CROSSMATCH RESULT: NORMAL
CROSSMATCH RESULT: NORMAL
ERYTHROCYTE [DISTWIDTH] IN BLOOD BY AUTOMATED COUNT: 16.3 % (ref 11.5–15)
GFR, ESTIMATED: 77 ML/MIN/1.73M2
GLUCOSE SERPL-MCNC: 109 MG/DL (ref 74–99)
HCT VFR BLD AUTO: 27.2 % (ref 37–54)
HGB BLD-MCNC: 8.8 G/DL (ref 12.5–16.5)
MCH RBC QN AUTO: 32 PG (ref 26–35)
MCHC RBC AUTO-ENTMCNC: 32.4 G/DL (ref 32–34.5)
MCV RBC AUTO: 98.9 FL (ref 80–99.9)
PLATELET # BLD AUTO: 117 K/UL (ref 130–450)
PMV BLD AUTO: 10.7 FL (ref 7–12)
POTASSIUM SERPL-SCNC: 4.4 MMOL/L (ref 3.5–5)
RBC # BLD AUTO: 2.75 M/UL (ref 3.8–5.8)
SODIUM SERPL-SCNC: 139 MMOL/L (ref 132–146)
TRANSFUSION STATUS: NORMAL
TRANSFUSION STATUS: NORMAL
UNIT DIVISION: 0
UNIT DIVISION: 0
UNIT ISSUE DATE/TIME: NORMAL
UNIT ISSUE DATE/TIME: NORMAL
WBC OTHER # BLD: 10.4 K/UL (ref 4.5–11.5)

## 2024-07-21 PROCEDURE — 97530 THERAPEUTIC ACTIVITIES: CPT

## 2024-07-21 PROCEDURE — 6360000002 HC RX W HCPCS: Performed by: INTERNAL MEDICINE

## 2024-07-21 PROCEDURE — 80048 BASIC METABOLIC PNL TOTAL CA: CPT

## 2024-07-21 PROCEDURE — 99232 SBSQ HOSP IP/OBS MODERATE 35: CPT | Performed by: INTERNAL MEDICINE

## 2024-07-21 PROCEDURE — 36415 COLL VENOUS BLD VENIPUNCTURE: CPT

## 2024-07-21 PROCEDURE — 85027 COMPLETE CBC AUTOMATED: CPT

## 2024-07-21 PROCEDURE — 6370000000 HC RX 637 (ALT 250 FOR IP): Performed by: FAMILY MEDICINE

## 2024-07-21 PROCEDURE — 2700000000 HC OXYGEN THERAPY PER DAY

## 2024-07-21 PROCEDURE — 97110 THERAPEUTIC EXERCISES: CPT

## 2024-07-21 PROCEDURE — 6370000000 HC RX 637 (ALT 250 FOR IP)

## 2024-07-21 PROCEDURE — 1200000000 HC SEMI PRIVATE

## 2024-07-21 PROCEDURE — 2580000003 HC RX 258: Performed by: INTERNAL MEDICINE

## 2024-07-21 PROCEDURE — 6370000000 HC RX 637 (ALT 250 FOR IP): Performed by: INTERNAL MEDICINE

## 2024-07-21 PROCEDURE — 94669 MECHANICAL CHEST WALL OSCILL: CPT

## 2024-07-21 RX ORDER — LISINOPRIL 5 MG/1
5 TABLET ORAL 2 TIMES DAILY
Status: DISCONTINUED | OUTPATIENT
Start: 2024-07-21 | End: 2024-07-24 | Stop reason: HOSPADM

## 2024-07-21 RX ADMIN — Medication 10 ML: at 21:44

## 2024-07-21 RX ADMIN — TIMOLOL MALEATE 1 DROP: 5 SOLUTION OPHTHALMIC at 08:29

## 2024-07-21 RX ADMIN — CITALOPRAM HYDROBROMIDE 20 MG: 20 TABLET ORAL at 08:30

## 2024-07-21 RX ADMIN — OXYCODONE HYDROCHLORIDE AND ACETAMINOPHEN 500 MG: 500 TABLET ORAL at 08:30

## 2024-07-21 RX ADMIN — OXYCODONE 5 MG: 5 TABLET ORAL at 09:36

## 2024-07-21 RX ADMIN — TRIAMCINOLONE ACETONIDE: 1 CREAM TOPICAL at 21:43

## 2024-07-21 RX ADMIN — LEVOTHYROXINE SODIUM 25 MCG: 25 TABLET ORAL at 05:42

## 2024-07-21 RX ADMIN — PREDNISONE 10 MG: 10 TABLET ORAL at 08:30

## 2024-07-21 RX ADMIN — OXYCODONE 5 MG: 5 TABLET ORAL at 05:42

## 2024-07-21 RX ADMIN — LISINOPRIL 5 MG: 5 TABLET ORAL at 10:58

## 2024-07-21 RX ADMIN — ACETAMINOPHEN 650 MG: 325 TABLET ORAL at 21:44

## 2024-07-21 RX ADMIN — MIDODRINE HYDROCHLORIDE 10 MG: 5 TABLET ORAL at 08:29

## 2024-07-21 RX ADMIN — TRIAMCINOLONE ACETONIDE: 1 CREAM TOPICAL at 08:29

## 2024-07-21 RX ADMIN — ENOXAPARIN SODIUM 40 MG: 100 INJECTION SUBCUTANEOUS at 08:29

## 2024-07-21 RX ADMIN — ACETAMINOPHEN 650 MG: 325 TABLET ORAL at 05:41

## 2024-07-21 RX ADMIN — ACETAMINOPHEN 650 MG: 325 TABLET ORAL at 12:58

## 2024-07-21 RX ADMIN — TIMOLOL MALEATE 1 DROP: 5 SOLUTION OPHTHALMIC at 23:08

## 2024-07-21 RX ADMIN — ASPIRIN 325 MG: 325 TABLET, COATED ORAL at 21:44

## 2024-07-21 RX ADMIN — ASPIRIN 325 MG: 325 TABLET, COATED ORAL at 08:30

## 2024-07-22 PROBLEM — I10 PRIMARY HYPERTENSION: Status: ACTIVE | Noted: 2024-07-22

## 2024-07-22 PROBLEM — J98.11 ATELECTASIS: Status: ACTIVE | Noted: 2024-07-22

## 2024-07-22 PROBLEM — S72.141A CLOSED COMMINUTED INTERTROCHANTERIC FRACTURE OF PROXIMAL END OF RIGHT FEMUR (HCC): Status: ACTIVE | Noted: 2024-07-22

## 2024-07-22 PROBLEM — D62 ANEMIA ASSOCIATED WITH ACUTE BLOOD LOSS: Status: ACTIVE | Noted: 2024-07-22

## 2024-07-22 LAB
ANION GAP SERPL CALCULATED.3IONS-SCNC: 6 MMOL/L (ref 7–16)
BUN SERPL-MCNC: 35 MG/DL (ref 6–23)
CALCIUM SERPL-MCNC: 8.1 MG/DL (ref 8.6–10.2)
CHLORIDE SERPL-SCNC: 108 MMOL/L (ref 98–107)
CO2 SERPL-SCNC: 26 MMOL/L (ref 22–29)
CREAT SERPL-MCNC: 0.8 MG/DL (ref 0.7–1.2)
ERYTHROCYTE [DISTWIDTH] IN BLOOD BY AUTOMATED COUNT: 15.7 % (ref 11.5–15)
GFR, ESTIMATED: 86 ML/MIN/1.73M2
GLUCOSE SERPL-MCNC: 105 MG/DL (ref 74–99)
HCT VFR BLD AUTO: 25.7 % (ref 37–54)
HGB BLD-MCNC: 8.3 G/DL (ref 12.5–16.5)
MCH RBC QN AUTO: 31.6 PG (ref 26–35)
MCHC RBC AUTO-ENTMCNC: 32.3 G/DL (ref 32–34.5)
MCV RBC AUTO: 97.7 FL (ref 80–99.9)
PLATELET # BLD AUTO: 159 K/UL (ref 130–450)
PMV BLD AUTO: 11.1 FL (ref 7–12)
POTASSIUM SERPL-SCNC: 4.4 MMOL/L (ref 3.5–5)
RBC # BLD AUTO: 2.63 M/UL (ref 3.8–5.8)
SODIUM SERPL-SCNC: 140 MMOL/L (ref 132–146)
WBC OTHER # BLD: 9.9 K/UL (ref 4.5–11.5)

## 2024-07-22 PROCEDURE — 2580000003 HC RX 258: Performed by: INTERNAL MEDICINE

## 2024-07-22 PROCEDURE — 6370000000 HC RX 637 (ALT 250 FOR IP): Performed by: INTERNAL MEDICINE

## 2024-07-22 PROCEDURE — 97110 THERAPEUTIC EXERCISES: CPT | Performed by: PHYSICAL THERAPIST

## 2024-07-22 PROCEDURE — 85027 COMPLETE CBC AUTOMATED: CPT

## 2024-07-22 PROCEDURE — 97530 THERAPEUTIC ACTIVITIES: CPT

## 2024-07-22 PROCEDURE — 99232 SBSQ HOSP IP/OBS MODERATE 35: CPT | Performed by: INTERNAL MEDICINE

## 2024-07-22 PROCEDURE — 1200000000 HC SEMI PRIVATE

## 2024-07-22 PROCEDURE — 97530 THERAPEUTIC ACTIVITIES: CPT | Performed by: PHYSICAL THERAPIST

## 2024-07-22 PROCEDURE — 36415 COLL VENOUS BLD VENIPUNCTURE: CPT

## 2024-07-22 PROCEDURE — 2700000000 HC OXYGEN THERAPY PER DAY

## 2024-07-22 PROCEDURE — 6360000002 HC RX W HCPCS: Performed by: INTERNAL MEDICINE

## 2024-07-22 PROCEDURE — 97165 OT EVAL LOW COMPLEX 30 MIN: CPT

## 2024-07-22 PROCEDURE — 6370000000 HC RX 637 (ALT 250 FOR IP)

## 2024-07-22 PROCEDURE — 80048 BASIC METABOLIC PNL TOTAL CA: CPT

## 2024-07-22 RX ADMIN — ACETAMINOPHEN 650 MG: 325 TABLET ORAL at 12:06

## 2024-07-22 RX ADMIN — ACETAMINOPHEN 650 MG: 325 TABLET ORAL at 01:24

## 2024-07-22 RX ADMIN — TIMOLOL MALEATE 1 DROP: 5 SOLUTION OPHTHALMIC at 20:45

## 2024-07-22 RX ADMIN — Medication 10 ML: at 20:43

## 2024-07-22 RX ADMIN — ONDANSETRON 4 MG: 2 INJECTION INTRAMUSCULAR; INTRAVENOUS at 16:51

## 2024-07-22 RX ADMIN — OXYCODONE 5 MG: 5 TABLET ORAL at 20:42

## 2024-07-22 RX ADMIN — Medication 10 ML: at 08:26

## 2024-07-22 RX ADMIN — PREDNISONE 5 MG: 5 TABLET ORAL at 08:25

## 2024-07-22 RX ADMIN — LISINOPRIL 5 MG: 5 TABLET ORAL at 20:42

## 2024-07-22 RX ADMIN — TRIAMCINOLONE ACETONIDE: 1 CREAM TOPICAL at 20:43

## 2024-07-22 RX ADMIN — OXYCODONE HYDROCHLORIDE AND ACETAMINOPHEN 500 MG: 500 TABLET ORAL at 08:25

## 2024-07-22 RX ADMIN — ENOXAPARIN SODIUM 40 MG: 100 INJECTION SUBCUTANEOUS at 08:24

## 2024-07-22 RX ADMIN — LISINOPRIL 5 MG: 5 TABLET ORAL at 08:25

## 2024-07-22 RX ADMIN — LEVOTHYROXINE SODIUM 25 MCG: 25 TABLET ORAL at 05:38

## 2024-07-22 RX ADMIN — TRIAMCINOLONE ACETONIDE: 1 CREAM TOPICAL at 08:24

## 2024-07-22 RX ADMIN — TIMOLOL MALEATE 1 DROP: 5 SOLUTION OPHTHALMIC at 08:24

## 2024-07-22 RX ADMIN — ASPIRIN 325 MG: 325 TABLET, COATED ORAL at 20:42

## 2024-07-22 RX ADMIN — CITALOPRAM HYDROBROMIDE 20 MG: 20 TABLET ORAL at 08:25

## 2024-07-22 RX ADMIN — ASPIRIN 325 MG: 325 TABLET, COATED ORAL at 08:25

## 2024-07-22 RX ADMIN — ACETAMINOPHEN 650 MG: 325 TABLET ORAL at 05:38

## 2024-07-22 NOTE — CARE COORDINATION
7/22/2024: SS NOTE:  [facility] for [facility]/OT evals to SUBMIT PRE CERT on Today, 7/22,  pt and nursing informed, sw will follow to confirm transfer arrangements, SCOOBY initiated.Electronically signed by STEPHEN Deutsch on 7/22/2024 at 12:03 PM

## 2024-07-23 LAB
BASOPHILS # BLD: 0.03 K/UL (ref 0–0.2)
BASOPHILS NFR BLD: 0 % (ref 0–2)
EOSINOPHIL # BLD: 0.28 K/UL (ref 0.05–0.5)
EOSINOPHILS RELATIVE PERCENT: 2 % (ref 0–6)
ERYTHROCYTE [DISTWIDTH] IN BLOOD BY AUTOMATED COUNT: 15.5 % (ref 11.5–15)
HCT VFR BLD AUTO: 26.5 % (ref 37–54)
HGB BLD-MCNC: 8.5 G/DL (ref 12.5–16.5)
IMM GRANULOCYTES # BLD AUTO: 0.08 K/UL (ref 0–0.58)
IMM GRANULOCYTES NFR BLD: 1 % (ref 0–5)
LYMPHOCYTES NFR BLD: 2.3 K/UL (ref 1.5–4)
LYMPHOCYTES RELATIVE PERCENT: 19 % (ref 20–42)
MCH RBC QN AUTO: 31.6 PG (ref 26–35)
MCHC RBC AUTO-ENTMCNC: 32.1 G/DL (ref 32–34.5)
MCV RBC AUTO: 98.5 FL (ref 80–99.9)
MONOCYTES NFR BLD: 0.72 K/UL (ref 0.1–0.95)
MONOCYTES NFR BLD: 6 % (ref 2–12)
NEUTROPHILS NFR BLD: 73 % (ref 43–80)
NEUTS SEG NFR BLD: 8.95 K/UL (ref 1.8–7.3)
PLATELET # BLD AUTO: 170 K/UL (ref 130–450)
PMV BLD AUTO: 11 FL (ref 7–12)
RBC # BLD AUTO: 2.69 M/UL (ref 3.8–5.8)
WBC OTHER # BLD: 12.4 K/UL (ref 4.5–11.5)

## 2024-07-23 PROCEDURE — 2580000003 HC RX 258: Performed by: INTERNAL MEDICINE

## 2024-07-23 PROCEDURE — 1200000000 HC SEMI PRIVATE

## 2024-07-23 PROCEDURE — 97110 THERAPEUTIC EXERCISES: CPT

## 2024-07-23 PROCEDURE — 36415 COLL VENOUS BLD VENIPUNCTURE: CPT

## 2024-07-23 PROCEDURE — 6370000000 HC RX 637 (ALT 250 FOR IP)

## 2024-07-23 PROCEDURE — 6360000002 HC RX W HCPCS: Performed by: INTERNAL MEDICINE

## 2024-07-23 PROCEDURE — 97530 THERAPEUTIC ACTIVITIES: CPT

## 2024-07-23 PROCEDURE — 94669 MECHANICAL CHEST WALL OSCILL: CPT

## 2024-07-23 PROCEDURE — 99239 HOSP IP/OBS DSCHRG MGMT >30: CPT | Performed by: STUDENT IN AN ORGANIZED HEALTH CARE EDUCATION/TRAINING PROGRAM

## 2024-07-23 PROCEDURE — 85025 COMPLETE CBC W/AUTO DIFF WBC: CPT

## 2024-07-23 PROCEDURE — 6370000000 HC RX 637 (ALT 250 FOR IP): Performed by: INTERNAL MEDICINE

## 2024-07-23 PROCEDURE — 6370000000 HC RX 637 (ALT 250 FOR IP): Performed by: STUDENT IN AN ORGANIZED HEALTH CARE EDUCATION/TRAINING PROGRAM

## 2024-07-23 RX ORDER — DOXYCYCLINE HYCLATE 100 MG/1
100 CAPSULE ORAL 2 TIMES DAILY
Status: DISCONTINUED | OUTPATIENT
Start: 2024-07-23 | End: 2024-07-24 | Stop reason: HOSPADM

## 2024-07-23 RX ADMIN — ASPIRIN 325 MG: 325 TABLET, COATED ORAL at 20:21

## 2024-07-23 RX ADMIN — LEVOTHYROXINE SODIUM 25 MCG: 25 TABLET ORAL at 06:28

## 2024-07-23 RX ADMIN — LISINOPRIL 5 MG: 5 TABLET ORAL at 10:32

## 2024-07-23 RX ADMIN — ACETAMINOPHEN 650 MG: 325 TABLET ORAL at 06:28

## 2024-07-23 RX ADMIN — Medication 10 ML: at 20:23

## 2024-07-23 RX ADMIN — TIMOLOL MALEATE 1 DROP: 5 SOLUTION OPHTHALMIC at 16:38

## 2024-07-23 RX ADMIN — TIMOLOL MALEATE 1 DROP: 5 SOLUTION OPHTHALMIC at 20:25

## 2024-07-23 RX ADMIN — DOXYCYCLINE HYCLATE 100 MG: 100 CAPSULE ORAL at 20:21

## 2024-07-23 RX ADMIN — PREDNISONE 5 MG: 5 TABLET ORAL at 10:32

## 2024-07-23 RX ADMIN — ASPIRIN 325 MG: 325 TABLET, COATED ORAL at 10:32

## 2024-07-23 RX ADMIN — ENOXAPARIN SODIUM 40 MG: 100 INJECTION SUBCUTANEOUS at 10:32

## 2024-07-23 RX ADMIN — LISINOPRIL 5 MG: 5 TABLET ORAL at 20:21

## 2024-07-23 RX ADMIN — ACETAMINOPHEN 650 MG: 325 TABLET ORAL at 20:21

## 2024-07-23 RX ADMIN — CITALOPRAM HYDROBROMIDE 20 MG: 20 TABLET ORAL at 10:32

## 2024-07-23 RX ADMIN — OXYCODONE HYDROCHLORIDE AND ACETAMINOPHEN 500 MG: 500 TABLET ORAL at 10:32

## 2024-07-23 RX ADMIN — DOXYCYCLINE HYCLATE 100 MG: 100 CAPSULE ORAL at 16:37

## 2024-07-23 NOTE — DISCHARGE SUMMARY
The patient was seen and examined on day of discharge and this discharge summary is in conjunction with any daily progress note from day of discharge.    Discharge plan:     Disposition: To a non-[facility] facility    Physician Follow Up:   Orthopedic surgery in 1 to 2 weeks.  [facility]  [address] [address] [phone]        LUCIO Cartagena DO  [address] [place] [address]  [phone]    Schedule an appointment as soon as possible for a visit in 2 week(s)  post op       Requiring Further Evaluation/Follow Up POST HOSPITALIZATION/Incidental Findings:     Diet: regular diet    Activity: As tolerated    Instructions to Patient:   Weightbearing as tolerated right lower extremity     Discharge Medications:      Medication List        START taking these medications      aspirin 325 MG tablet  Commonly known as: Jeffy Aspirin  Take 1 tablet by mouth in the morning and at bedtime  Replaces: aspirin 81 MG EC tablet     oxyCODONE-acetaminophen 5-325 MG per tablet  Commonly known as: Percocet  Take 1 tablet by mouth every 6 hours as needed for Pain for up to 7 days. Intended supply: 7 days. Take lowest dose possible to manage pain Max Daily Amount: 4 tablets            CHANGE how you take these medications      predniSONE 5 MG tablet  Commonly known as: DELTASONE  Take 1 tablet by mouth daily for 7 doses  What changed: Another medication with the same name was removed. Continue taking this medication, and follow the directions you see here.            CONTINUE taking these medications      Benzocaine-Menthol 6-10 MG Lozg lozenge  Commonly known as: CEPACOL  Take 1 lozenge by mouth every 2 hours as needed for Sore Throat     citalopram 20 MG tablet  Commonly known as: CELEXA  Take 1 tablet by mouth once daily     doxycycline hyclate 100 MG tablet  Commonly known as: VIBRA-TABS     Handicap Placard [facility]  Until 1/7/2025     levothyroxine 25 MCG tablet  Commonly

## 2024-07-23 NOTE — CARE COORDINATION
7/23/2024: SS NOTE:  Notified by Cheryl, admissions liaison for [facility] that they received PRE CERT and transfer confirmed today at 5:30pm via PAS, pt and pt's son, Dariusz notified of transfer arrangements and he will inform his sister, Bella, SCOOBY initiated. Electronically signed by STEPHEN Deutsch on 7/23/2024 at 1:36 PM

## 2024-07-23 NOTE — CARE COORDINATION
7/23/2024: SS NOTE:  Notified by Cheryl, admissions liaison for [facility] that they received PRE CERT for anticipated transfer tomorrow 7/24 per physician, Cheryl request a afternoon transfer, nursing informed, sw will follow to confirm transfer arrangements, SCOOBY initiated.Electronically signed by STEPHEN Deutsch on 7/23/2024 at 12:50 PM

## 2024-07-23 NOTE — PROGRESS NOTES
[facility] Hospitalist   Progress Note    Admitting Date and Time: 7/18/2024 10:52 AM  Admit Dx: Closed right hip fracture, initial encounter ([facility]) [S72.001A]  Closed 2-part intertrochanteric fracture of right femur, initial encounter ([facility]) [S72.141A]    Subjective:    Patient was admitted with Closed right hip fracture, initial encounter ([facility]) [S72.001A]  Closed 2-part intertrochanteric fracture of right femur, initial encounter ([facility]) [S72.141A]. Patient denies fever, chills, cp, sob, n/v.     lisinopril  5 mg Oral BID    acetaminophen  650 mg Oral Q6H    aspirin  325 mg Oral BID    sodium chloride flush  5-40 mL IntraVENous 2 times per day    enoxaparin  40 mg SubCUTAneous Daily    citalopram  20 mg Oral Daily    levothyroxine  25 mcg Oral Daily    timolol  1 drop Left Eye BID    triamcinolone   Topical BID    vitamin C  500 mg Oral Daily    predniSONE  5 mg Oral Daily     perflutren lipid microspheres, 1.5 mL, ONCE PRN  oxyCODONE, 5 mg, Q4H PRN  morphine, 2 mg, Q3H PRN   Or  morphine, 4 mg, Q3H PRN  sodium chloride flush, 5-40 mL, PRN  sodium chloride, , PRN  potassium chloride, 40 mEq, PRN   Or  potassium alternative oral replacement, 40 mEq, PRN   Or  potassium chloride, 10 mEq, PRN  magnesium sulfate, 2,000 mg, PRN  ondansetron, 4 mg, Q8H PRN   Or  ondansetron, 4 mg, Q6H PRN  acetaminophen, 650 mg, Q6H PRN   Or  acetaminophen, 650 mg, Q6H PRN  Benzocaine-Menthol, 1 lozenge, Q2H PRN  polyethylene glycol, 17 g, Daily PRN         Objective:    BP (!) 140/80   Pulse 98   Temp 97.8 °F (36.6 °C) (Infrared)   Resp 18   Ht 1.73 m (5' 8.11\")   Wt 64 kg (141 lb 1.5 oz)   SpO2 96%   BMI 21.38 kg/m²   Skin: warm and dry, no rash or erythema  Pulmonary/Chest: clear to auscultation bilaterally- no wheezes, rales or rhonchi, normal air movement, no respiratory distress  Cardiovascular: rhythm reg at rate of 96  Abdomen: soft, non-tender, non-distended, normal bowel sounds, no masses or 
 Physical Therapy Treatment Note/Plan of Care    Room #:  0321/0321-01  Patient Name: Dariusz Schreiber  YOB: 1935  MRN: [id number]    Date of Service: 7/23/2024     Tentative placement recommendation: Subacute Rehab  Equipment recommendation: Patient has needed equipment       Evaluating Physical Therapist: Dariusz Brown PT Lic.  #[id number]      Specific Provider Orders/Date/Referring Provider :  07/19/24 1845    PT eval and treat  Start:  07/19/24 1845,   End:  07/19/24 1845,   ONE TIME,   Standing Count:  1 Occurrences,   R       Reinier Huber DO    Admitting Diagnosis:   Closed right hip fracture, initial encounter ([facility]) [S72.001A]  Closed 2-part intertrochanteric fracture of right femur, initial encounter ([facility]) [S72.141A]       Surgery:   ORIF right femur  Visit Diagnoses         Codes    Cardiomyopathy, unspecified type ([facility])     I42.9            Patient Active Problem List   Diagnosis    Right inguinal hernia    Dizziness    Essential hypertension    Gastroesophageal reflux disease without esophagitis    Hypothyroidism    Spinal stenosis    Hyperlipidemia    Bullous pemphigoid    Depression    Lung nodule    Urticaria    Lumbar facet arthropathy    Lumbar disc disorder    Lumbar spondylosis    Spinal stenosis of lumbar region without neurogenic claudication    Lumbar radiculopathy    Lumbar postlaminectomy syndrome    Generalized weakness    Closed right hip fracture, initial encounter ([facility])    Moderate protein-calorie malnutrition (HCC)    Closed comminuted intertrochanteric fracture of proximal end of right femur, initial encounter ([facility])    Anemia associated with acute blood loss    Atelectasis    Primary hypertension    Closed comminuted intertrochanteric fracture of proximal end of right femur ([facility])        ASSESSMENT of Current Deficits Patient exhibits decreased strength, balance, and endurance impairing functional mobility, transfers, gait , gait distance, and tolerance to activity . These are 
 Physical Therapy Treatment Note/Plan of Care    Room #:  0321/0321-01  Patient Name: Dariusz Schreiber  YOB: 1935  MRN: [id number]    Date of Service: 7/21/2024     Tentative placement recommendation: Subacute Rehab  Equipment recommendation: Patient has needed equipment       Evaluating Physical Therapist: Dariusz Brown PT Lic.  #[id number]      Specific Provider Orders/Date/Referring Provider :  07/19/24 1845    PT eval and treat  Start:  07/19/24 1845,   End:  07/19/24 1845,   ONE TIME,   Standing Count:  1 Occurrences,   R       Reinier Huber DO    Admitting Diagnosis:   Closed right hip fracture, initial encounter ([facility]) [S72.001A]  Closed 2-part intertrochanteric fracture of right femur, initial encounter ([facility]) [S72.141A]       Surgery:   ORIF right femur  Visit Diagnoses         Codes    Cardiomyopathy, unspecified type ([facility])     I42.9            Patient Active Problem List   Diagnosis    Right inguinal hernia    Dizziness    Essential hypertension    Gastroesophageal reflux disease without esophagitis    Hypothyroidism    Spinal stenosis    Hyperlipidemia    Bullous pemphigoid    Depression    Lung nodule    Urticaria    Lumbar facet arthropathy    Lumbar disc disorder    Lumbar spondylosis    Spinal stenosis of lumbar region without neurogenic claudication    Lumbar radiculopathy    Lumbar postlaminectomy syndrome    Generalized weakness    Closed right hip fracture, initial encounter ([facility])    Moderate protein-calorie malnutrition (HCC)    Closed comminuted intertrochanteric fracture of proximal end of right femur, initial encounter ([facility])        ASSESSMENT of Current Deficits Patient exhibits decreased strength, balance, and endurance impairing functional mobility, transfers, gait , gait distance, and tolerance to activity . These are barriers to d/c and require skilled intervention to address concerns listed above to increase safety and independence at discharge.   Decreased strength, balance and 
 Physical Therapy Treatment Note/Plan of Care    Room #:  0321/0321-01  Patient Name: Dariusz Schreiber  YOB: 1935  MRN: [id number]    Date of Service: 7/22/2024     Tentative placement recommendation: Subacute Rehab  Equipment recommendation: Patient has needed equipment       Evaluating Physical Therapist: Dariusz Brown PT Lic.  #[id number]      Specific Provider Orders/Date/Referring Provider :  07/19/24 1845    PT eval and treat  Start:  07/19/24 1845,   End:  07/19/24 1845,   ONE TIME,   Standing Count:  1 Occurrences,   R       Reinier Huber DO    Admitting Diagnosis:   Closed right hip fracture, initial encounter ([facility]) [S72.001A]  Closed 2-part intertrochanteric fracture of right femur, initial encounter ([facility]) [S72.141A]       Surgery:   ORIF right femur  Visit Diagnoses         Codes    Cardiomyopathy, unspecified type ([facility])     I42.9            Patient Active Problem List   Diagnosis    Right inguinal hernia    Dizziness    Essential hypertension    Gastroesophageal reflux disease without esophagitis    Hypothyroidism    Spinal stenosis    Hyperlipidemia    Bullous pemphigoid    Depression    Lung nodule    Urticaria    Lumbar facet arthropathy    Lumbar disc disorder    Lumbar spondylosis    Spinal stenosis of lumbar region without neurogenic claudication    Lumbar radiculopathy    Lumbar postlaminectomy syndrome    Generalized weakness    Closed right hip fracture, initial encounter ([facility])    Moderate protein-calorie malnutrition (HCC)    Closed comminuted intertrochanteric fracture of proximal end of right femur, initial encounter ([facility])        ASSESSMENT of Current Deficits Patient exhibits decreased strength, balance, and endurance impairing functional mobility, transfers, gait , gait distance, and tolerance to activity . These are barriers to d/c and require skilled intervention to address concerns listed above to increase safety and independence at discharge.   Decreased strength, balance and 
Admitting Date and Time: 7/18/2024 10:52 AM  Admit Dx: Closed right hip fracture, initial encounter ([facility]) [S72.001A]  Closed 2-part intertrochanteric fracture of right femur, initial encounter ([facility]) [S72.141A]    Subjective:    Pt feels ok no complaints  Per RN:bp ok now    ROS: denies fever, chills, cp, sob, n/v, HA unless stated above.     sodium chloride flush  5-40 mL IntraVENous 2 times per day    ceFAZolin (ANCEF) IVPB  2,000 mg IntraVENous Q8H    acetaminophen  650 mg Oral Q6H    aspirin  325 mg Oral BID    sodium chloride flush  5-40 mL IntraVENous 2 times per day    enoxaparin  40 mg SubCUTAneous Daily    midodrine  10 mg Oral TID WC    citalopram  20 mg Oral Daily    levothyroxine  25 mcg Oral Daily    timolol  1 drop Left Eye BID    triamcinolone   Topical BID    vitamin C  500 mg Oral Daily    predniSONE  10 mg Oral Daily    Followed by    [START ON 7/22/2024] predniSONE  5 mg Oral Daily     sodium chloride, , PRN  perflutren lipid microspheres, 1.5 mL, ONCE PRN  sodium chloride flush, 5-40 mL, PRN  sodium chloride, , PRN  oxyCODONE, 5 mg, Q4H PRN  morphine, 2 mg, Q3H PRN   Or  morphine, 4 mg, Q3H PRN  sodium chloride, , PRN  sodium chloride flush, 5-40 mL, PRN  sodium chloride, , PRN  potassium chloride, 40 mEq, PRN   Or  potassium alternative oral replacement, 40 mEq, PRN   Or  potassium chloride, 10 mEq, PRN  magnesium sulfate, 2,000 mg, PRN  ondansetron, 4 mg, Q8H PRN   Or  ondansetron, 4 mg, Q6H PRN  acetaminophen, 650 mg, Q6H PRN   Or  acetaminophen, 650 mg, Q6H PRN  Benzocaine-Menthol, 1 lozenge, Q2H PRN  polyethylene glycol, 17 g, Daily PRN         Objective:    /64   Pulse 66   Temp 98.8 °F (37.1 °C) (Temporal)   Resp 18   Ht 1.753 m (5' 9.02\")   SpO2 97%   BMI 20.78 kg/m²   General Appearance: alert and oriented to person, place and time and in no acute distress  Skin: warm and dry  Head: normocephalic and atraumatic  Eyes: pupils equal, round, and reactive to light, extraocular eye 
Admitting Date and Time: 7/18/2024 10:52 AM  Admit Dx: Closed right hip fracture, initial encounter ([facility]) [S72.001A]  Closed 2-part intertrochanteric fracture of right femur, initial encounter ([facility]) [S72.141A]    Subjective:    Pt feels ok no complaints  Per RN: ptt added to orders last night    ROS: denies fever, chills, cp, sob, n/v, HA unless stated above.     sodium chloride flush  5-40 mL IntraVENous 2 times per day    enoxaparin  40 mg SubCUTAneous Daily    midodrine  10 mg Oral TID WC    aspirin  81 mg Oral Daily    citalopram  20 mg Oral Daily    levothyroxine  25 mcg Oral Daily    timolol  1 drop Left Eye BID    triamcinolone   Topical BID    vitamin C  500 mg Oral Daily    predniSONE  10 mg Oral Daily    Followed by    [START ON 7/22/2024] predniSONE  5 mg Oral Daily     sodium chloride flush, 5-40 mL, PRN  sodium chloride, , PRN  potassium chloride, 40 mEq, PRN   Or  potassium alternative oral replacement, 40 mEq, PRN   Or  potassium chloride, 10 mEq, PRN  magnesium sulfate, 2,000 mg, PRN  ondansetron, 4 mg, Q8H PRN   Or  ondansetron, 4 mg, Q6H PRN  acetaminophen, 650 mg, Q6H PRN   Or  acetaminophen, 650 mg, Q6H PRN  Benzocaine-Menthol, 1 lozenge, Q2H PRN  polyethylene glycol, 17 g, Daily PRN         Objective:    BP (!) 109/59   Pulse 83   Temp 98.3 °F (36.8 °C) (Oral)   Resp 14   Ht 1.753 m (5' 9\")   SpO2 96%   BMI 20.79 kg/m²   General Appearance: alert and oriented to person, place and time and in no acute distress  Skin: warm and dry  Head: normocephalic and atraumatic  Eyes: pupils equal, round, and reactive to light, extraocular eye movements intact, conjunctivae normal  Neck: neck supple and non tender without mass   Pulmonary/Chest: clear to auscultation bilaterally- no wheezes, rales or rhonchi, normal air movement, no respiratory distress  Cardiovascular: normal rate, normal S1 and S2 and no carotid bruits  Abdomen: soft, non-tender, non-distended, normal bowel sounds, no masses or 
Comprehensive Nutrition Assessment    Type and Reason for Visit:  Initial, Positive Nutrition Screen (MST 3)    Nutrition Recommendations/Plan:   Continue NPO  Begin Tee BID once diet advances  Begin ONS (high calorie/high protein) TID once diet advances  Consult RD as needed      Malnutrition Assessment:  Malnutrition Status:  Moderate malnutrition (07/19/24 1211)    Context:  Chronic Illness     Findings of the 6 clinical characteristics of malnutrition:  Energy Intake:  Mild decrease in energy intake (Comment)  Weight Loss:  Mild weight loss (specify amount and time period) (6% in 4-5 months)     Body Fat Loss:   (moderate) Triceps, Buccal region   Muscle Mass Loss:   (moderate) Temples (temporalis), Clavicles (pectoralis & deltoids), Hand (interosseous)  Fluid Accumulation:  No significant fluid accumulation     Strength:  Not Performed    Nutrition Assessment:    Pt admit w/ right hip fracture, generalized weakness, recurrent falls, wounds. Pt discharged one week ago for falls. PMHx: COPD, HTN/HLD, Neuropathy, PUD, Thyroid Disease. Pt meets criteria for moderate malnutrition. Pt is NPO. Will provide ONS TID, Tee BID once diet advances, continue inpatient monitoring, f/up per policy.    Nutrition Related Findings:    A/O x4, I/O +190 since admit, WBC 12.1, RBC 2.56, Hgb 8.2, Hct 26.1 Wound Type: Multiple (Right Proximal arm, right anterior knee, Pre-tibial left, Left and right buttocks, right hip)       Current Nutrition Intake & Therapies:    Average Meal Intake: NPO  Average Supplements Intake: None Ordered  Diet NPO Exceptions are: Sips of Water with Meds    Anthropometric Measures:  Height: 175.3 cm (5' 9.02\")  Ideal Body Weight (IBW): 160 lbs (73 kg)    Admission Body Weight: 63.9 kg (140 lb 14 oz)  Current Body Weight: 63.9 kg (140 lb 14 oz), 88 % IBW. Weight Source: Bed Scale (07/10/24)  Current BMI (kg/m2): 20.8  Usual Body Weight: 67.1 kg (147 lb 14.9 oz) (01/04/23)  % Weight Change 
Department of Orthopedic Surgery   Progress Note    Patient seen and examined. Pain controlled.  Doing well this morning.  Diet is tolerated.  Has not yet participated with therapy.  No new complaints.  Denies chest pain, shortness of breath, calf pain, dizziness/lightheadedness, fevers or chills.     VITALS:  /64   Pulse 66   Temp 98.8 °F (37.1 °C) (Temporal)   Resp 18   Ht 1.753 m (5' 9.02\")   SpO2 97%   BMI 20.78 kg/m²     GENERAL: In bed, comfortable, oriented x 3  MUSCULOSKELETAL:   right lower extremity:  Dressing C/D/I  Compartments soft and compressible, calf non-tender  Palpable dorsalis pedis and posterior tibialis pulse, brisk cap refill to toes, foot warm and perfused  Sensation intact to light touch in sural/deep peroneal/superficial peroneal/saphenous/posterior tibial nerve distributions to foot/ankle.  Demonstrates active ankle plantar/dorsiflexion/great toe extension    CBC:   Lab Results   Component Value Date/Time    WBC 10.6 07/20/2024 04:28 AM    HGB 6.4 07/20/2024 04:28 AM    HCT 19.2 07/20/2024 04:28 AM     07/20/2024 04:28 AM       ASSESSMENT  S/p right hip CMN 7/19    PLAN    Weightbearing as tolerated right lower extremity  Postop Ancef for surgical prophylaxis completed today  DVT prophylaxis, currently on Lovenox 40 mg daily.  Okay to restart today 7/20.  Recommend transitioning to aspirin 325 mg twice daily upon discharge and outpatient basis.  Continue multimodal pain control IV/p.o. per admitting team  PT/OT as amenable  H&H, 6.4 this morning.  Transfusion protocol in place  DC pending working reconciliations.    Electronically signed by Bandar Cortez DO  on 7/20/2024 at 7:37 AM   
Department of Orthopedic Surgery   Progress Note    Patient seen and examined. Pain controlled.  Remains on 2 L oxygen supplementation.  Found participating with therapy.  Diet is tolerated.    No new complaints.  Denies chest pain, shortness of breath, calf pain, dizziness/lightheadedness, fevers or chills.     VITALS:  BP (!) 184/104   Pulse 83   Temp 97.7 °F (36.5 °C) (Oral)   Resp 18   Ht 1.753 m (5' 9.02\")   SpO2 99%   BMI 20.78 kg/m²     GENERAL: In bed, comfortable, oriented x 3  MUSCULOSKELETAL:   right lower extremity:  Dressing C/D/I  Compartments soft and compressible, calf non-tender  Palpable dorsalis pedis and posterior tibialis pulse, brisk cap refill to toes, foot warm and perfused  Sensation intact to light touch in sural/deep peroneal/superficial peroneal/saphenous/posterior tibial nerve distributions to foot/ankle.  Demonstrates active ankle plantar/dorsiflexion/great toe extension    CBC:   Lab Results   Component Value Date/Time    WBC 10.4 07/21/2024 04:17 AM    HGB 8.8 07/21/2024 04:17 AM    HCT 27.2 07/21/2024 04:17 AM     07/21/2024 04:17 AM       ASSESSMENT  S/p right hip CMN 7/19    PLAN    Weightbearing as tolerated right lower extremity  Postop Ancef for surgical prophylaxis completed today  DVT prophylaxis, currently on Lovenox 40 mg daily.  Okay to restart today 7/20.  Recommend transitioning to aspirin 325 mg twice daily upon discharge and outpatient basis.  Continue multimodal pain control IV/p.o. per admitting team  PT/OT as amenable  H&H, 8.8.  Continue to monitor  DC pending working reconciliations.  No other orthopedic intervention planned at this time.  Patient may discharge from orthopedic standpoint when medically stable.  Orthopedic will continue to monitor peripherally during hospital stay.    Electronically signed by Bandar Cortez DO  on 7/21/2024 at 9:12 AM   
Family at bedside wanted to relay some info and concerns:    Pt has been unable to use his left arm and they were concerned he has a torn rotator cuff.    Pt was diagnosed at a dermatologist with Bullous pemphigoid and applies kenalog cream to his open sores.    Pt has had urinary urgency and frequency and they were concerned of bladder and prostate issues. Pt just passed a blood clot via urethra.  
OCCUPATIONAL THERAPY INITIAL EVALUATION    [facility]  [address]  [place]. [place]        Date:2024                                                  Patient Name: Dariusz Schreiber    MRN: [id number]    : 1935    Room: [address] [place] [address]      Evaluating OT: Dorothy Beer OTR/L #[id number]     Referring Provider and Specific Provider Orders/Date:      24  OT eval and treat  Start:  24,   End:  24,   ONE TIME,   Standing Count:  1 Occurrences,   R         Reinier Huber, DO      Placement Recommendation: Subacute Rehab        Diagnosis:   1. Closed 2-part intertrochanteric fracture of right femur, initial encounter ([facility])    2. Closed right hip fracture, initial encounter ([facility])    3. Cardiomyopathy, unspecified type ([facility])         Surgery: S/p right hip CMN 24     Pertinent Medical History:       Past Medical History:   Diagnosis Date    Arthritis     Asthma     COPD (chronic obstructive pulmonary disease) ([facility])     Depression     Hiatal hernia     Hx of solitary pulmonary nodule     Hyperglycemia     Hyperlipidemia     Hypertension     Macular degeneration     Neuropathy     Osteoarthritis     Peptic ulcer     PONV (postoperative nausea and vomiting)     Thyroid disease          Past Surgical History:   Procedure Laterality Date    BACK SURGERY          COLONOSCOPY      ENDOSCOPY, COLON, DIAGNOSTIC      EYE SURGERY      viviane cataracts    HERNIA REPAIR      x2  ,     HERNIA REPAIR Right 3/9/2020    LAPAROSCOPIC HERNIA RIGHT  INGUINAL REPAIR WITH MESH ROBOTIC XI performed by Jose M Gr MD at [facility] OR    SINUS SURGERY        Precautions:  Fall Risk, falls and alarm, WBAT R LE, O2, cognition      Assessment of current deficits    [x] Functional mobility  [x]ADLs  [x] Strength               [x]Cognition    [x] Functional transfers   [x] IADLs         [x] Safety Awareness   [x]Endurance    [] Fine Coordination             
OCCUPATIONAL THERAPY TREATMENT NOTE    [facility]   [address]        Date:2024  Patient Name: Dariusz Schreiber  MRN: [id number]  : 1935  Room: [address] [place] [address]     Evaluating OT: Dorothy Cheung OTR/L #[id number]      Referring Provider and Specific Provider Orders/Date:      24   OT eval and treat  Start:  24,   End:  24,   ONE TIME,   Standing Count:  1 Occurrences,   R         Reinier Huber, DO       Placement Recommendation: Subacute Rehab         Diagnosis:   1. Closed 2-part intertrochanteric fracture of right femur, initial encounter ([facility])    2. Closed right hip fracture, initial encounter ([facility])    3. Cardiomyopathy, unspecified type ([facility])         Surgery: S/p right hip CMN 24      Pertinent Medical History:       Past Medical History        Past Medical History:   Diagnosis Date    Arthritis      Asthma      COPD (chronic obstructive pulmonary disease) ([facility])      Depression      Hiatal hernia      Hx of solitary pulmonary nodule      Hyperglycemia      Hyperlipidemia      Hypertension      Macular degeneration      Neuropathy      Osteoarthritis      Peptic ulcer      PONV (postoperative nausea and vomiting)      Thyroid disease              Past Surgical History         Past Surgical History:   Procedure Laterality Date    BACK SURGERY             COLONOSCOPY        ENDOSCOPY, COLON, DIAGNOSTIC        EYE SURGERY         viviane cataracts    HERNIA REPAIR         x2  ,     HERNIA REPAIR Right 3/9/2020     LAPAROSCOPIC HERNIA RIGHT  INGUINAL REPAIR WITH MESH ROBOTIC XI performed by Jose M Gr MD at [facility] OR    SINUS SURGERY              Precautions:  Fall Risk, falls and alarm, WBAT R LE, O2, cognition       Assessment of current deficits    [x] Functional mobility            [x]ADLs           [x] Strength                   [x]Cognition    [x] Functional transfers          [x] IADLs        
Received verbal order that patient is cleared for OR from Dr Rabago who will be placing his note shortly.  Ortho notified of clearance.  
endurance  increases patient's risk for fall.      Strengthening and endurance training will improve the patients safety in the NH facility and the safety of the staff who provide care.         PHYSICAL THERAPY  PLAN OF CARE       Physical therapy plan of care is established based on physician order,  patient diagnosis and clinical assessment    Current Treatment Recommendations:    -Bed Mobility: Lower and upper extremity exercises, and trunk control activities  -Standing Balance: Perform strengthening exercises in standing to promote motor control with or without upper extremity support   -Transfers: Provide instruction on proper hand and foot position for adequate transfer of weight onto lower extremities and use of gait device if needed and Cues for hand placement, technique and safety. Provide stabilization to prevent fall   -Gait: Gait training and Standing activities to improve: base of support, weight shift, weight bearing      PT long term treatment goals are located in below grid    Patient and or family understand(s) diagnosis, prognosis, and plan of care.    Frequency of treatments: Patient will be seen  twice daily.         Prior Level of Function: Patient ambulated with wheeled walker    Rehab Potential: good   for baseline    Past medical history:   Past Medical History:   Diagnosis Date    Arthritis     Asthma     COPD (chronic obstructive pulmonary disease) (HCC)     Depression     Hiatal hernia     Hx of solitary pulmonary nodule     Hyperglycemia     Hyperlipidemia     Hypertension     Macular degeneration     Neuropathy     Osteoarthritis     Peptic ulcer     PONV (postoperative nausea and vomiting)     Thyroid disease      Past Surgical History:   Procedure Laterality Date    BACK SURGERY      1989    COLONOSCOPY      ENDOSCOPY, COLON, DIAGNOSTIC      EYE SURGERY      viviane cataracts    HERNIA REPAIR      x2  1955, 1973    HERNIA REPAIR Right 3/9/2020    LAPAROSCOPIC HERNIA RIGHT  INGUINAL REPAIR 
Daughter and daughter-in-law at bedside help with history son is POA is in route    Hip fx ortho consult. ORIF for comminuted intro trochanteric fracture of the right proximal femur  Altectasis: chest pt. flutter valve however with his dementia he is not likely to remember to perform this thus I spoke to nursing staff.  Chest x-ray on 7/19: No acute process  Procalcitonin elevated at 0.26 on 7/19.  As of a.m. of 7/21 not tachycardic afebrile.  Very slight leukocytosis has resolved.  Patient extremely surprised that I told him to use incentive spirometry every 10 minutes.  It had the desired effect because 5 minutes after I left the room and I was in the hallway I heard that he was using it.  I spoke to the nurse to remind him to do it as well and if that that would be more poor maneuver than weaning his oxygen for today    Acute blood loss anemia combined with dilutional effect resulting in low hemoglobin post surgery.  Patient has now received transfusions and we will continue to monitor posttransfusion hemoglobin and transfuse accordingly to keep hemoglobin above 7. D/w ortho resident    EKG rbb pvcs  hypertension early a.m. 7/21 systolic .  I am asking nurse to document recheck of this blood pressure since there has been no follow-up documented in the last 5 hours I will restart lisinopril AT 5 instead of 10 mg bid with parameters.  I will stop midodrine dose on 7/22   No change to outpatient treatment regimen for the existing stable combordities including:   COPD arthritis depression dementia pulmonary nodule hyperlipidemia macular degeneration neuropathy osteoarthritis peptic ulcer thyroid disease  DNR CCA  DVT prophylaxis: Lovenox  Dispo back to facility    NOTE: This report was transcribed using voice recognition software. Every effort was made to ensure accuracy; however, inadvertent computerized transcription errors may be present.     Electronically signed by WEN STYLES MD on 7/21/2024 at 8:12

## 2024-07-23 NOTE — CARE COORDINATION
7/23/2024: SS NOTE:  Sw contacted by pt's daughter, Nannette Sosa who relayed that she does NOT want her father to return to [facility], she request placement at [facility] and has already spoke with Hedy at the facility, referral made to [facility] liaison who confirmed they do have a rehab bed available and will SUBMIT PRE CERT for possible transfer later today or tomorrow, she will call sw or nurses station if they receive late authorization to confirm transfer, Cheryl liaison for [facility] informed, request their facility rescind their pre cert request and send the HENS/PASRR they have on pt to [facility], [facility] on a \"will call\", Nursing informed. Electronically signed by STEPHEN Deutsch on 7/23/2024 at 3:16 PM

## 2024-07-23 NOTE — CARE COORDINATION
7/23/2024: SS NOTE:  [facility] admissions liaison is following for PT/OT evals to SUBMIT PRE CERT oN 7/22,  pt and nursing informed, sw will follow to confirm transfer arrangements, SCOOBY initiated.Electronically signed by STEPHEN Deutsch on 7/23/2024 at 10:40 AM

## 2024-07-24 VITALS
WEIGHT: 141.09 LBS | SYSTOLIC BLOOD PRESSURE: 153 MMHG | OXYGEN SATURATION: 99 % | TEMPERATURE: 97.3 F | HEIGHT: 68 IN | DIASTOLIC BLOOD PRESSURE: 84 MMHG | RESPIRATION RATE: 16 BRPM | BODY MASS INDEX: 21.38 KG/M2 | HEART RATE: 85 BPM

## 2024-07-24 PROCEDURE — 6370000000 HC RX 637 (ALT 250 FOR IP)

## 2024-07-24 PROCEDURE — 6370000000 HC RX 637 (ALT 250 FOR IP): Performed by: STUDENT IN AN ORGANIZED HEALTH CARE EDUCATION/TRAINING PROGRAM

## 2024-07-24 PROCEDURE — 6370000000 HC RX 637 (ALT 250 FOR IP): Performed by: INTERNAL MEDICINE

## 2024-07-24 PROCEDURE — 99239 HOSP IP/OBS DSCHRG MGMT >30: CPT | Performed by: STUDENT IN AN ORGANIZED HEALTH CARE EDUCATION/TRAINING PROGRAM

## 2024-07-24 PROCEDURE — 6360000002 HC RX W HCPCS: Performed by: INTERNAL MEDICINE

## 2024-07-24 RX ADMIN — ASPIRIN 325 MG: 325 TABLET, COATED ORAL at 09:30

## 2024-07-24 RX ADMIN — LISINOPRIL 5 MG: 5 TABLET ORAL at 09:30

## 2024-07-24 RX ADMIN — PREDNISONE 5 MG: 5 TABLET ORAL at 09:30

## 2024-07-24 RX ADMIN — ENOXAPARIN SODIUM 40 MG: 100 INJECTION SUBCUTANEOUS at 09:30

## 2024-07-24 RX ADMIN — DOXYCYCLINE HYCLATE 100 MG: 100 CAPSULE ORAL at 09:30

## 2024-07-24 RX ADMIN — ACETAMINOPHEN 650 MG: 325 TABLET ORAL at 01:36

## 2024-07-24 RX ADMIN — ACETAMINOPHEN 650 MG: 325 TABLET ORAL at 05:28

## 2024-07-24 RX ADMIN — LEVOTHYROXINE SODIUM 25 MCG: 25 TABLET ORAL at 05:28

## 2024-07-24 RX ADMIN — CITALOPRAM HYDROBROMIDE 20 MG: 20 TABLET ORAL at 09:30

## 2024-07-24 RX ADMIN — OXYCODONE HYDROCHLORIDE AND ACETAMINOPHEN 500 MG: 500 TABLET ORAL at 09:30

## 2024-07-24 NOTE — OP NOTE
Operative Note      Patient: Dariusz Schreiber  YOB: 1935  MRN: [id number]    Date of Procedure: 7/19/2024    Pre-Op Diagnosis Codes:     * Closed fracture of right hip, initial encounter ([facility]) [S72.001A]    Post-Op Diagnosis:  Closed comminuted intertrochanteric fracture right proximal femur       Procedure(s):  RIGHT HIP OPEN REDUCTION INTERNAL FIXATION    Surgeon(s):  LUCIO Cartagena DO    Assistant:   First Assistant: Francisco Geiger; Robina Bates  Resident: Luciano Russ DO; Reinier Huber DO    Anesthesia: General    Estimated Blood Loss (mL): 200 cc    Complications: None    Specimens:   * No specimens in log *    Implants:  Implant Name Type Inv. Item Serial No.  Lot No. LRB No. Used Action   NAIL IM L400MM [id number] 130DEG LNG R PROX FEM GRN TI TAYLOR - [id number]  NAIL IM L400MM [id number] 130DEG LNG R PROX FEM GRN TI TAYLOR  DEPUY [employer]-WD [id number] Right 1 Implanted   SCREW BNE L110MM DIA10.35MM G TI TAYLOR PERF FOR PROX FEM - [id number]  SCREW BNE L110MM DIA10.35MM G TI TAYLOR PERF FOR PROX FEM  DEPUY [employer] [place]- [id number] Right 1 Implanted   SCREW BNE L48MM DIA5MM LAT FEM LT GRN TI ST JAKE FULL THRD - [id number]  SCREW BNE L48MM DIA5MM LAT FEM LT GRN TI ST JAKE FULL THRD  DEPUY [employer] [place]-WD [id number] Right 1 Implanted         Drains:   External Urinary Catheter (Active)   Site Assessment Clean,dry & intact;Pink 07/21/24 1928   Placement Replaced 07/22/24 1303   Catheter Care Catheter/Wick replaced 07/22/24 1303   Perineal Care Yes 07/23/24 1403   Suction 40 mmgHg continuous 07/22/24 1303   Output (mL) 350 mL 07/22/24 1708       Findings:  Infection Present At Time Of Surgery (PATOS) (choose all levels that have infection present):  No infection present  Other Findings: Closed comminuted displaced intertrochanteric fracture right proximal femur    Detailed Description of Procedure:   The patient was brought the operating room after site side were notified.  Adequate general

## 2024-07-24 NOTE — PLAN OF CARE
Problem: Discharge Planning  Goal: Discharge to home or other facility with appropriate resources  7/19/2024 0740 by Ale Cartagena, RN  Outcome: Progressing  7/19/2024 0300 by Jose Cherry, RN  Outcome: Progressing  7/18/2024 1838 by Xavi Harrison, RN  Outcome: Progressing     
  Problem: Discharge Planning  Goal: Discharge to home or other facility with appropriate resources  7/19/2024 1941 by Jose Cherry RN  Outcome: Progressing  7/19/2024 0740 by Ale Cartagena RN  Outcome: Progressing     Problem: Pain  Goal: Verbalizes/displays adequate comfort level or baseline comfort level  7/19/2024 1941 by Jose Cherry RN  Outcome: Progressing  7/19/2024 0740 by Ale Cartagena RN  Outcome: Progressing     Problem: Skin/Tissue Integrity  Goal: Absence of new skin breakdown  Description: 1.  Monitor for areas of redness and/or skin breakdown  2.  Assess vascular access sites hourly  3.  Every 4-6 hours minimum:  Change oxygen saturation probe site  4.  Every 4-6 hours:  If on nasal continuous positive airway pressure, respiratory therapy assess nares and determine need for appliance change or resting period.  7/19/2024 1941 by Jose Cherry RN  Outcome: Progressing  7/19/2024 0740 by Ale Cartagena RN  Outcome: Progressing     Problem: Safety - Adult  Goal: Free from fall injury  7/19/2024 1941 by Jose Cherry RN  Outcome: Progressing  7/19/2024 0740 by Ale Cartagena RN  Outcome: Progressing     Problem: ABCDS Injury Assessment  Goal: Absence of physical injury  7/19/2024 1941 by Jose Cherry RN  Outcome: Progressing  7/19/2024 0740 by Ale Cartagena RN  Outcome: Progressing     Problem: Musculoskeletal - Adult  Goal: Return mobility to safest level of function  7/19/2024 1941 by Jose Cherry RN  Outcome: Progressing  7/19/2024 0740 by Ale Cartagena RN  Outcome: Progressing  Goal: Return ADL status to a safe level of function  7/19/2024 1941 by Jose Cherry RN  Outcome: Progressing  7/19/2024 0740 by Ale Cartagena RN  Outcome: Progressing     Problem: Metabolic/Fluid and Electrolytes - Adult  Goal: Hemodynamic stability and optimal renal function maintained  7/19/2024 1941 by Jose Cherry RN  Outcome: Progressing  7/19/2024 0740 by 
  Problem: Discharge Planning  Goal: Discharge to home or other facility with appropriate resources  7/19/2024 1941 by Jose Cherry RN  Outcome: Progressing  7/19/2024 0740 by Ale Cartagena RN  Outcome: Progressing     Problem: Pain  Goal: Verbalizes/displays adequate comfort level or baseline comfort level  7/19/2024 1941 by Jose Cherry RN  Outcome: Progressing  7/19/2024 0740 by Ale Cartagena RN  Outcome: Progressing     Problem: Skin/Tissue Integrity  Goal: Absence of new skin breakdown  Description: 1.  Monitor for areas of redness and/or skin breakdown  2.  Assess vascular access sites hourly  3.  Every 4-6 hours minimum:  Change oxygen saturation probe site  4.  Every 4-6 hours:  If on nasal continuous positive airway pressure, respiratory therapy assess nares and determine need for appliance change or resting period.  7/19/2024 1941 by Jose Cherry RN  Outcome: Progressing  7/19/2024 0740 by Ale Cartagena RN  Outcome: Progressing     Problem: Safety - Adult  Goal: Free from fall injury  7/19/2024 1941 by Jose Cherry RN  Outcome: Progressing  7/19/2024 0740 by Ale Cartagena RN  Outcome: Progressing     Problem: ABCDS Injury Assessment  Goal: Absence of physical injury  7/19/2024 1941 by Jose Cherry RN  Outcome: Progressing  7/19/2024 0740 by Ale Cartagena RN  Outcome: Progressing     Problem: Musculoskeletal - Adult  Goal: Return mobility to safest level of function  7/19/2024 1941 by Jose Cherry RN  Outcome: Progressing  7/19/2024 0740 by Ale Catragena RN  Outcome: Progressing  Goal: Return ADL status to a safe level of function  7/19/2024 1941 by Jose Cherry RN  Outcome: Progressing  7/19/2024 0740 by Ale Cartagena RN  Outcome: Progressing     Problem: Metabolic/Fluid and Electrolytes - Adult  Goal: Hemodynamic stability and optimal renal function maintained  7/19/2024 1941 by Jose Cherry RN  Outcome: Progressing  7/19/2024 0740 by 
  Problem: Discharge Planning  Goal: Discharge to home or other facility with appropriate resources  7/21/2024 0928 by Roshni Haji RN  Outcome: Progressing  7/20/2024 2315 by Jose Cherry RN  Outcome: Progressing     Problem: Pain  Goal: Verbalizes/displays adequate comfort level or baseline comfort level  7/21/2024 0928 by Roshni Haji RN  Outcome: Progressing  7/20/2024 2315 by Jose Cherry RN  Outcome: Progressing     Problem: Skin/Tissue Integrity  Goal: Absence of new skin breakdown  Description: 1.  Monitor for areas of redness and/or skin breakdown  2.  Assess vascular access sites hourly  3.  Every 4-6 hours minimum:  Change oxygen saturation probe site  4.  Every 4-6 hours:  If on nasal continuous positive airway pressure, respiratory therapy assess nares and determine need for appliance change or resting period.  7/21/2024 0928 by Roshni Haji RN  Outcome: Progressing  7/20/2024 2315 by Jose Cherry RN  Outcome: Progressing     Problem: Safety - Adult  Goal: Free from fall injury  7/21/2024 0928 by Roshni Haji RN  Outcome: Progressing  7/20/2024 2315 by Jose Cherry RN  Outcome: Progressing     Problem: ABCDS Injury Assessment  Goal: Absence of physical injury  7/21/2024 0928 by Roshni Haji RN  Outcome: Progressing  7/20/2024 2315 by Jose Cherry RN  Outcome: Progressing     Problem: Musculoskeletal - Adult  Goal: Return mobility to safest level of function  7/21/2024 0928 by Roshni Haji RN  Outcome: Progressing  7/20/2024 2315 by Jose Cherry RN  Outcome: Progressing  Goal: Return ADL status to a safe level of function  7/21/2024 0928 by Roshni Haji RN  Outcome: Progressing  7/20/2024 2315 by Jose Cherry RN  Outcome: Progressing     Problem: Metabolic/Fluid and Electrolytes - Adult  Goal: Hemodynamic stability and optimal renal function maintained  7/21/2024 0928 by Rohsni Haji RN  Outcome: Progressing  7/20/2024 2315 by Jose Cherry RN  Outcome: Progressing   
  Problem: Discharge Planning  Goal: Discharge to home or other facility with appropriate resources  7/21/2024 2156 by Hollie Fuentes, RN  Outcome: Progressing     Problem: Pain  Goal: Verbalizes/displays adequate comfort level or baseline comfort level  7/21/2024 2156 by Hollie Fuentes, RN  Outcome: Progressing     Problem: Skin/Tissue Integrity  Goal: Absence of new skin breakdown  Description: 1.  Monitor for areas of redness and/or skin breakdown  2.  Assess vascular access sites hourly  3.  Every 4-6 hours minimum:  Change oxygen saturation probe site  4.  Every 4-6 hours:  If on nasal continuous positive airway pressure, respiratory therapy assess nares and determine need for appliance change or resting period.  7/21/2024 2156 by Hollie Fuentes RN  Outcome: Progressing     Problem: Safety - Adult  Goal: Free from fall injury  7/21/2024 2156 by Hollie Fuentes, RN  Outcome: Progressing     Problem: ABCDS Injury Assessment  Goal: Absence of physical injury  7/21/2024 2156 by Hollie Fuentes, RN  Outcome: Progressing     
  Problem: Discharge Planning  Goal: Discharge to home or other facility with appropriate resources  7/22/2024 0721 by Ale Cartagena, RN  Outcome: Progressing  7/21/2024 2156 by Hollie Fuentes, RN  Outcome: Progressing     
  Problem: Discharge Planning  Goal: Discharge to home or other facility with appropriate resources  7/22/2024 2058 by Hollie Fuentes, RN  Outcome: Progressing     Problem: Pain  Goal: Verbalizes/displays adequate comfort level or baseline comfort level  7/22/2024 2058 by Hollie Fuentes, RN  Outcome: Progressing     Problem: Skin/Tissue Integrity  Goal: Absence of new skin breakdown  Description: 1.  Monitor for areas of redness and/or skin breakdown  2.  Assess vascular access sites hourly  3.  Every 4-6 hours minimum:  Change oxygen saturation probe site  4.  Every 4-6 hours:  If on nasal continuous positive airway pressure, respiratory therapy assess nares and determine need for appliance change or resting period.  7/22/2024 2058 by Hollie Fuentes RN  Outcome: Progressing     Problem: Safety - Adult  Goal: Free from fall injury  7/22/2024 2058 by Hollie Fuentes, RN  Outcome: Progressing     Problem: ABCDS Injury Assessment  Goal: Absence of physical injury  7/22/2024 2058 by Hollie Fuentes, RN  Outcome: Progressing     
  Problem: Discharge Planning  Goal: Discharge to home or other facility with appropriate resources  Outcome: Progressing     Problem: Pain  Goal: Verbalizes/displays adequate comfort level or baseline comfort level  Outcome: Progressing     Problem: Skin/Tissue Integrity  Goal: Absence of new skin breakdown  Description: 1.  Monitor for areas of redness and/or skin breakdown  2.  Assess vascular access sites hourly  3.  Every 4-6 hours minimum:  Change oxygen saturation probe site  4.  Every 4-6 hours:  If on nasal continuous positive airway pressure, respiratory therapy assess nares and determine need for appliance change or resting period.  Outcome: Progressing     Problem: Safety - Adult  Goal: Free from fall injury  Outcome: Progressing     Problem: ABCDS Injury Assessment  Goal: Absence of physical injury  Outcome: Progressing     
  Problem: Discharge Planning  Goal: Discharge to home or other facility with appropriate resources  Outcome: Progressing     Problem: Pain  Goal: Verbalizes/displays adequate comfort level or baseline comfort level  Outcome: Progressing     Problem: Skin/Tissue Integrity  Goal: Absence of new skin breakdown  Description: 1.  Monitor for areas of redness and/or skin breakdown  2.  Assess vascular access sites hourly  3.  Every 4-6 hours minimum:  Change oxygen saturation probe site  4.  Every 4-6 hours:  If on nasal continuous positive airway pressure, respiratory therapy assess nares and determine need for appliance change or resting period.  Outcome: Progressing     Problem: Safety - Adult  Goal: Free from fall injury  Outcome: Progressing     Problem: ABCDS Injury Assessment  Goal: Absence of physical injury  Outcome: Progressing     Problem: Musculoskeletal - Adult  Goal: Return mobility to safest level of function  Outcome: Progressing  Goal: Return ADL status to a safe level of function  Outcome: Progressing     Problem: Metabolic/Fluid and Electrolytes - Adult  Goal: Hemodynamic stability and optimal renal function maintained  Outcome: Progressing     Problem: Hematologic - Adult  Goal: Maintains hematologic stability  Outcome: Progressing     
  Problem: Discharge Planning  Goal: Discharge to home or other facility with appropriate resources  Outcome: Progressing     Problem: Pain  Goal: Verbalizes/displays adequate comfort level or baseline comfort level  Outcome: Progressing     Problem: Skin/Tissue Integrity  Goal: Absence of new skin breakdown  Description: 1.  Monitor for areas of redness and/or skin breakdown  2.  Assess vascular access sites hourly  3.  Every 4-6 hours minimum:  Change oxygen saturation probe site  4.  Every 4-6 hours:  If on nasal continuous positive airway pressure, respiratory therapy assess nares and determine need for appliance change or resting period.  Outcome: Progressing     Problem: Safety - Adult  Goal: Free from fall injury  Outcome: Progressing     Problem: ABCDS Injury Assessment  Goal: Absence of physical injury  Outcome: Progressing     Problem: Musculoskeletal - Adult  Goal: Return mobility to safest level of function  Outcome: Progressing  Goal: Return ADL status to a safe level of function  Outcome: Progressing     Problem: Metabolic/Fluid and Electrolytes - Adult  Goal: Hemodynamic stability and optimal renal function maintained  Outcome: Progressing     Problem: Hematologic - Adult  Goal: Maintains hematologic stability  Outcome: Progressing     
Progressing     Problem: Hematologic - Adult  Goal: Maintains hematologic stability  7/19/2024 0300 by Jose Cherry, RN  Outcome: Progressing  7/18/2024 1838 by Xavi Harrison RN  Outcome: Progressing

## 2024-07-24 NOTE — CARE COORDINATION
7/24/2024: SS NOTE:  Notified by [facility] for [facility] that they received PRE CERT & transfer confirmed or today 7/24, via PAS @ 10:30 am, SCOOBY eric,  [facility] to send the HENS/PASRR to new SNF, pt's daughter, Nannette notified of transfer arrangements and will inform her father and brother, Nursing notified. Electronically signed by STEPHEN Deutsch on 7/24/2024 at 9:08 AM

## 2024-07-25 LAB
ALBUMIN SERPL-MCNC: 2.8 G/DL (ref 3.5–5.2)
ALP SERPL-CCNC: 84 U/L (ref 40–129)
ALT SERPL-CCNC: 8 U/L (ref 0–40)
ANION GAP SERPL CALCULATED.3IONS-SCNC: 10 MMOL/L (ref 7–16)
AST SERPL-CCNC: 18 U/L (ref 0–39)
BASOPHILS # BLD: 0.03 K/UL (ref 0–0.2)
BASOPHILS NFR BLD: 0 % (ref 0–2)
BILIRUB SERPL-MCNC: 0.7 MG/DL (ref 0–1.2)
BUN SERPL-MCNC: 37 MG/DL (ref 6–23)
CALCIUM SERPL-MCNC: 8.4 MG/DL (ref 8.6–10.2)
CHLORIDE SERPL-SCNC: 106 MMOL/L (ref 98–107)
CO2 SERPL-SCNC: 24 MMOL/L (ref 22–29)
CREAT SERPL-MCNC: 0.8 MG/DL (ref 0.7–1.2)
EOSINOPHIL # BLD: 0.31 K/UL (ref 0.05–0.5)
EOSINOPHILS RELATIVE PERCENT: 3 % (ref 0–6)
ERYTHROCYTE [DISTWIDTH] IN BLOOD BY AUTOMATED COUNT: 16.3 % (ref 11.5–15)
GFR, ESTIMATED: 84 ML/MIN/1.73M2
GLUCOSE SERPL-MCNC: 125 MG/DL (ref 74–99)
HCT VFR BLD AUTO: 24.2 % (ref 37–54)
HGB BLD-MCNC: 7.7 G/DL (ref 12.5–16.5)
IMM GRANULOCYTES # BLD AUTO: 0.09 K/UL (ref 0–0.58)
IMM GRANULOCYTES NFR BLD: 1 % (ref 0–5)
LYMPHOCYTES NFR BLD: 1.37 K/UL (ref 1.5–4)
LYMPHOCYTES RELATIVE PERCENT: 13 % (ref 20–42)
MCH RBC QN AUTO: 32.9 PG (ref 26–35)
MCHC RBC AUTO-ENTMCNC: 31.8 G/DL (ref 32–34.5)
MCV RBC AUTO: 103.4 FL (ref 80–99.9)
MONOCYTES NFR BLD: 0.71 K/UL (ref 0.1–0.95)
MONOCYTES NFR BLD: 7 % (ref 2–12)
NEUTROPHILS NFR BLD: 76 % (ref 43–80)
NEUTS SEG NFR BLD: 7.92 K/UL (ref 1.8–7.3)
PLATELET # BLD AUTO: 238 K/UL (ref 130–450)
PMV BLD AUTO: 11 FL (ref 7–12)
POTASSIUM SERPL-SCNC: 4.9 MMOL/L (ref 3.5–5)
PROT SERPL-MCNC: 5.2 G/DL (ref 6.4–8.3)
RBC # BLD AUTO: 2.34 M/UL (ref 3.8–5.8)
SODIUM SERPL-SCNC: 140 MMOL/L (ref 132–146)
WBC OTHER # BLD: 10.4 K/UL (ref 4.5–11.5)

## 2024-07-30 LAB
BASOPHILS # BLD: 0.01 K/UL (ref 0–0.2)
BASOPHILS NFR BLD: 0 % (ref 0–2)
EOSINOPHIL # BLD: 0.05 K/UL (ref 0.05–0.5)
EOSINOPHILS RELATIVE PERCENT: 1 % (ref 0–6)
ERYTHROCYTE [DISTWIDTH] IN BLOOD BY AUTOMATED COUNT: 16 % (ref 11.5–15)
HCT VFR BLD AUTO: 26.1 % (ref 37–54)
HGB BLD-MCNC: 7.9 G/DL (ref 12.5–16.5)
IMM GRANULOCYTES # BLD AUTO: 0.07 K/UL (ref 0–0.58)
IMM GRANULOCYTES NFR BLD: 1 % (ref 0–5)
LYMPHOCYTES NFR BLD: 0.75 K/UL (ref 1.5–4)
LYMPHOCYTES RELATIVE PERCENT: 7 % (ref 20–42)
MCH RBC QN AUTO: 31.3 PG (ref 26–35)
MCHC RBC AUTO-ENTMCNC: 30.3 G/DL (ref 32–34.5)
MCV RBC AUTO: 103.6 FL (ref 80–99.9)
MONOCYTES NFR BLD: 0.67 K/UL (ref 0.1–0.95)
MONOCYTES NFR BLD: 7 % (ref 2–12)
NEUTROPHILS NFR BLD: 85 % (ref 43–80)
NEUTS SEG NFR BLD: 8.71 K/UL (ref 1.8–7.3)
PLATELET # BLD AUTO: 437 K/UL (ref 130–450)
PMV BLD AUTO: 10.5 FL (ref 7–12)
RBC # BLD AUTO: 2.52 M/UL (ref 3.8–5.8)
WBC OTHER # BLD: 10.3 K/UL (ref 4.5–11.5)

## 2024-08-06 LAB
ALBUMIN SERPL-MCNC: 2.6 G/DL (ref 3.5–5.2)
ALP SERPL-CCNC: 122 U/L (ref 40–129)
ALT SERPL-CCNC: 14 U/L (ref 0–40)
ANION GAP SERPL CALCULATED.3IONS-SCNC: 10 MMOL/L (ref 7–16)
AST SERPL-CCNC: 20 U/L (ref 0–39)
BASOPHILS # BLD: 0.02 K/UL (ref 0–0.2)
BASOPHILS NFR BLD: 0 % (ref 0–2)
BILIRUB SERPL-MCNC: 0.9 MG/DL (ref 0–1.2)
BUN SERPL-MCNC: 32 MG/DL (ref 6–23)
CALCIUM SERPL-MCNC: 9.5 MG/DL (ref 8.6–10.2)
CHLORIDE SERPL-SCNC: 103 MMOL/L (ref 98–107)
CO2 SERPL-SCNC: 28 MMOL/L (ref 22–29)
CREAT SERPL-MCNC: 0.8 MG/DL (ref 0.7–1.2)
EOSINOPHIL # BLD: 0 K/UL (ref 0.05–0.5)
EOSINOPHILS RELATIVE PERCENT: 0 % (ref 0–6)
ERYTHROCYTE [DISTWIDTH] IN BLOOD BY AUTOMATED COUNT: 15.8 % (ref 11.5–15)
GFR, ESTIMATED: 86 ML/MIN/1.73M2
GLUCOSE SERPL-MCNC: 148 MG/DL (ref 74–99)
HCT VFR BLD AUTO: 29.5 % (ref 37–54)
HGB BLD-MCNC: 9.1 G/DL (ref 12.5–16.5)
IMM GRANULOCYTES # BLD AUTO: 0.07 K/UL (ref 0–0.58)
IMM GRANULOCYTES NFR BLD: 1 % (ref 0–5)
LYMPHOCYTES NFR BLD: 1.25 K/UL (ref 1.5–4)
LYMPHOCYTES RELATIVE PERCENT: 9 % (ref 20–42)
MCH RBC QN AUTO: 31.5 PG (ref 26–35)
MCHC RBC AUTO-ENTMCNC: 30.8 G/DL (ref 32–34.5)
MCV RBC AUTO: 102.1 FL (ref 80–99.9)
MONOCYTES NFR BLD: 0.92 K/UL (ref 0.1–0.95)
MONOCYTES NFR BLD: 7 % (ref 2–12)
NEUTROPHILS NFR BLD: 83 % (ref 43–80)
NEUTS SEG NFR BLD: 11.25 K/UL (ref 1.8–7.3)
PLATELET # BLD AUTO: 430 K/UL (ref 130–450)
PMV BLD AUTO: 10.4 FL (ref 7–12)
POTASSIUM SERPL-SCNC: 3.8 MMOL/L (ref 3.5–5)
PROT SERPL-MCNC: 6.2 G/DL (ref 6.4–8.3)
RBC # BLD AUTO: 2.89 M/UL (ref 3.8–5.8)
SODIUM SERPL-SCNC: 141 MMOL/L (ref 132–146)
WBC OTHER # BLD: 13.5 K/UL (ref 4.5–11.5)

## 2024-08-08 LAB
BACTERIA URNS QL MICRO: ABNORMAL
BILIRUB UR QL STRIP: NEGATIVE
CASTS #/AREA URNS LPF: ABNORMAL /LPF
CLARITY UR: CLEAR
COLOR UR: YELLOW
CRYSTALS URNS MICRO: ABNORMAL /HPF
EPI CELLS #/AREA URNS HPF: ABNORMAL /HPF
GLUCOSE UR STRIP-MCNC: NEGATIVE MG/DL
HGB UR QL STRIP.AUTO: NEGATIVE
KETONES UR STRIP-MCNC: NEGATIVE MG/DL
LEUKOCYTE ESTERASE UR QL STRIP: NEGATIVE
MUCOUS THREADS URNS QL MICRO: PRESENT
NITRITE UR QL STRIP: NEGATIVE
PH UR STRIP: 6 [PH] (ref 5–9)
PROT UR STRIP-MCNC: ABNORMAL MG/DL
RBC #/AREA URNS HPF: ABNORMAL /HPF
SP GR UR STRIP: 1.02 (ref 1–1.03)
UROBILINOGEN UR STRIP-ACNC: 0.2 EU/DL (ref 0–1)
WBC #/AREA URNS HPF: ABNORMAL /HPF

## 2024-08-09 ENCOUNTER — OFFICE VISIT (OUTPATIENT)
Dept: ORTHOPEDIC SURGERY | Age: 89
End: 2024-08-09

## 2024-08-09 VITALS — TEMPERATURE: 98 F | BODY MASS INDEX: 21.37 KG/M2 | HEIGHT: 68 IN | WEIGHT: 141 LBS

## 2024-08-09 DIAGNOSIS — S72.141A CLOSED COMMINUTED INTERTROCHANTERIC FRACTURE OF PROXIMAL END OF RIGHT FEMUR, INITIAL ENCOUNTER (HCC): Primary | ICD-10-CM

## 2024-08-09 PROCEDURE — 99024 POSTOP FOLLOW-UP VISIT: CPT | Performed by: ORTHOPAEDIC SURGERY

## 2024-08-09 NOTE — PROGRESS NOTES
Chief Complaint:   Chief Complaint   Patient presents with    Hip Pain     Left hip ORIF DOS 7/19/24 patient had a fall 1 week at the facility post surgery.       Dariusz Schreiber follows up 3 weeks postop ORIF right proximal femur for intertrochanteric fracture.  He is in a wheelchair.  He has a Sergey sling beneath him.  He has been at his SNF.  Family members are present with him.  He has not had his staples removed from his wounds.  Physical therapy is apparently difficult with him complaining of pain and he has a lot of stiffness which we noted before.    He apparently had another fall at the SNF and x-rays of the femur were done.      Allergies; medications; past medical, surgical, family, and social history; and problem list have been reviewed today and updated as indicated in this encounter seen below.    Exam: There are no wound complications noted.  Right hip gentle range of motion is without painful reaction.  There is stiffness in both knees and is difficult to get him anywhere near full extension and he does complain about that.    Radiographs: Limited x-ray study showing Anna lateral of the distal femur and an oblique work of the right proximal femur showed no evidence of fracture or disruption of fixation though this was an inadequate study.    ASSESSMENT:    Dariusz was seen today for hip pain.    Diagnoses and all orders for this visit:    Closed comminuted intertrochanteric fracture of proximal end of right femur, initial encounter ([facility])        PLAN: Continued attempts at physical therapy to mobilize his joints.  He needs to have his staples removed at the SNF upon return today.  If he is still in the skilled care he will have x-rays in 3 weeks and these are to be presented for my review.    No follow-ups on file.       Current Outpatient Medications   Medication Sig Dispense Refill    aspirin (TRACY ASPIRIN) 325 MG tablet Take 1 tablet by mouth in the morning and at bedtime 60 tablet 0    polyethylene

## 2024-08-11 LAB
MICROORGANISM SPEC CULT: ABNORMAL
SPECIMEN DESCRIPTION: ABNORMAL

## 2024-08-14 LAB — L PNEUMO1 AG UR QL IA.RAPID: NEGATIVE

## 2024-09-01 LAB
ALBUMIN SERPL-MCNC: 2.1 G/DL (ref 3.5–5.2)
ALP SERPL-CCNC: 121 U/L (ref 40–129)
ALT SERPL-CCNC: 12 U/L (ref 0–40)
ANION GAP SERPL CALCULATED.3IONS-SCNC: 9 MMOL/L (ref 7–16)
AST SERPL-CCNC: 19 U/L (ref 0–39)
BILIRUB SERPL-MCNC: 0.3 MG/DL (ref 0–1.2)
BUN SERPL-MCNC: 32 MG/DL (ref 6–23)
CALCIUM SERPL-MCNC: 9.3 MG/DL (ref 8.6–10.2)
CHLORIDE SERPL-SCNC: 112 MMOL/L (ref 98–107)
CO2 SERPL-SCNC: 28 MMOL/L (ref 22–29)
CREAT SERPL-MCNC: 0.8 MG/DL (ref 0.7–1.2)
ERYTHROCYTE [DISTWIDTH] IN BLOOD BY AUTOMATED COUNT: 16.1 % (ref 11.5–15)
GFR, ESTIMATED: 86 ML/MIN/1.73M2
GLUCOSE SERPL-MCNC: 83 MG/DL (ref 74–99)
HCT VFR BLD AUTO: 28.5 % (ref 37–54)
HGB BLD-MCNC: 8.5 G/DL (ref 12.5–16.5)
MCH RBC QN AUTO: 29.5 PG (ref 26–35)
MCHC RBC AUTO-ENTMCNC: 29.8 G/DL (ref 32–34.5)
MCV RBC AUTO: 99 FL (ref 80–99.9)
PLATELET # BLD AUTO: 121 K/UL (ref 130–450)
PMV BLD AUTO: 12.2 FL (ref 7–12)
POTASSIUM SERPL-SCNC: 4 MMOL/L (ref 3.5–5)
PROT SERPL-MCNC: 5.6 G/DL (ref 6.4–8.3)
RBC # BLD AUTO: 2.88 M/UL (ref 3.8–5.8)
SODIUM SERPL-SCNC: 149 MMOL/L (ref 132–146)
WBC OTHER # BLD: 7.7 K/UL (ref 4.5–11.5)

## (undated) DEVICE — BANDAGE NL COFLEX 4INX5YD: Brand: MEDLINE INDUSTRIES, INC.

## (undated) DEVICE — GAUZE,SPONGE,4"X4",16PLY,STRL,LF,10/TRAY: Brand: MEDLINE

## (undated) DEVICE — COLUMN DRAPE

## (undated) DEVICE — SUTURE ABSRB L6IN L37MM 0 GS-21 GRN 1/2 CIR TAPR PNT NDL VLOCL0306

## (undated) DEVICE — COVER,TABLE,44X90,STERILE: Brand: MEDLINE

## (undated) DEVICE — DOUBLE BASIN SET: Brand: MEDLINE INDUSTRIES, INC.

## (undated) DEVICE — PAD,ABDOMINAL,5"X9",ST,LF,25/BX: Brand: MEDLINE INDUSTRIES, INC.

## (undated) DEVICE — APPLICATOR MEDICATED 26 CC SOLUTION HI LT ORNG CHLORAPREP

## (undated) DEVICE — TOWEL,OR,DSP,ST,BLUE,STD,6/PK,12PK/CS: Brand: MEDLINE

## (undated) DEVICE — SCOPE DAVINCI XI 0 DEG W/CORD

## (undated) DEVICE — ELECTRODE PT RET AD L9FT HI MOIST COND ADH HYDRGEL CORDED

## (undated) DEVICE — GARMENT,MEDLINE,DVT,INT,CALF,MED, GEN2: Brand: MEDLINE

## (undated) DEVICE — BANDAGE COMPR W4INXL5YD WHT BGE POLY COT M E WRP WV HK AND

## (undated) DEVICE — APPLICATOR SURG XL L38CM FOR ARISTA ABSRB HEMSTAT FLEXITIP

## (undated) DEVICE — Device: Brand: INSTRUSAFE

## (undated) DEVICE — ARM DRAPE

## (undated) DEVICE — GUIDEWIRE ORTH L400MM DIA3.2MM FOR TFN

## (undated) DEVICE — BIT DRL L L266MM DIA16MM FEM FLX CANN QUIK CPL

## (undated) DEVICE — COVER,LIGHT HANDLE,FLX,1/PK: Brand: MEDLINE INDUSTRIES, INC.

## (undated) DEVICE — GLOVE ORANGE PI 8   MSG9080

## (undated) DEVICE — PLUMEPORT LAPAROSCOPIC SMOKE FILTRATION DEVICE: Brand: PLUMEPORT ACTIV

## (undated) DEVICE — CHLORAPREP 26ML ORANGE

## (undated) DEVICE — MEGA SUTURECUT ND: Brand: ENDOWRIST

## (undated) DEVICE — INSUFFLATION NEEDLE TO ESTABLISH PNEUMOPERITONEUM.: Brand: INSUFFLATION NEEDLE

## (undated) DEVICE — SCOPE DAVINCI XI 30 DEG W/CORD

## (undated) DEVICE — NEEDLE HYPO 25GA L1.5IN BLU POLYPR HUB S STL REG BVL STR

## (undated) DEVICE — PACK SURG LAP CHOLE CUSTOM

## (undated) DEVICE — NDL CNTR 40CT FM MAG: Brand: MEDLINE INDUSTRIES, INC.

## (undated) DEVICE — TUBING, SUCTION, 1/4" X 10', STRAIGHT: Brand: MEDLINE

## (undated) DEVICE — 6617 IOBAN II PATIENT ISOLATION DRAPE 5/BX,4BX/CS: Brand: STERI-DRAPE™ IOBAN™ 2

## (undated) DEVICE — SCISSORS SURG DIA8MM MPLR CRV ENDOWRIST

## (undated) DEVICE — PROGRASP FORCEPS: Brand: ENDOWRIST

## (undated) DEVICE — CANNULA SEAL

## (undated) DEVICE — [HIGH FLOW INSUFFLATOR,  DO NOT USE IF PACKAGE IS DAMAGED,  KEEP DRY,  KEEP AWAY FROM SUNLIGHT,  PROTECT FROM HEAT AND RADIOACTIVE SOURCES.]: Brand: PNEUMOSURE

## (undated) DEVICE — 4-PORT MANIFOLD: Brand: NEPTUNE 2

## (undated) DEVICE — WARMER SCP LAP

## (undated) DEVICE — WIPES SKIN CLOTH READYPREP 9 X 10.5 IN 2% CHLORHEX GLUCONATE CHG PREOP

## (undated) DEVICE — TIP COVER ACCESSORY

## (undated) DEVICE — ELECTRO LUBE IS A SINGLE PATIENT USE DEVICE THAT IS INTENDED TO BE USED ON ELECTROSURGICAL ELECTRODES TO REDUCE STICKING.: Brand: KEY SURGICAL ELECTRO LUBE

## (undated) DEVICE — SHEET DRAPE FULL 70X100

## (undated) DEVICE — GLOVE ORANGE PI 7 1/2   MSG9075

## (undated) DEVICE — SOLUTION IRRIG 1000ML 0.9% SOD CHL USP POUR PLAS BTL

## (undated) DEVICE — SPONGE LAP W18XL18IN WHT COT 4 PLY FLD STRUNG RADPQ DISP ST 2 PER PACK

## (undated) DEVICE — MEDI-VAC YANKAUER SUCTION HANDLE: Brand: CARDINAL HEALTH

## (undated) DEVICE — BLADE,STAINLESS-STEEL,10,STRL,DISPOSABLE: Brand: MEDLINE

## (undated) DEVICE — BASIC DOUBLE BASIN 2-LF: Brand: MEDLINE INDUSTRIES, INC.

## (undated) DEVICE — Device

## (undated) DEVICE — GOWN,SIRUS,POLYRNF,RAGLAN,XL,ST,30/CS: Brand: MEDLINE

## (undated) DEVICE — BLADELESS OBTURATOR: Brand: WECK VISTA

## (undated) DEVICE — SCISSORS ENDOSCP DIA5MM CRV MPLR CAUT W/ RATCH HNDL

## (undated) DEVICE — BIT DRL L500MM DIA6X9MM CANN STP L QUIK CPL FOR DH DC TFN

## (undated) DEVICE — PACK,BASIC I: Brand: MEDLINE

## (undated) DEVICE — BLADE ES ELASTOMERIC COAT INSUL DURABLE BEND UPTO 90DEG

## (undated) DEVICE — PAD,ABDOMINAL,8"X10",ST,LF: Brand: MEDLINE

## (undated) DEVICE — SYRINGE MED 10ML TRNSLUC BRL PLUNG BLK MRK POLYPR CTRL

## (undated) DEVICE — SET ENDO INSTR LAPAROSCOPIC INCISIONAL

## (undated) DEVICE — SET INST DAVINCI XI ACCESSORIES

## (undated) DEVICE — VASELINE PETROLATUM GAUZE STRIP: Brand: VASELINE

## (undated) DEVICE — PADDING,UNDERCAST,COTTON, 4"X4YD STERILE: Brand: MEDLINE

## (undated) DEVICE — AGENT HEMSTAT 3GM PURIFIED PLNT STARCH PWD ABSRB ARISTA AH

## (undated) DEVICE — BIT DRL L145MM DIA4.2MM ST 3 FLUT NDL PNT QUIK CPL FOR FEM

## (undated) DEVICE — MARKER,SKIN,WI/RULER AND LABELS: Brand: MEDLINE

## (undated) DEVICE — GOWN,SIRUS,FABRNF,XL,20/CS: Brand: MEDLINE

## (undated) DEVICE — SYRINGE IRRIG 60ML SFT PLIABLE BLB EZ TO GRP 1 HND USE W/

## (undated) DEVICE — GOWN,SIRUS,NONRNF,SETINSLV,XL,20/CS: Brand: MEDLINE

## (undated) DEVICE — GLOVE ORTHO 8   MSG9480

## (undated) DEVICE — ROD RMR L950MM DIA2.5MM W/ EXTN BALL TIP